# Patient Record
Sex: MALE | Race: BLACK OR AFRICAN AMERICAN | NOT HISPANIC OR LATINO | Employment: UNEMPLOYED | URBAN - METROPOLITAN AREA
[De-identification: names, ages, dates, MRNs, and addresses within clinical notes are randomized per-mention and may not be internally consistent; named-entity substitution may affect disease eponyms.]

---

## 2020-05-20 ENCOUNTER — HOSPITAL ENCOUNTER (EMERGENCY)
Facility: HOSPITAL | Age: 40
Discharge: HOME | End: 2020-05-20
Attending: EMERGENCY MEDICINE | Admitting: EMERGENCY MEDICINE
Payer: COMMERCIAL

## 2020-05-20 VITALS
RESPIRATION RATE: 20 BRPM | HEIGHT: 72 IN | SYSTOLIC BLOOD PRESSURE: 140 MMHG | OXYGEN SATURATION: 98 % | WEIGHT: 239.86 LBS | BODY MASS INDEX: 32.49 KG/M2 | HEART RATE: 85 BPM | TEMPERATURE: 98 F | DIASTOLIC BLOOD PRESSURE: 88 MMHG

## 2020-05-20 DIAGNOSIS — Z76.0 MEDICATION REFILL: Primary | ICD-10-CM

## 2020-05-20 PROCEDURE — 99281 EMR DPT VST MAYX REQ PHY/QHP: CPT | Performed by: PHYSICIAN ASSISTANT

## 2020-05-20 PROCEDURE — 99283 EMERGENCY DEPT VISIT LOW MDM: CPT

## 2020-05-20 RX ORDER — INSULIN ASPART 100 [IU]/ML
INJECTION, SOLUTION INTRAVENOUS; SUBCUTANEOUS
Qty: 1 PEN | Refills: 0 | Status: SHIPPED | OUTPATIENT
Start: 2020-05-20 | End: 2020-05-27 | Stop reason: SDUPTHER

## 2020-05-20 RX ORDER — INSULIN ASPART 100 [IU]/ML
INJECTION, SOLUTION INTRAVENOUS; SUBCUTANEOUS
COMMUNITY
Start: 2020-01-14 | End: 2020-05-20 | Stop reason: SDUPTHER

## 2020-05-26 ENCOUNTER — TELEPHONE (OUTPATIENT)
Dept: INTERNAL MEDICINE CLINIC | Facility: CLINIC | Age: 40
End: 2020-05-26

## 2020-05-26 RX ORDER — BLOOD SUGAR DIAGNOSTIC
STRIP MISCELLANEOUS
COMMUNITY
Start: 2016-03-14 | End: 2020-05-27 | Stop reason: SDUPTHER

## 2020-05-27 ENCOUNTER — OFFICE VISIT (OUTPATIENT)
Dept: INTERNAL MEDICINE CLINIC | Facility: CLINIC | Age: 40
End: 2020-05-27
Payer: COMMERCIAL

## 2020-05-27 ENCOUNTER — TELEPHONE (OUTPATIENT)
Dept: INTERNAL MEDICINE CLINIC | Facility: CLINIC | Age: 40
End: 2020-05-27

## 2020-05-27 VITALS
WEIGHT: 238.6 LBS | SYSTOLIC BLOOD PRESSURE: 122 MMHG | BODY MASS INDEX: 33.4 KG/M2 | HEIGHT: 71 IN | TEMPERATURE: 98.6 F | HEART RATE: 86 BPM | RESPIRATION RATE: 14 BRPM | DIASTOLIC BLOOD PRESSURE: 76 MMHG

## 2020-05-27 DIAGNOSIS — E78.5 HYPERLIPIDEMIA DUE TO TYPE 1 DIABETES MELLITUS (HCC): ICD-10-CM

## 2020-05-27 DIAGNOSIS — R80.9 ALBUMINURIA: ICD-10-CM

## 2020-05-27 DIAGNOSIS — E10.9 TYPE 1 DIABETES MELLITUS WITHOUT COMPLICATION (HCC): ICD-10-CM

## 2020-05-27 DIAGNOSIS — E10.9 TYPE 1 DIABETES MELLITUS WITHOUT COMPLICATION (HCC): Primary | ICD-10-CM

## 2020-05-27 DIAGNOSIS — Z00.00 ANNUAL PHYSICAL EXAM: Primary | ICD-10-CM

## 2020-05-27 DIAGNOSIS — E10.69 HYPERLIPIDEMIA DUE TO TYPE 1 DIABETES MELLITUS (HCC): ICD-10-CM

## 2020-05-27 DIAGNOSIS — Z76.0 MEDICATION REFILL: ICD-10-CM

## 2020-05-27 DIAGNOSIS — Z11.4 SCREENING FOR HIV (HUMAN IMMUNODEFICIENCY VIRUS): ICD-10-CM

## 2020-05-27 PROBLEM — E66.9 OBESITY (BMI 30-39.9): Status: ACTIVE | Noted: 2019-03-12

## 2020-05-27 LAB
LEFT EYE DIABETIC RETINOPATHY: NORMAL
LEFT EYE DIABETIC RETINOPATHY: NORMAL
LEFT EYE IMAGE QUALITY: NORMAL
LEFT EYE IMAGE QUALITY: NORMAL
LEFT EYE MACULAR EDEMA: NORMAL
LEFT EYE MACULAR EDEMA: NORMAL
LEFT EYE OTHER RETINOPATHY: NORMAL
LEFT EYE OTHER RETINOPATHY: NORMAL
RIGHT EYE DIABETIC RETINOPATHY: NORMAL
RIGHT EYE DIABETIC RETINOPATHY: NORMAL
RIGHT EYE IMAGE QUALITY: NORMAL
RIGHT EYE IMAGE QUALITY: NORMAL
RIGHT EYE MACULAR EDEMA: NORMAL
RIGHT EYE MACULAR EDEMA: NORMAL
RIGHT EYE OTHER RETINOPATHY: NORMAL
RIGHT EYE OTHER RETINOPATHY: NORMAL
SEVERITY (EYE EXAM): NORMAL
SEVERITY (EYE EXAM): NORMAL

## 2020-05-27 PROCEDURE — 3008F BODY MASS INDEX DOCD: CPT | Performed by: NURSE PRACTITIONER

## 2020-05-27 PROCEDURE — 3066F NEPHROPATHY DOC TX: CPT | Performed by: NURSE PRACTITIONER

## 2020-05-27 PROCEDURE — 99203 OFFICE O/P NEW LOW 30 MIN: CPT | Performed by: NURSE PRACTITIONER

## 2020-05-27 PROCEDURE — 92250 FUNDUS PHOTOGRAPHY W/I&R: CPT | Performed by: NURSE PRACTITIONER

## 2020-05-27 PROCEDURE — 3066F NEPHROPATHY DOC TX: CPT | Performed by: INTERNAL MEDICINE

## 2020-05-27 PROCEDURE — 2022F DILAT RTA XM EVC RTNOPTHY: CPT | Performed by: NURSE PRACTITIONER

## 2020-05-27 PROCEDURE — 1036F TOBACCO NON-USER: CPT | Performed by: NURSE PRACTITIONER

## 2020-05-27 PROCEDURE — 2025F 7 FLD RTA PHOTO W/O RTNOPTHY: CPT | Performed by: INTERNAL MEDICINE

## 2020-05-27 RX ORDER — INSULIN ASPART 100 [IU]/ML
INJECTION, SOLUTION INTRAVENOUS; SUBCUTANEOUS
Qty: 1 PEN | Refills: 0 | Status: SHIPPED | OUTPATIENT
Start: 2020-05-27 | End: 2020-07-22 | Stop reason: SDUPTHER

## 2020-05-27 RX ORDER — BLOOD SUGAR DIAGNOSTIC
STRIP MISCELLANEOUS 4 TIMES DAILY
Qty: 120 EACH | Refills: 9 | Status: SHIPPED | OUTPATIENT
Start: 2020-05-27 | End: 2020-11-30

## 2020-05-27 RX ORDER — PEN NEEDLE, DIABETIC 31 GX5/16"
NEEDLE, DISPOSABLE MISCELLANEOUS DAILY
Qty: 90 EACH | Refills: 5 | Status: SHIPPED | OUTPATIENT
Start: 2020-05-27 | End: 2020-07-20 | Stop reason: SDUPTHER

## 2020-05-29 ENCOUNTER — TELEPHONE (OUTPATIENT)
Dept: INTERNAL MEDICINE CLINIC | Facility: CLINIC | Age: 40
End: 2020-05-29

## 2020-06-05 ENCOUNTER — TELEPHONE (OUTPATIENT)
Dept: INTERNAL MEDICINE CLINIC | Facility: CLINIC | Age: 40
End: 2020-06-05

## 2020-06-05 DIAGNOSIS — E10.9 TYPE 1 DIABETES MELLITUS WITHOUT COMPLICATION (HCC): Primary | ICD-10-CM

## 2020-06-16 ENCOUNTER — APPOINTMENT (OUTPATIENT)
Dept: LAB | Facility: CLINIC | Age: 40
End: 2020-06-16
Payer: COMMERCIAL

## 2020-06-16 DIAGNOSIS — E10.69 HYPERLIPIDEMIA DUE TO TYPE 1 DIABETES MELLITUS (HCC): ICD-10-CM

## 2020-06-16 DIAGNOSIS — E10.9 TYPE 1 DIABETES MELLITUS WITHOUT COMPLICATION (HCC): ICD-10-CM

## 2020-06-16 DIAGNOSIS — Z11.4 SCREENING FOR HIV (HUMAN IMMUNODEFICIENCY VIRUS): ICD-10-CM

## 2020-06-16 DIAGNOSIS — E78.5 HYPERLIPIDEMIA DUE TO TYPE 1 DIABETES MELLITUS (HCC): ICD-10-CM

## 2020-06-16 LAB
ABO GROUP BLD: NORMAL
ALBUMIN SERPL BCP-MCNC: 3.7 G/DL (ref 3.5–5)
ALP SERPL-CCNC: 71 U/L (ref 46–116)
ALT SERPL W P-5'-P-CCNC: 32 U/L (ref 12–78)
ANION GAP SERPL CALCULATED.3IONS-SCNC: 5 MMOL/L (ref 4–13)
AST SERPL W P-5'-P-CCNC: 17 U/L (ref 5–45)
BASOPHILS # BLD AUTO: 0.02 THOUSANDS/ΜL (ref 0–0.1)
BASOPHILS NFR BLD AUTO: 0 % (ref 0–1)
BILIRUB SERPL-MCNC: 0.66 MG/DL (ref 0.2–1)
BUN SERPL-MCNC: 11 MG/DL (ref 5–25)
CALCIUM SERPL-MCNC: 9.5 MG/DL (ref 8.3–10.1)
CHLORIDE SERPL-SCNC: 107 MMOL/L (ref 100–108)
CHOLEST SERPL-MCNC: 270 MG/DL (ref 50–200)
CO2 SERPL-SCNC: 29 MMOL/L (ref 21–32)
CREAT SERPL-MCNC: 1 MG/DL (ref 0.6–1.3)
CREAT UR-MCNC: 384 MG/DL
EOSINOPHIL # BLD AUTO: 0.16 THOUSAND/ΜL (ref 0–0.61)
EOSINOPHIL NFR BLD AUTO: 3 % (ref 0–6)
ERYTHROCYTE [DISTWIDTH] IN BLOOD BY AUTOMATED COUNT: 14.2 % (ref 11.6–15.1)
EST. AVERAGE GLUCOSE BLD GHB EST-MCNC: 252 MG/DL
GFR SERPL CREATININE-BSD FRML MDRD: 107 ML/MIN/1.73SQ M
GLUCOSE P FAST SERPL-MCNC: 96 MG/DL (ref 65–99)
HBA1C MFR BLD: 10.4 %
HCT VFR BLD AUTO: 48.2 % (ref 36.5–49.3)
HDLC SERPL-MCNC: 50 MG/DL
HGB BLD-MCNC: 16 G/DL (ref 12–17)
IMM GRANULOCYTES # BLD AUTO: 0.01 THOUSAND/UL (ref 0–0.2)
IMM GRANULOCYTES NFR BLD AUTO: 0 % (ref 0–2)
LDLC SERPL CALC-MCNC: 193 MG/DL (ref 0–100)
LYMPHOCYTES # BLD AUTO: 1.85 THOUSANDS/ΜL (ref 0.6–4.47)
LYMPHOCYTES NFR BLD AUTO: 37 % (ref 14–44)
MCH RBC QN AUTO: 28.4 PG (ref 26.8–34.3)
MCHC RBC AUTO-ENTMCNC: 33.2 G/DL (ref 31.4–37.4)
MCV RBC AUTO: 86 FL (ref 82–98)
MICROALBUMIN UR-MCNC: 36.8 MG/L (ref 0–20)
MICROALBUMIN/CREAT 24H UR: 10 MG/G CREATININE (ref 0–30)
MONOCYTES # BLD AUTO: 0.39 THOUSAND/ΜL (ref 0.17–1.22)
MONOCYTES NFR BLD AUTO: 8 % (ref 4–12)
NEUTROPHILS # BLD AUTO: 2.55 THOUSANDS/ΜL (ref 1.85–7.62)
NEUTS SEG NFR BLD AUTO: 52 % (ref 43–75)
NONHDLC SERPL-MCNC: 220 MG/DL
NRBC BLD AUTO-RTO: 0 /100 WBCS
PLATELET # BLD AUTO: 165 THOUSANDS/UL (ref 149–390)
PMV BLD AUTO: 12.2 FL (ref 8.9–12.7)
POTASSIUM SERPL-SCNC: 4.1 MMOL/L (ref 3.5–5.3)
PROT SERPL-MCNC: 7.8 G/DL (ref 6.4–8.2)
RBC # BLD AUTO: 5.63 MILLION/UL (ref 3.88–5.62)
RH BLD: POSITIVE
SODIUM SERPL-SCNC: 141 MMOL/L (ref 136–145)
TRIGL SERPL-MCNC: 137 MG/DL
TSH SERPL DL<=0.05 MIU/L-ACNC: 1.31 UIU/ML (ref 0.36–3.74)
WBC # BLD AUTO: 4.98 THOUSAND/UL (ref 4.31–10.16)

## 2020-06-16 PROCEDURE — 85025 COMPLETE CBC W/AUTO DIFF WBC: CPT

## 2020-06-16 PROCEDURE — 80053 COMPREHEN METABOLIC PANEL: CPT

## 2020-06-16 PROCEDURE — 82043 UR ALBUMIN QUANTITATIVE: CPT | Performed by: NURSE PRACTITIONER

## 2020-06-16 PROCEDURE — 87389 HIV-1 AG W/HIV-1&-2 AB AG IA: CPT

## 2020-06-16 PROCEDURE — 80061 LIPID PANEL: CPT

## 2020-06-16 PROCEDURE — 3061F NEG MICROALBUMINURIA REV: CPT | Performed by: INTERNAL MEDICINE

## 2020-06-16 PROCEDURE — 83036 HEMOGLOBIN GLYCOSYLATED A1C: CPT

## 2020-06-16 PROCEDURE — 86900 BLOOD TYPING SEROLOGIC ABO: CPT

## 2020-06-16 PROCEDURE — 86901 BLOOD TYPING SEROLOGIC RH(D): CPT

## 2020-06-16 PROCEDURE — 3046F HEMOGLOBIN A1C LEVEL >9.0%: CPT | Performed by: INTERNAL MEDICINE

## 2020-06-16 PROCEDURE — 84443 ASSAY THYROID STIM HORMONE: CPT

## 2020-06-16 PROCEDURE — 82570 ASSAY OF URINE CREATININE: CPT | Performed by: NURSE PRACTITIONER

## 2020-06-16 PROCEDURE — 36415 COLL VENOUS BLD VENIPUNCTURE: CPT

## 2020-06-17 LAB — HIV 1+2 AB+HIV1 P24 AG SERPL QL IA: NORMAL

## 2020-06-22 ENCOUNTER — TELEPHONE (OUTPATIENT)
Dept: INTERNAL MEDICINE CLINIC | Facility: CLINIC | Age: 40
End: 2020-06-22

## 2020-06-22 DIAGNOSIS — E78.00 HYPERCHOLESTEREMIA: Primary | ICD-10-CM

## 2020-06-22 RX ORDER — ATORVASTATIN CALCIUM 20 MG/1
20 TABLET, FILM COATED ORAL DAILY
Qty: 90 TABLET | Refills: 0 | Status: SHIPPED | OUTPATIENT
Start: 2020-06-22 | End: 2020-09-25

## 2020-06-26 ENCOUNTER — TELEPHONE (OUTPATIENT)
Dept: INTERNAL MEDICINE CLINIC | Facility: CLINIC | Age: 40
End: 2020-06-26

## 2020-06-30 ENCOUNTER — TELEPHONE (OUTPATIENT)
Dept: INTERNAL MEDICINE CLINIC | Facility: CLINIC | Age: 40
End: 2020-06-30

## 2020-07-20 DIAGNOSIS — E10.9 TYPE 1 DIABETES MELLITUS WITHOUT COMPLICATION (HCC): ICD-10-CM

## 2020-07-21 RX ORDER — PEN NEEDLE, DIABETIC 31 GX5/16"
NEEDLE, DISPOSABLE MISCELLANEOUS DAILY
Qty: 150 EACH | Refills: 1 | Status: SHIPPED | OUTPATIENT
Start: 2020-07-21 | End: 2020-07-22 | Stop reason: SDUPTHER

## 2020-07-22 DIAGNOSIS — Z76.0 MEDICATION REFILL: ICD-10-CM

## 2020-07-22 DIAGNOSIS — E10.9 TYPE 1 DIABETES MELLITUS WITHOUT COMPLICATION (HCC): ICD-10-CM

## 2020-07-22 RX ORDER — PEN NEEDLE, DIABETIC 31 GX5/16"
NEEDLE, DISPOSABLE MISCELLANEOUS DAILY
Qty: 150 EACH | Refills: 1 | Status: SHIPPED | OUTPATIENT
Start: 2020-07-22 | End: 2020-11-30 | Stop reason: SDUPTHER

## 2020-07-22 RX ORDER — INSULIN ASPART 100 [IU]/ML
INJECTION, SOLUTION INTRAVENOUS; SUBCUTANEOUS
Qty: 3 PEN | Refills: 0 | Status: SHIPPED | OUTPATIENT
Start: 2020-07-22 | End: 2020-07-30

## 2020-07-22 NOTE — TELEPHONE ENCOUNTER
REFILL : Insulin Aspart FlexPen (NovoLOG FlexPen) 100 UNIT/ML SOPN      ALSO, Insulin Pen Needle (PEN NEEDLES 31GX5/16") 31G X 8 MM MISC THAT WERE SENT IN NEEDS TO BE FOR 5X A DAY, PATIENT STATES IT WAS SENT TO THE PHARMACY FOR 1X A Armida Greenberg    CALL BACK # [phone]    [facility] # [phone]

## 2020-07-30 DIAGNOSIS — Z76.0 MEDICATION REFILL: ICD-10-CM

## 2020-07-30 DIAGNOSIS — E10.9 TYPE 1 DIABETES MELLITUS WITHOUT COMPLICATION (HCC): ICD-10-CM

## 2020-07-30 RX ORDER — INSULIN ASPART 100 [IU]/ML
INJECTION, SOLUTION INTRAVENOUS; SUBCUTANEOUS
Qty: 9 PEN | Refills: 0 | Status: SHIPPED | OUTPATIENT
Start: 2020-07-30 | End: 2020-08-13

## 2020-08-13 ENCOUNTER — CONSULT (OUTPATIENT)
Dept: ENDOCRINOLOGY | Facility: CLINIC | Age: 40
End: 2020-08-13
Payer: COMMERCIAL

## 2020-08-13 VITALS
HEIGHT: 71 IN | DIASTOLIC BLOOD PRESSURE: 82 MMHG | BODY MASS INDEX: 35.08 KG/M2 | SYSTOLIC BLOOD PRESSURE: 120 MMHG | HEART RATE: 88 BPM | WEIGHT: 250.6 LBS | TEMPERATURE: 98 F

## 2020-08-13 DIAGNOSIS — E10.9 TYPE 1 DIABETES MELLITUS WITHOUT COMPLICATION (HCC): Primary | ICD-10-CM

## 2020-08-13 DIAGNOSIS — E78.2 MIXED HYPERLIPIDEMIA: ICD-10-CM

## 2020-08-13 DIAGNOSIS — E10.9 TYPE 1 DIABETES MELLITUS WITHOUT COMPLICATION (HCC): ICD-10-CM

## 2020-08-13 DIAGNOSIS — R80.9 MICROALBUMINURIA: ICD-10-CM

## 2020-08-13 DIAGNOSIS — E55.9 VITAMIN D DEFICIENCY: ICD-10-CM

## 2020-08-13 DIAGNOSIS — Z76.0 MEDICATION REFILL: ICD-10-CM

## 2020-08-13 DIAGNOSIS — E66.9 OBESITY (BMI 30-39.9): ICD-10-CM

## 2020-08-13 PROCEDURE — 1036F TOBACCO NON-USER: CPT | Performed by: INTERNAL MEDICINE

## 2020-08-13 PROCEDURE — 3060F POS MICROALBUMINURIA REV: CPT | Performed by: INTERNAL MEDICINE

## 2020-08-13 PROCEDURE — 99205 OFFICE O/P NEW HI 60 MIN: CPT | Performed by: INTERNAL MEDICINE

## 2020-08-13 PROCEDURE — 3008F BODY MASS INDEX DOCD: CPT | Performed by: INTERNAL MEDICINE

## 2020-08-13 PROCEDURE — 3046F HEMOGLOBIN A1C LEVEL >9.0%: CPT | Performed by: INTERNAL MEDICINE

## 2020-08-13 PROCEDURE — 3066F NEPHROPATHY DOC TX: CPT | Performed by: INTERNAL MEDICINE

## 2020-08-13 PROCEDURE — 2022F DILAT RTA XM EVC RTNOPTHY: CPT | Performed by: INTERNAL MEDICINE

## 2020-08-13 RX ORDER — BLOOD-GLUCOSE SENSOR
1 EACH MISCELLANEOUS
Qty: 12 EACH | Refills: 0 | Status: SHIPPED | OUTPATIENT
Start: 2020-08-13 | End: 2020-11-30 | Stop reason: SDUPTHER

## 2020-08-13 RX ORDER — BLOOD-GLUCOSE,RECEIVER,CONT
1 EACH MISCELLANEOUS CONTINUOUS
Qty: 1 DEVICE | Refills: 0 | Status: SHIPPED | OUTPATIENT
Start: 2020-08-13 | End: 2020-08-13

## 2020-08-13 RX ORDER — BLOOD-GLUCOSE TRANSMITTER
1 EACH MISCELLANEOUS
Qty: 12 EACH | Refills: 0 | Status: SHIPPED | OUTPATIENT
Start: 2020-08-13 | End: 2020-08-13

## 2020-08-13 RX ORDER — BLOOD-GLUCOSE TRANSMITTER
1 EACH MISCELLANEOUS
Qty: 12 EACH | Refills: 0 | Status: SHIPPED | OUTPATIENT
Start: 2020-08-13 | End: 2020-11-30 | Stop reason: SDUPTHER

## 2020-08-13 RX ORDER — CHLORAL HYDRATE 500 MG
1000 CAPSULE ORAL DAILY
COMMUNITY

## 2020-08-13 RX ORDER — INSULIN ASPART 100 [IU]/ML
INJECTION, SOLUTION INTRAVENOUS; SUBCUTANEOUS
Qty: 9 PEN | Refills: 0 | Status: SHIPPED | OUTPATIENT
Start: 2020-08-13 | End: 2020-09-14

## 2020-08-13 RX ORDER — BLOOD-GLUCOSE SENSOR
1 EACH MISCELLANEOUS
Qty: 12 EACH | Refills: 0 | Status: SHIPPED | OUTPATIENT
Start: 2020-08-13 | End: 2020-08-13

## 2020-08-13 RX ORDER — BLOOD-GLUCOSE,RECEIVER,CONT
1 EACH MISCELLANEOUS CONTINUOUS
Qty: 1 DEVICE | Refills: 0 | Status: SHIPPED | OUTPATIENT
Start: 2020-08-13 | End: 2020-08-17

## 2020-08-13 NOTE — PROGRESS NOTES
New consult Patient Progress Note      CC: Type 1 diabetes    History of Present Illness:   53-year-old male with history of type 1 diabetes since age 3 associated with peripheral neuropathy, micro albuminuria and possible retinopathy but no history of heart attacks strokes or intermittent claudication  He was under follow-up by [facility] endocrinology with last appointment in March 2019  He was previously on a Medtronic insulin pump with a sensor but was unable to tolerate it  Due to insurance coverage, he is switching to [facility] system  He generally reports poor control with most recent A1c 10 4% which is increased from 9 4% last year  He reports noncompliance with his carb counting, regular activity and general diabetes care  He reports recent weight gain of approximately 20 lb in the last 1 year  He denies any polyuria, polydipsia or persistent blurred vision  He has a strong family history with both mother and father having type 2 diabetes  Diet: does not carb count presently; lots of outside food  Eats 3-4meals per day but carb rich diet  Home blood glucose monitoring: checks 1-2/day  Before breakfast: 100-120mg/dL  Before lunch:   Before dinner:   Bedtime: 150-350mg/dL  Hypoglycemia:    Current meds:  Levemir 60u daily bedtime  Novolog 15-20 units/meal    Opthamology: 5/27/20 - none  Podiatry: No  Influenza: No  Dental:No  Pancreatitis: No      Ace/ARB:No  Statin: lipitor  Thyroid issues: No    Patient Active Problem List   Diagnosis    Albuminuria    Diabetes mellitus type 1 ([facility] )    Hyperlipidemia due to type 1 diabetes mellitus (HCC)    Obesity (BMI 30-39  9)     Past Medical History:   Diagnosis Date    Diabetes mellitus type 1 ([facility] )       History reviewed  No pertinent surgical history     Family History   Problem Relation Age of Onset    Diabetes Mother     Diabetes Father      Social History     Tobacco Use    Smoking status: Never Smoker    Smokeless tobacco: Never Used   Substance Use Topics    Alcohol use: Yes     Frequency: Monthly or less     Drinks per session: 1 or 2     Binge frequency: Never     No Known Allergies      Review of Systems   Constitutional: Positive for activity change, appetite change and fatigue  HENT: Negative  Eyes: Negative  Respiratory: Negative  Cardiovascular: Negative for chest pain  Gastrointestinal: Negative  Endocrine: Negative  Genitourinary: Negative  Musculoskeletal: Negative  Skin: Negative  Allergic/Immunologic: Negative  Neurological: Negative  Hematological: Negative  Psychiatric/Behavioral: Negative  All other systems reviewed and are negative  Physical Exam:  Body mass index is 34 95 kg/m²  /82   Pulse 88   Temp 98 °F (36 7 °C)   Ht 5' 11" (1 803 m)   Wt 114 kg (250 lb 9 6 oz)   BMI 34 95 kg/m²    Vitals:    08/13/20 0751   Weight: 114 kg (250 lb 9 6 oz)        Physical Exam  Constitutional:       Appearance: He is well-developed  HENT:      Head: Normocephalic  Eyes:      Pupils: Pupils are equal, round, and reactive to light  Neck:      Musculoskeletal: Normal range of motion  Thyroid: No thyromegaly  Cardiovascular:      Rate and Rhythm: Normal rate  Heart sounds: Normal heart sounds  Pulmonary:      Effort: Pulmonary effort is normal       Breath sounds: Normal breath sounds  Abdominal:      General: Bowel sounds are normal       Palpations: Abdomen is soft  Musculoskeletal:         General: No deformity  Skin:     Capillary Refill: Capillary refill takes less than 2 seconds  Coloration: Skin is not pale  Findings: No rash  Neurological:      Mental Status: He is alert and oriented to person, place, and time           Labs:   Lab Results   Component Value Date    HGBA1C 10 4 (H) 06/16/2020       Lab Results   Component Value Date    FMY3BZRHPEAE 1 310 06/16/2020       Lab Results   Component Value Date    CREATININE 1 00 06/16/2020 BUN 11 06/16/2020    K 4 1 06/16/2020     06/16/2020    CO2 29 06/16/2020     eGFR   Date Value Ref Range Status   06/16/2020 107 ml/min/1 73sq m Final       Lab Results   Component Value Date    ALT 32 06/16/2020    AST 17 06/16/2020    ALKPHOS 71 06/16/2020       Lab Results   Component Value Date    CHOLESTEROL 270 (H) 06/16/2020     Lab Results   Component Value Date    HDL 50 06/16/2020     Lab Results   Component Value Date    TRIG 137 06/16/2020     Lab Results   Component Value Date    [place] 220 06/16/2020         Impression:  1  Type 1 diabetes mellitus without complication ([facility] )    2  Microalbuminuria    3  Obesity (BMI 30-39 9)    4  Vitamin D deficiency         Plan:    Diagnoses and all orders for this visit:    Type 1 diabetes mellitus without complication ([facility] )  He is uncontrolled with an A1c of 10 4%  Goal is less than 7%  Patient currently seems to be motivated to get his diabetes back on track  Today we discussed diet, carb counting, lifestyle changes including 30 minutes of brisk activity every day, glucose sensor and addition of oral agents to reduce the total requirement of insulin  Patient is agreeable to the plan of care  Once patient is able to start using semaglutide, will aim to reduce his total daily dose of insulin  For now, will increase insulin to carbohydrate ratio to 1 unit for 5 g of carbohydrates  Continue Levemir 60 units at bedtime and ISF 1 unit / 25 mg/dL over 150 mg/dL  Advised to send a 2 week log of blood glucose    Will follow-up in 2 months time with repeat A1c   -     Ambulatory referral to Endocrinology  -     Continuous Blood Gluc  (Dexcom G6 ) [address]; 1 Units by Does not apply route continuous  -     Continuous Blood Gluc Sensor (Dexcom G6 Sensor) MISC; 1 Units by Does not apply route every 14 (fourteen) days  -     Continuous Blood Gluc Transmit (Dexcom G6 Transmitter) MISC; 1 Units by Does not apply route every 14 (fourteen) days  - Semaglutide 3 MG TABS; Take 3 mg by mouth daily  -     Hemoglobin A1C; Future  -     Microalbumin / creatinine urine ratio; Future  -     Insulin Aspart FlexPen 100 UNIT/ML SOPN; Inject 1 unit for 5 grams of carbs 1 unit for 25 mg/dl lowering to target glucose of 150  Max dose of 60 units per day  -     Ambulatory referral to Podiatry; Future  -     Ambulatory referral to Diabetic Education; Future    Microalbuminuria  Discussed requirement for ACE-inhibitor/Arb for renal protection  May repeat    Obesity (BMI 30-39 9)  Advised carb counting  Diabetes education  Regular activity  -     Ambulatory referral to Sleep Medicine; Future    Vitamin D deficiency  Advised to take over-the-counter vitamin-D 3 supplementation   -     Vitamin D 25 hydroxy; Future    Medication refill  -     Insulin Aspart FlexPen 100 UNIT/ML SOPN; Inject 1 unit for 5 grams of carbs 1 unit for 25 mg/dl lowering to target glucose of 150  Max dose of 60 units per day  Mixed hyperlipidemia  He has elevated total cholesterol and LDL  Primary Care Physician is monitoring  I have spent 45 minutes with patient today in which greater than 50% of this time was spent in counseling/coordination of care  Discussed with the patient and all questioned fully answered  He will call me if any problems arise  Educated/ Counseled patient on diagnostic test results, prognosis, risk vs benefit of treatment options, importance of treatment compliance, healthy life and lifestyle choices        [address]

## 2020-08-17 DIAGNOSIS — E10.9 TYPE 1 DIABETES MELLITUS WITHOUT COMPLICATION (HCC): ICD-10-CM

## 2020-08-17 RX ORDER — BLOOD-GLUCOSE,RECEIVER,CONT
1 EACH MISCELLANEOUS CONTINUOUS
Qty: 1 DEVICE | Refills: 0 | Status: SHIPPED | OUTPATIENT
Start: 2020-08-17 | End: 2020-08-19

## 2020-08-18 ENCOUNTER — TELEMEDICINE (OUTPATIENT)
Dept: DIABETES SERVICES | Facility: CLINIC | Age: 40
End: 2020-08-18
Payer: COMMERCIAL

## 2020-08-18 DIAGNOSIS — E10.9 TYPE 1 DIABETES MELLITUS WITHOUT COMPLICATION (HCC): Primary | ICD-10-CM

## 2020-08-18 PROCEDURE — G0108 DIAB MANAGE TRN  PER INDIV: HCPCS | Performed by: DIETITIAN, REGISTERED

## 2020-08-18 NOTE — PATIENT INSTRUCTIONS
1  Complete 3 day food and insulin record and send back to me via email: [email or web address]  2  Review all educational material sent  3  Check blood sugar levels before each meal and determine insulin dose needed at meal based on blood sugar reading and carbohydrate intake anticipated  4   Expect a phone call to schedule your next appointment some time after you remit the 3 day food diary

## 2020-08-18 NOTE — PROGRESS NOTES
Virtual Regular Visit      Assessment/Plan:    Problem List Items Addressed This Visit        Endocrine    Diabetes mellitus type 1 ([facility] ) - Primary               Reason for visit is   Chief Complaint   Patient presents with    Diabetes Type 1    Virtual Regular Visit        Encounter provider Samantha Cardenas    Provider located at [address] [facility],[address]  [facility] [id number] [place] [id number]      Recent Visits  No visits were found meeting these conditions  Showing recent visits within past 7 days and meeting all other requirements     Today's Visits  Date Type Provider Dept   08/18/20 Telemedicine Samantha Cardenas [facility] Diabetic Education [address] M[facility]Files today's visits and meeting all other requirements     Future Appointments  No visits were found meeting these conditions  Showing future appointments within next 150 days and meeting all other requirements        The patient was identified by name and date of birth  Mahesh Saeed was informed that this is a telemedicine visit and that the visit is being conducted through [employer]  My office door was closed  No one else was in the room  He acknowledged consent and understanding of privacy and security of the video platform  The patient has agreed to participate and understands they can discontinue the visit at any time  Patient is aware this is a billable service  Anca Saeed is a 41 y o  male , please see documentation below   HPI     Past Medical History:   Diagnosis Date    Diabetes mellitus type 1 ([facility] )        No past surgical history on file  Current Outpatient Medications   Medication Sig Dispense Refill    glucose blood test strip 1 each by Other route 4 (four) times a day 120 each 9    Insulin Aspart FlexPen 100 UNIT/ML SOPN Inject 1 unit for 5 grams of carbs 1 unit for 25 mg/dl lowering to target glucose of 150  Max dose of 60 units per day   9 pen 0    insulin detemir (Levemir FlexTouch) 100 Units/mL injection pen Inject 60 Units under the skin daily at bedtime 5 pen 2    Insulin Pen Needle (PEN NEEDLES 31GX5/16") 31G X 8 MM MISC by Does not apply route daily inject 5x per day 150 each 1    atorvastatin (LIPITOR) 20 mg tablet Take 1 tablet (20 mg total) by mouth daily 90 tablet 0    Continuous Blood Gluc  (Dexcom G6 ) POLA 1 UNITS BY DOES NOT APPLY ROUTE CONTINUOUS (Patient not taking: Reported on 8/18/2020) 1 Device 0    Continuous Blood Gluc Sensor (Dexcom G6 Sensor) MISC 1 UNITS BY DOES NOT APPLY ROUTE EVERY 14 (FOURTEEN) DAYS (Patient not taking: Reported on 8/18/2020) 12 each 0    Continuous Blood Gluc Transmit (Dexcom G6 Transmitter) MISC 1 UNITS BY DOES NOT APPLY ROUTE EVERY 14 (FOURTEEN) DAYS (Patient not taking: Reported on 8/18/2020) 12 each 0    Insulin Syringe-Needle U-100 31G X 5/16" 1 ML MISC by Subdermal route 4 (four) times a day (Patient not taking: Reported on 8/18/2020) 120 each 9    Multiple Vitamins-Minerals (MULTIVITAMIN ADULT PO) Take by mouth      Omega-3 Fatty Acids (fish oil) 1,000 mg Take 1,000 mg by mouth daily      Semaglutide 3 MG TABS Take 3 mg by mouth daily 30 tablet 0     No current facility-administered medications for this visit  No Known Allergies    Review of Systems    Video Exam    There were no vitals filed for this visit  Physical Exam     I spent 60 minutes directly with the patient during this visit      Haristeve Caldera acknowledges that he has consented to an online visit or consultation  He understands that the online visit is based solely on information provided by him, and that, in the absence of a face-to-face physical evaluation by the physician, the diagnosis he receives is both limited and provisional in terms of accuracy and completeness  This is not intended to replace a full medical face-to-face evaluation by the physician   Krystina Mendosa understands and accepts these terms  Carbohydrate Counting Instruction    Met with Otto for carbohydrate counting  Themony Myers is currently on the following insulin regimen: Levemir 60 units daily, Novolog 1:5 ICR, 1:25 ISF, goal of 150    Present at Session: patient     Patient Instructed on: Carbohydrate counting  Used [employer], Food labels, measuring cups, and other props to teach label reading, carbohydrate counting, monitoring portion control and food diary  Due to virtual nature of today's visit, was unable to have patient complete one day food diary exercise during session  Also provided education on treating hypoglycemia, ketone guidelines, and managing sick days  At this time, it is difficult to know whether Garrick Myers can demonstrate the math skills necessary for successful carbohydrate counting and following a flexible insulin regimen  Diabetes Education Record  Garrick Myers will receive the following handouts via email: Portion Book, 3 day food logs, 15/15 rule, Ketone Guidelines, Sick Day rules      Patient response to instruction    Comprehensionvery good  Motivationvery good  Expected Compliancevery good    When food logs returned and reviewed, if appropriate, will determine flexible insulin regimen needs adjustment or if further education is necessary  Will then have  call Otto to schedule next visit  Patient also expressed an interest in a nutrition consultation, will have him schedule this after completing carb counting  Thank you for referring your patient to [facility], it was a pleasure working with them today  Please feel free to call with any questions or concerns      Manny Downs  [id number]  [address] [facility] [id number]

## 2020-08-19 RX ORDER — BLOOD-GLUCOSE,RECEIVER,CONT
EACH MISCELLANEOUS
Qty: 1 DEVICE | Refills: 0 | Status: SHIPPED | OUTPATIENT
Start: 2020-08-19 | End: 2020-11-30 | Stop reason: SDUPTHER

## 2020-08-22 DIAGNOSIS — E10.9 TYPE 1 DIABETES MELLITUS WITHOUT COMPLICATION (HCC): ICD-10-CM

## 2020-08-23 DIAGNOSIS — E10.9 TYPE 1 DIABETES MELLITUS WITHOUT COMPLICATION (HCC): ICD-10-CM

## 2020-08-23 DIAGNOSIS — Z76.0 MEDICATION REFILL: ICD-10-CM

## 2020-08-24 RX ORDER — INSULIN ASPART 100 [IU]/ML
INJECTION, SOLUTION INTRAVENOUS; SUBCUTANEOUS
Refills: 0 | OUTPATIENT
Start: 2020-08-24

## 2020-08-24 RX ORDER — INSULIN DETEMIR 100 [IU]/ML
INJECTION, SOLUTION SUBCUTANEOUS
Qty: 5 PEN | Refills: 2 | Status: SHIPPED | OUTPATIENT
Start: 2020-08-24 | End: 2020-11-23

## 2020-09-13 DIAGNOSIS — E10.9 TYPE 1 DIABETES MELLITUS WITHOUT COMPLICATION (HCC): ICD-10-CM

## 2020-09-13 DIAGNOSIS — Z76.0 MEDICATION REFILL: ICD-10-CM

## 2020-09-14 RX ORDER — INSULIN ASPART 100 [IU]/ML
INJECTION, SOLUTION INTRAVENOUS; SUBCUTANEOUS
Qty: 30 PEN | Refills: 0 | Status: SHIPPED | OUTPATIENT
Start: 2020-09-14 | End: 2020-10-12

## 2020-09-25 DIAGNOSIS — E78.00 HYPERCHOLESTEREMIA: ICD-10-CM

## 2020-09-25 RX ORDER — ATORVASTATIN CALCIUM 20 MG/1
TABLET, FILM COATED ORAL
Qty: 90 TABLET | Refills: 0 | Status: SHIPPED | OUTPATIENT
Start: 2020-09-25 | End: 2020-12-21

## 2020-10-11 DIAGNOSIS — E10.9 TYPE 1 DIABETES MELLITUS WITHOUT COMPLICATION (HCC): ICD-10-CM

## 2020-10-11 DIAGNOSIS — Z76.0 MEDICATION REFILL: ICD-10-CM

## 2020-10-12 RX ORDER — INSULIN ASPART 100 [IU]/ML
INJECTION, SOLUTION INTRAVENOUS; SUBCUTANEOUS
Qty: 5 PEN | Refills: 1 | Status: SHIPPED | OUTPATIENT
Start: 2020-10-12 | End: 2020-11-06

## 2020-11-04 DIAGNOSIS — Z76.0 MEDICATION REFILL: ICD-10-CM

## 2020-11-04 DIAGNOSIS — E10.9 TYPE 1 DIABETES MELLITUS WITHOUT COMPLICATION (HCC): ICD-10-CM

## 2020-11-06 DIAGNOSIS — E10.9 TYPE 1 DIABETES MELLITUS WITHOUT COMPLICATION (HCC): ICD-10-CM

## 2020-11-06 DIAGNOSIS — Z76.0 MEDICATION REFILL: ICD-10-CM

## 2020-11-06 RX ORDER — INSULIN ASPART 100 [IU]/ML
INJECTION, SOLUTION INTRAVENOUS; SUBCUTANEOUS
Qty: 5 PEN | Refills: 3 | Status: SHIPPED | OUTPATIENT
Start: 2020-11-06 | End: 2020-11-10

## 2020-11-10 DIAGNOSIS — E10.9 TYPE 1 DIABETES MELLITUS WITHOUT COMPLICATION (HCC): ICD-10-CM

## 2020-11-10 DIAGNOSIS — Z76.0 MEDICATION REFILL: ICD-10-CM

## 2020-11-10 RX ORDER — INSULIN ASPART 100 [IU]/ML
INJECTION, SOLUTION INTRAVENOUS; SUBCUTANEOUS
Qty: 5 PEN | Refills: 3 | Status: SHIPPED | OUTPATIENT
Start: 2020-11-10 | End: 2020-11-10

## 2020-11-10 RX ORDER — INSULIN ASPART 100 [IU]/ML
INJECTION, SOLUTION INTRAVENOUS; SUBCUTANEOUS
Qty: 15 ML | Refills: 3 | Status: SHIPPED | OUTPATIENT
Start: 2020-11-10 | End: 2021-03-11 | Stop reason: SDUPTHER

## 2020-11-22 DIAGNOSIS — E10.9 TYPE 1 DIABETES MELLITUS WITHOUT COMPLICATION (HCC): ICD-10-CM

## 2020-11-23 RX ORDER — INSULIN DETEMIR 100 [IU]/ML
INJECTION, SOLUTION SUBCUTANEOUS
Qty: 5 PEN | Refills: 2 | Status: SHIPPED | OUTPATIENT
Start: 2020-11-23 | End: 2021-02-12

## 2020-11-30 ENCOUNTER — TELEPHONE (OUTPATIENT)
Dept: INTERNAL MEDICINE CLINIC | Facility: CLINIC | Age: 40
End: 2020-11-30

## 2020-11-30 ENCOUNTER — OFFICE VISIT (OUTPATIENT)
Dept: INTERNAL MEDICINE CLINIC | Facility: CLINIC | Age: 40
End: 2020-11-30
Payer: COMMERCIAL

## 2020-11-30 ENCOUNTER — APPOINTMENT (OUTPATIENT)
Dept: LAB | Facility: CLINIC | Age: 40
End: 2020-11-30
Payer: COMMERCIAL

## 2020-11-30 VITALS
HEART RATE: 74 BPM | TEMPERATURE: 95 F | WEIGHT: 258 LBS | HEIGHT: 71 IN | BODY MASS INDEX: 36.12 KG/M2 | DIASTOLIC BLOOD PRESSURE: 80 MMHG | SYSTOLIC BLOOD PRESSURE: 124 MMHG | RESPIRATION RATE: 16 BRPM

## 2020-11-30 DIAGNOSIS — E78.2 MIXED HYPERLIPIDEMIA: ICD-10-CM

## 2020-11-30 DIAGNOSIS — E55.9 VITAMIN D DEFICIENCY: ICD-10-CM

## 2020-11-30 DIAGNOSIS — E66.9 OBESITY (BMI 30-39.9): ICD-10-CM

## 2020-11-30 DIAGNOSIS — E10.9 TYPE 1 DIABETES MELLITUS WITHOUT COMPLICATION (HCC): ICD-10-CM

## 2020-11-30 DIAGNOSIS — Z20.822 CLOSE EXPOSURE TO 2019 NOVEL CORONAVIRUS: ICD-10-CM

## 2020-11-30 DIAGNOSIS — E10.9 TYPE 1 DIABETES MELLITUS WITHOUT COMPLICATION (HCC): Primary | ICD-10-CM

## 2020-11-30 LAB
25(OH)D3 SERPL-MCNC: 16.5 NG/ML (ref 30–100)
CHOLEST SERPL-MCNC: 252 MG/DL (ref 50–200)
CREAT UR-MCNC: 180 MG/DL
EST. AVERAGE GLUCOSE BLD GHB EST-MCNC: 237 MG/DL
HBA1C MFR BLD: 9.9 %
HDLC SERPL-MCNC: 50 MG/DL
LDLC SERPL CALC-MCNC: 147 MG/DL (ref 0–100)
MICROALBUMIN UR-MCNC: 32.3 MG/L (ref 0–20)
MICROALBUMIN/CREAT 24H UR: 18 MG/G CREATININE (ref 0–30)
NONHDLC SERPL-MCNC: 202 MG/DL
SARS-COV-2 IGG+IGM SERPL QL IA: REACTIVE
TRIGL SERPL-MCNC: 277 MG/DL

## 2020-11-30 PROCEDURE — 1036F TOBACCO NON-USER: CPT | Performed by: NURSE PRACTITIONER

## 2020-11-30 PROCEDURE — 82570 ASSAY OF URINE CREATININE: CPT

## 2020-11-30 PROCEDURE — 3725F SCREEN DEPRESSION PERFORMED: CPT | Performed by: NURSE PRACTITIONER

## 2020-11-30 PROCEDURE — 86769 SARS-COV-2 COVID-19 ANTIBODY: CPT

## 2020-11-30 PROCEDURE — 82306 VITAMIN D 25 HYDROXY: CPT

## 2020-11-30 PROCEDURE — 80061 LIPID PANEL: CPT

## 2020-11-30 PROCEDURE — 99214 OFFICE O/P EST MOD 30 MIN: CPT | Performed by: NURSE PRACTITIONER

## 2020-11-30 PROCEDURE — 82043 UR ALBUMIN QUANTITATIVE: CPT

## 2020-11-30 PROCEDURE — 3046F HEMOGLOBIN A1C LEVEL >9.0%: CPT | Performed by: NURSE PRACTITIONER

## 2020-11-30 PROCEDURE — 36415 COLL VENOUS BLD VENIPUNCTURE: CPT

## 2020-11-30 PROCEDURE — 83036 HEMOGLOBIN GLYCOSYLATED A1C: CPT

## 2020-11-30 PROCEDURE — 3061F NEG MICROALBUMINURIA REV: CPT | Performed by: NURSE PRACTITIONER

## 2020-11-30 PROCEDURE — 3008F BODY MASS INDEX DOCD: CPT | Performed by: NURSE PRACTITIONER

## 2020-11-30 RX ORDER — BLOOD-GLUCOSE TRANSMITTER
1 EACH MISCELLANEOUS
Qty: 12 EACH | Refills: 0 | Status: SHIPPED | OUTPATIENT
Start: 2020-11-30 | End: 2021-03-29

## 2020-11-30 RX ORDER — BLOOD-GLUCOSE SENSOR
1 EACH MISCELLANEOUS
Qty: 12 EACH | Refills: 0 | Status: SHIPPED | OUTPATIENT
Start: 2020-11-30 | End: 2021-01-11

## 2020-11-30 RX ORDER — BLOOD-GLUCOSE,RECEIVER,CONT
EACH MISCELLANEOUS
Qty: 1 DEVICE | Refills: 0 | Status: SHIPPED | OUTPATIENT
Start: 2020-11-30 | End: 2021-01-11

## 2020-11-30 RX ORDER — PEN NEEDLE, DIABETIC 31 GX5/16"
NEEDLE, DISPOSABLE MISCELLANEOUS DAILY
Qty: 150 EACH | Refills: 1 | Status: SHIPPED | OUTPATIENT
Start: 2020-11-30 | End: 2021-04-06

## 2020-12-01 ENCOUNTER — TELEPHONE (OUTPATIENT)
Dept: INTERNAL MEDICINE CLINIC | Facility: CLINIC | Age: 40
End: 2020-12-01

## 2020-12-01 ENCOUNTER — TELEPHONE (OUTPATIENT)
Dept: ENDOCRINOLOGY | Facility: CLINIC | Age: 40
End: 2020-12-01

## 2020-12-01 LAB — SARS-COV-2 IGG SERPL QL IA: REACTIVE

## 2020-12-03 ENCOUNTER — TELEMEDICINE (OUTPATIENT)
Dept: ENDOCRINOLOGY | Facility: CLINIC | Age: 40
End: 2020-12-03
Payer: COMMERCIAL

## 2020-12-03 ENCOUNTER — TELEPHONE (OUTPATIENT)
Dept: ENDOCRINOLOGY | Facility: CLINIC | Age: 40
End: 2020-12-03

## 2020-12-03 DIAGNOSIS — E55.9 VITAMIN D DEFICIENCY: ICD-10-CM

## 2020-12-03 DIAGNOSIS — E10.65 TYPE 1 DIABETES MELLITUS WITH HYPERGLYCEMIA (HCC): Primary | ICD-10-CM

## 2020-12-03 DIAGNOSIS — E78.2 MIXED HYPERLIPIDEMIA: ICD-10-CM

## 2020-12-03 DIAGNOSIS — E66.9 OBESITY (BMI 30-39.9): ICD-10-CM

## 2020-12-03 PROCEDURE — 99214 OFFICE O/P EST MOD 30 MIN: CPT | Performed by: INTERNAL MEDICINE

## 2020-12-03 RX ORDER — ERGOCALCIFEROL 1.25 MG/1
50000 CAPSULE ORAL 3 TIMES WEEKLY
Qty: 20 CAPSULE | Refills: 0 | Status: SHIPPED | OUTPATIENT
Start: 2020-12-04 | End: 2021-11-17 | Stop reason: ALTCHOICE

## 2020-12-19 DIAGNOSIS — E78.00 HYPERCHOLESTEREMIA: ICD-10-CM

## 2020-12-21 RX ORDER — ATORVASTATIN CALCIUM 20 MG/1
TABLET, FILM COATED ORAL
Qty: 90 TABLET | Refills: 0 | Status: SHIPPED | OUTPATIENT
Start: 2020-12-21 | End: 2022-03-31

## 2021-01-11 ENCOUNTER — TELEPHONE (OUTPATIENT)
Dept: INTERNAL MEDICINE CLINIC | Facility: CLINIC | Age: 41
End: 2021-01-11

## 2021-01-11 DIAGNOSIS — E10.65 TYPE 1 DIABETES MELLITUS WITH HYPERGLYCEMIA (HCC): Primary | ICD-10-CM

## 2021-01-11 RX ORDER — FLASH GLUCOSE SENSOR
1 KIT MISCELLANEOUS
Qty: 4 EACH | Refills: 2 | Status: SHIPPED | OUTPATIENT
Start: 2021-01-11 | End: 2021-02-02

## 2021-01-11 RX ORDER — FLASH GLUCOSE SCANNING READER
1 EACH MISCELLANEOUS 4 TIMES DAILY PRN
Qty: 1 DEVICE | Refills: 0 | Status: SHIPPED | OUTPATIENT
Start: 2021-01-11 | End: 2021-02-02

## 2021-01-11 NOTE — TELEPHONE ENCOUNTER
[facility] denied the continuous blood glucose monitoring  If physician would like to file an appeal or peer to peer within 72 hours    [phone] p to p  Case #[id number]     [facility] will fax over the denial

## 2021-01-11 NOTE — TELEPHONE ENCOUNTER
Spoke to representative from [employer] , they will be calling tomorrow for the peer to peer review, please grab me when they call  Thanks!

## 2021-01-11 NOTE — TELEPHONE ENCOUNTER
[facility]'s [facility] for the dexcom glucose monitor  They will only cover the freestyle milly  If pt specifically requested the dexcom monitor, a prior authorization would be needed       [phone]

## 2021-01-13 ENCOUNTER — TELEPHONE (OUTPATIENT)
Dept: INTERNAL MEDICINE CLINIC | Facility: CLINIC | Age: 41
End: 2021-01-13

## 2021-01-13 NOTE — TELEPHONE ENCOUNTER
----- Message from Contreras Blanchard sent at 1/12/2021  3:26 PM EST -----  I just spoke to Dr Morgan Dodson ([phone]) about CGM for the above patient  Can we please send the new orders for the freestyle milly device and sensors to his insurance  They will not cover the dexcom, but will cover [facility]  If we have any concerns she said we can give her a call back specifically at the above number thank you!

## 2021-01-18 ENCOUNTER — TELEPHONE (OUTPATIENT)
Dept: ENDOCRINOLOGY | Facility: CLINIC | Age: 41
End: 2021-01-18

## 2021-01-18 NOTE — TELEPHONE ENCOUNTER
Spoke to pt and his insurance wont cover a dexcom but will cover a Cruz  which his family dr put in for him, he also said insurance said the Rybelsus wasn't medically necessary I asked if there was a alternative and he told me it was the one on TV, I asked him to call his insurance and get the name of one that would be covered

## 2021-01-18 NOTE — TELEPHONE ENCOUNTER
Standard written order and last office visit note were fax to [address] [place] [address]  Today, Pt is requesting FreeStyle Demond 14

## 2021-01-19 ENCOUNTER — DOCUMENTATION (OUTPATIENT)
Dept: ENDOCRINOLOGY | Facility: CLINIC | Age: 41
End: 2021-01-19

## 2021-01-19 ENCOUNTER — TELEPHONE (OUTPATIENT)
Dept: ENDOCRINOLOGY | Facility: CLINIC | Age: 41
End: 2021-01-19

## 2021-01-19 DIAGNOSIS — E11.65 TYPE 2 DIABETES MELLITUS WITH HYPERGLYCEMIA, WITH LONG-TERM CURRENT USE OF INSULIN (HCC): Primary | ICD-10-CM

## 2021-01-19 DIAGNOSIS — Z79.4 TYPE 2 DIABETES MELLITUS WITH HYPERGLYCEMIA, WITH LONG-TERM CURRENT USE OF INSULIN (HCC): Primary | ICD-10-CM

## 2021-01-19 NOTE — TELEPHONE ENCOUNTER
Otto called his insurance company  They said they would cover Tr[employer] if that is an option  Thank you!

## 2021-01-19 NOTE — TELEPHONE ENCOUNTER
[facility] [phone]  Submitted via CoverMyMeds

## 2021-01-20 NOTE — TELEPHONE ENCOUNTER
[facility] Nakia DECKER approved, Eff 1/20/2021 - 1/20/2022, Quantity # 2 per 34 days    Called and notified [facility] of approval     Also called and notified patient

## 2021-01-28 DIAGNOSIS — E10.65 TYPE 1 DIABETES MELLITUS WITH HYPERGLYCEMIA (HCC): ICD-10-CM

## 2021-02-02 RX ORDER — FLASH GLUCOSE SENSOR
1 KIT MISCELLANEOUS
Qty: 4 EACH | Refills: 2 | Status: SHIPPED | OUTPATIENT
Start: 2021-02-02 | End: 2021-02-12 | Stop reason: SDUPTHER

## 2021-02-02 RX ORDER — FLASH GLUCOSE SCANNING READER
1 EACH MISCELLANEOUS 4 TIMES DAILY PRN
Qty: 1 DEVICE | Refills: 0 | Status: SHIPPED | OUTPATIENT
Start: 2021-02-02 | End: 2021-02-12 | Stop reason: SDUPTHER

## 2021-02-12 DIAGNOSIS — E10.9 TYPE 1 DIABETES MELLITUS WITHOUT COMPLICATION (HCC): ICD-10-CM

## 2021-02-12 DIAGNOSIS — E10.65 TYPE 1 DIABETES MELLITUS WITH HYPERGLYCEMIA (HCC): ICD-10-CM

## 2021-02-12 RX ORDER — FLASH GLUCOSE SCANNING READER
1 EACH MISCELLANEOUS 4 TIMES DAILY PRN
Qty: 1 DEVICE | Refills: 0 | Status: SHIPPED | OUTPATIENT
Start: 2021-02-12 | End: 2021-03-29 | Stop reason: SDUPTHER

## 2021-02-12 RX ORDER — FLASH GLUCOSE SENSOR
1 KIT MISCELLANEOUS
Qty: 4 EACH | Refills: 2 | Status: SHIPPED | OUTPATIENT
Start: 2021-02-12 | End: 2021-03-29 | Stop reason: SDUPTHER

## 2021-02-12 RX ORDER — INSULIN DETEMIR 100 [IU]/ML
INJECTION, SOLUTION SUBCUTANEOUS
Qty: 15 PEN | Refills: 2 | Status: SHIPPED | OUTPATIENT
Start: 2021-02-12 | End: 2021-03-29 | Stop reason: SDUPTHER

## 2021-02-12 NOTE — TELEPHONE ENCOUNTER
The patient's giselle Nobles Number has to go to a DME supply store as per his insurance, pended medication to you set to print   So we can fax it over to shanelle

## 2021-03-11 DIAGNOSIS — E10.9 TYPE 1 DIABETES MELLITUS WITHOUT COMPLICATION (HCC): ICD-10-CM

## 2021-03-11 DIAGNOSIS — Z76.0 MEDICATION REFILL: ICD-10-CM

## 2021-03-11 RX ORDER — INSULIN ASPART 100 [IU]/ML
INJECTION, SOLUTION INTRAVENOUS; SUBCUTANEOUS
Qty: 15 ML | Refills: 3 | Status: SHIPPED | OUTPATIENT
Start: 2021-03-11 | End: 2021-06-28 | Stop reason: SDUPTHER

## 2021-03-29 ENCOUNTER — OFFICE VISIT (OUTPATIENT)
Dept: INTERNAL MEDICINE CLINIC | Facility: CLINIC | Age: 41
End: 2021-03-29
Payer: COMMERCIAL

## 2021-03-29 ENCOUNTER — LAB (OUTPATIENT)
Dept: LAB | Facility: CLINIC | Age: 41
End: 2021-03-29
Payer: COMMERCIAL

## 2021-03-29 VITALS
WEIGHT: 263 LBS | SYSTOLIC BLOOD PRESSURE: 138 MMHG | DIASTOLIC BLOOD PRESSURE: 90 MMHG | HEIGHT: 71 IN | OXYGEN SATURATION: 98 % | BODY MASS INDEX: 36.82 KG/M2 | HEART RATE: 98 BPM | TEMPERATURE: 97.9 F

## 2021-03-29 DIAGNOSIS — E55.9 VITAMIN D DEFICIENCY: ICD-10-CM

## 2021-03-29 DIAGNOSIS — Z13.6 SCREENING FOR CARDIOVASCULAR CONDITION: ICD-10-CM

## 2021-03-29 DIAGNOSIS — E78.2 MIXED HYPERLIPIDEMIA: ICD-10-CM

## 2021-03-29 DIAGNOSIS — E10.9 TYPE 1 DIABETES MELLITUS WITHOUT COMPLICATION (HCC): ICD-10-CM

## 2021-03-29 DIAGNOSIS — E10.65 TYPE 1 DIABETES MELLITUS WITH HYPERGLYCEMIA (HCC): ICD-10-CM

## 2021-03-29 DIAGNOSIS — E10.65 TYPE 1 DIABETES MELLITUS WITH HYPERGLYCEMIA (HCC): Primary | ICD-10-CM

## 2021-03-29 LAB
25(OH)D3 SERPL-MCNC: 15.6 NG/ML (ref 30–100)
ALBUMIN SERPL BCP-MCNC: 3.6 G/DL (ref 3.5–5)
ALP SERPL-CCNC: 79 U/L (ref 46–116)
ALT SERPL W P-5'-P-CCNC: 31 U/L (ref 12–78)
ANION GAP SERPL CALCULATED.3IONS-SCNC: 5 MMOL/L (ref 4–13)
AST SERPL W P-5'-P-CCNC: 17 U/L (ref 5–45)
BASOPHILS # BLD AUTO: 0.03 THOUSANDS/ΜL (ref 0–0.1)
BASOPHILS NFR BLD AUTO: 1 % (ref 0–1)
BILIRUB SERPL-MCNC: 0.27 MG/DL (ref 0.2–1)
BUN SERPL-MCNC: 11 MG/DL (ref 5–25)
CALCIUM SERPL-MCNC: 9.3 MG/DL (ref 8.3–10.1)
CHLORIDE SERPL-SCNC: 107 MMOL/L (ref 100–108)
CHOLEST SERPL-MCNC: 248 MG/DL (ref 50–200)
CO2 SERPL-SCNC: 29 MMOL/L (ref 21–32)
CREAT SERPL-MCNC: 1.08 MG/DL (ref 0.6–1.3)
CREAT UR-MCNC: 136 MG/DL
EOSINOPHIL # BLD AUTO: 0.11 THOUSAND/ΜL (ref 0–0.61)
EOSINOPHIL NFR BLD AUTO: 2 % (ref 0–6)
ERYTHROCYTE [DISTWIDTH] IN BLOOD BY AUTOMATED COUNT: 13.4 % (ref 11.6–15.1)
EST. AVERAGE GLUCOSE BLD GHB EST-MCNC: 246 MG/DL
GFR SERPL CREATININE-BSD FRML MDRD: 97 ML/MIN/1.73SQ M
GLUCOSE SERPL-MCNC: 218 MG/DL (ref 65–140)
HBA1C MFR BLD: 10.2 %
HCT VFR BLD AUTO: 46.8 % (ref 36.5–49.3)
HDLC SERPL-MCNC: 40 MG/DL
HGB BLD-MCNC: 15.5 G/DL (ref 12–17)
IMM GRANULOCYTES # BLD AUTO: 0.01 THOUSAND/UL (ref 0–0.2)
IMM GRANULOCYTES NFR BLD AUTO: 0 % (ref 0–2)
LDLC SERPL CALC-MCNC: 135 MG/DL (ref 0–100)
LYMPHOCYTES # BLD AUTO: 1.94 THOUSANDS/ΜL (ref 0.6–4.47)
LYMPHOCYTES NFR BLD AUTO: 37 % (ref 14–44)
MCH RBC QN AUTO: 28.4 PG (ref 26.8–34.3)
MCHC RBC AUTO-ENTMCNC: 33.1 G/DL (ref 31.4–37.4)
MCV RBC AUTO: 86 FL (ref 82–98)
MICROALBUMIN UR-MCNC: 18.2 MG/L (ref 0–20)
MICROALBUMIN/CREAT 24H UR: 13 MG/G CREATININE (ref 0–30)
MONOCYTES # BLD AUTO: 0.36 THOUSAND/ΜL (ref 0.17–1.22)
MONOCYTES NFR BLD AUTO: 7 % (ref 4–12)
NEUTROPHILS # BLD AUTO: 2.81 THOUSANDS/ΜL (ref 1.85–7.62)
NEUTS SEG NFR BLD AUTO: 53 % (ref 43–75)
NONHDLC SERPL-MCNC: 208 MG/DL
NRBC BLD AUTO-RTO: 0 /100 WBCS
PLATELET # BLD AUTO: 166 THOUSANDS/UL (ref 149–390)
PMV BLD AUTO: 11.9 FL (ref 8.9–12.7)
POTASSIUM SERPL-SCNC: 4 MMOL/L (ref 3.5–5.3)
PROT SERPL-MCNC: 8 G/DL (ref 6.4–8.2)
RBC # BLD AUTO: 5.46 MILLION/UL (ref 3.88–5.62)
SODIUM SERPL-SCNC: 141 MMOL/L (ref 136–145)
TRIGL SERPL-MCNC: 363 MG/DL
WBC # BLD AUTO: 5.26 THOUSAND/UL (ref 4.31–10.16)

## 2021-03-29 PROCEDURE — 3008F BODY MASS INDEX DOCD: CPT | Performed by: NURSE PRACTITIONER

## 2021-03-29 PROCEDURE — 3061F NEG MICROALBUMINURIA REV: CPT | Performed by: NURSE PRACTITIONER

## 2021-03-29 PROCEDURE — 80061 LIPID PANEL: CPT

## 2021-03-29 PROCEDURE — 36415 COLL VENOUS BLD VENIPUNCTURE: CPT

## 2021-03-29 PROCEDURE — 83036 HEMOGLOBIN GLYCOSYLATED A1C: CPT

## 2021-03-29 PROCEDURE — 82306 VITAMIN D 25 HYDROXY: CPT

## 2021-03-29 PROCEDURE — 85025 COMPLETE CBC W/AUTO DIFF WBC: CPT

## 2021-03-29 PROCEDURE — 82570 ASSAY OF URINE CREATININE: CPT

## 2021-03-29 PROCEDURE — 80053 COMPREHEN METABOLIC PANEL: CPT

## 2021-03-29 PROCEDURE — 82043 UR ALBUMIN QUANTITATIVE: CPT

## 2021-03-29 PROCEDURE — 1036F TOBACCO NON-USER: CPT | Performed by: NURSE PRACTITIONER

## 2021-03-29 PROCEDURE — 3046F HEMOGLOBIN A1C LEVEL >9.0%: CPT | Performed by: NURSE PRACTITIONER

## 2021-03-29 PROCEDURE — 99213 OFFICE O/P EST LOW 20 MIN: CPT | Performed by: NURSE PRACTITIONER

## 2021-03-29 RX ORDER — FLASH GLUCOSE SCANNING READER
1 EACH MISCELLANEOUS 4 TIMES DAILY PRN
Qty: 1 DEVICE | Refills: 0 | Status: SHIPPED | OUTPATIENT
Start: 2021-03-29 | End: 2022-03-31

## 2021-03-29 RX ORDER — FLASH GLUCOSE SENSOR
1 KIT MISCELLANEOUS
Qty: 4 EACH | Refills: 2 | Status: SHIPPED | OUTPATIENT
Start: 2021-03-29 | End: 2022-03-31

## 2021-03-29 RX ORDER — INSULIN DETEMIR 100 [IU]/ML
50 INJECTION, SOLUTION SUBCUTANEOUS
Qty: 15 ML | Refills: 5 | Status: SHIPPED | OUTPATIENT
Start: 2021-03-29 | End: 2021-11-17 | Stop reason: SDUPTHER

## 2021-03-29 RX ORDER — INSULIN DETEMIR 100 [IU]/ML
25 INJECTION, SOLUTION SUBCUTANEOUS EVERY MORNING
Qty: 15 ML | Refills: 5 | Status: SHIPPED | OUTPATIENT
Start: 2021-03-29 | End: 2021-11-17 | Stop reason: ALTCHOICE

## 2021-03-29 NOTE — PATIENT INSTRUCTIONS
Can take motrin/ibuprofen 600 mg every 6 hours to see if pains in hands improve  If no improvement can follow up with xray  Osteoarthritis   AMBULATORY CARE:   Osteoarthritis  occurs when cartilage (tissue that cushions a joint) wears away slowly and causes the bones to rub together  Osteoarthritis (OA) is a long-term condition that often affects the hands, neck, lower back, knees, and hips  OA is also called arthrosis or degenerative joint disease  Common signs and symptoms include the following:   · Joint pain that gets worse when you move the joint     · Joint stiffness that decreases after you move the joint     · Decreased range of movement     · Hard, bony enlargement on your fingers or toes    · A grinding or cracking sound when you move your joint    Call your doctor or specialist if:   · You have severe pain  · You cannot move your joint  · You have a fever  · Your joint is red and tender  · You have questions or concerns about your condition or care  Treatment for osteoarthritis  may include any of the following:  · Acetaminophen  decreases pain and fever  It is available without a doctor's order  Ask how much to take and how often to take it  Follow directions  Read the labels of all other medicines you are using to see if they also contain acetaminophen, or ask your doctor or pharmacist  Acetaminophen can cause liver damage if not taken correctly  Do not use more than 4 grams (4,000 milligrams) total of acetaminophen in one day  · NSAIDs , such as ibuprofen, help decrease swelling, pain, and fever  This medicine is available with or without a doctor's order  NSAIDs can cause stomach bleeding or kidney problems in certain people  If you take blood thinner medicine, always ask your healthcare provider if NSAIDs are safe for you  Always read the medicine label and follow directions  · Capsaicin cream  may help decrease pain in your joint       · Prescription pain medicine  may be given  Ask your healthcare provider how to take this medicine safely  Some prescription pain medicines contain acetaminophen  Do not take other medicines that contain acetaminophen without talking to your healthcare provider  Too much acetaminophen may cause liver damage  Prescription pain medicine may cause constipation  Ask your healthcare provider how to prevent or treat constipation  · A steroid injection  may be given if your symptoms get worse  · Physical therapy  is used to teach you exercises to help improve movement and strength, and to decrease pain  A physical therapist may move an area with his or her hands  For example, he or she may move your leg in certain ways to treat osteoarthritis in your hip  · Ultrasound  may be used to treat osteoarthritis in certain areas, such as your knee  Ultrasound produces heat that can relieve pain  · Surgery  may be needed if other treatments do not work  Manage your symptoms:   · Stay active  Physical activity may reduce your pain and improve your ability to do daily activities  Avoid activities that cause pain  Ask your healthcare provider what type of exercise would be best for you  · Maintain a healthy weight  This helps decrease the strain on the joints in your back, hips, knees, ankles, and feet  Ask your healthcare provider what a healthy weight is for you  He or she can help you create a weight loss plan if you are overweight  · Use heat or ice on your joints as directed  Heat and ice help decrease pain, swelling, and muscle spasms  For heat, use a heating pad on a low setting for 20 minutes, or take a warm bath  For ice, use an ice pack, or put crushed ice in a plastic bag  Cover it with a towel before you place it on your joint  Use ice for 15 minutes every hour  · Massage the muscles around the joint  Massage helps relieve pain and stiffness  Your healthcare provider or a physical therapist can show you how to do this   If you have hip OA, another person may need to help you massage the area  · Use a cane, crutches, or a walker if directed  These help protect and relieve pressure on your ankle, knee, and hip joints  You may also be prescribed shoe inserts to decrease pressure in your joints  · Wear flat or low-heeled shoes  This will help decrease pain and reduce pressure on your ankle, knee, and hip joints  Follow up with your healthcare provider as directed:  Write down your questions so you remember to ask them during your visits  © Copyright [address] Information is for End User's use only and may not be sold, redistributed or otherwise used for commercial purposes  All illustrations and images included in CareNotes® are the copyrighted property of [employer]  or [facility] Heidi Salazar  The above information is an  only  It is not intended as medical advice for individual conditions or treatments  Talk to your doctor, nurse or pharmacist before following any medical regimen to see if it is safe and effective for you

## 2021-03-29 NOTE — PROGRESS NOTES
INTERNAL MEDICINE FOLLOW-UP OFFICE VISIT  [facility] - [facility]    NAME: Vitaliy Plata  AGE: 35 y o  SEX: male    DATE OF ENCOUNTER: 3/29/2021   Assessment and Plan:     Problem List Items Addressed This Visit        Endocrine    Diabetes mellitus type 1 ([address] ) - Primary       Lab Results   Component Value Date    HGBA1C 9 9 (H) 11/30/2020     Patient's A1C has improved slightly  Will increase Levemir to 50 units HS and 25 units qam  Continue Novolog coverage with meals  Patient to repeat screening labs  Referral provided to Dr Oumou Hyatt for endo consult, patient would like a provider more local  Printed scripts provided for CGM demond  Will follow upin 4-6 weeks to review logs  Relevant Medications    Continuous Blood Gluc Sensor (FreeStyle Demond 14 Day Sensor) MISC    Continuous Blood Gluc  (FreeStyle Demond 14 Day [place]) POLA    insulin detemir (Levemir FlexTouch) 100 Units/mL injection pen    insulin detemir (Levemir FlexTouch) 100 Units/mL injection pen    Other Relevant Orders    Hemoglobin A1C ([facility])    Ambulatory referral to Endocrinology       Other    Mixed hyperlipidemia    Relevant Orders    Lipid panel      Other Visit Diagnoses     Screening for cardiovascular condition        Relevant Orders    CBC and differential    Comprehensive metabolic panel    Vitamin D deficiency        Relevant Orders    Vitamin D 25 hydroxy          No follow-ups on file  Counseling:     · Medication Side Effects - Adverse side effects of medications were reviewed with the patient/guardian today: Yes  · Counseling was given regarding: Prognosis, Risks and benefits of tx options, Intructions for management, Patient and family education, Importance of tx compliance, Risk factor reductions and Impressions    · Barriers to treatment include: No identified barriers      Chief Complaint:     Chief Complaint   Patient presents with    Follow-up     JOINT PAIN - few weeks on - lt pinky, rt thumb         History of Present Illness:     Patient here for routine follow up  Saw endocrinology recently who ordered rybelsus which was not covered, but insurance would allow trulicity  Patient states "I don't know why the ordered this, I've seen the commercials that say this is not for type 1 diabetics " I explained to patient it can be used in conjunction with his insulin however, he does not feel comfortable taking this  States he does not check his sugars very often  Is still working on getting the CGM  Was told at the pharmacy they had one part of the order but not all  I will give him printed scripts today for the [employer] sensor and device  This weekend was his father's birthday and he does admit to not eating well and drinking alcohol  He was feeling fatigued and checked sugar which was in the 400's  Patient states he took insulin to try and correct this and came down into the 320 range  This morning he was again in the low 400's  Normally takes about 25 units with meals and takes his Levemir at night  States in december/january he increased his Levemir to 75 units at bedtime and saw less sugar spikes throughout the day  States he had to stop doing this because he was running out of medication too fast  We discussed that it is possible to increase Levemir, however I advised to split the dosing to 50 units HS and 25 units qam  He will continue his pre meal safia log as previously ordered  Patient also does not like driving all the way to [place] for endo visits  Would like to see someone closer in this area if possible  Also complains of pain in right thumb and left pinky  States he is not sure if the pain started when he was prescribed vitamin D supplementation or if it was after he took prednisone and NSAID  Steroid and meloxicam were prescribed from podiatrist for achilles tendonitis  States he had no pains during this time, and then the finger pains started   He has not taken tylenol or motrin for this because he does not like taking medication if not necessary  Advised to begin motrin 600 mg q 6 hrs prn  If no improvement of pains can consider xray of hands  The following portions of the patient's history were reviewed and updated as appropriate: allergies, current medications, past family history, past medical history, past social history, past surgical history and problem list      Review of Systems:     Review of Systems   Constitutional: Positive for fatigue  Negative for chills and diaphoresis  Respiratory: Negative for shortness of breath  Cardiovascular: Negative for chest pain and palpitations  Gastrointestinal: Negative for diarrhea, nausea and vomiting  Musculoskeletal: Positive for joint swelling and myalgias  Neurological: Negative for dizziness, weakness, light-headedness and headaches  Psychiatric/Behavioral: Negative for sleep disturbance  The patient is not nervous/anxious  Problem List:     Patient Active Problem List   Diagnosis    Diabetes mellitus type 1 ([address] )    Mixed hyperlipidemia    Obesity (BMI 30-39  9)        Objective:     /90 (BP Location: Right arm, Patient Position: Sitting) Comment: gdfgfdg  Pulse 98   Temp 97 9 °F (36 6 °C) (Tympanic) Comment: gdfgdfg  Ht 5' 11" (1 803 m)   Wt 119 kg (263 lb)   SpO2 98%   BMI 36 68 kg/m²     Physical Exam  Vitals signs reviewed  Constitutional:       General: He is not in acute distress  Appearance: Normal appearance  He is well-developed and well-groomed  He is obese  He is not ill-appearing  HENT:      Head: Normocephalic and atraumatic  Cardiovascular:      Rate and Rhythm: Normal rate and regular rhythm  Heart sounds: Normal heart sounds, S1 normal and S2 normal    Pulmonary:      Effort: Pulmonary effort is normal  No accessory muscle usage  Breath sounds: Normal breath sounds  No wheezing     Musculoskeletal:      Right hand: He exhibits tenderness and swelling  Left hand: He exhibits decreased range of motion and tenderness  Hands:    Skin:     General: Skin is warm and dry  Capillary Refill: Capillary refill takes less than 2 seconds  Findings: No rash  Neurological:      General: No focal deficit present  Mental Status: He is alert and oriented to person, place, and time  Motor: Motor function is intact  Psychiatric:         Attention and Perception: Attention and perception normal          Mood and Affect: Mood and affect normal          Speech: Speech normal          Behavior: Behavior normal  Behavior is cooperative  Thought Content: Thought content normal          Pertinent Laboratory/Diagnostic Studies:    Laboratory Results: I have personally reviewed the pertinent laboratory results/reports   Radiology/Other Diagnostic Testing Results: I have personally reviewed pertinent reports  Current Medications:     Current Outpatient Medications   Medication Sig Dispense Refill    atorvastatin (LIPITOR) 20 mg tablet TAKE 1 TABLET BY MOUTH EVERY DAY 90 tablet 0    Continuous Blood Gluc  (FreeStyle Demond 14 Day [place]) POLA Use 1 Device 4 (four) times a day as needed (blood glucose monitoring) 1 Device 0    Continuous Blood Gluc Sensor (FreeStyle Demond 14 Day Sensor) MISC Use 1 application every 14 (fourteen) days 4 each 2    Dulaglutide 0 75 MG/0 5ML SOPN Inject 0 5 mL (0 75 mg total) under the skin once a week 2 mL 11    ergocalciferol (VITAMIN D2) 50,000 units Take 1 capsule (50,000 Units total) by mouth 3 (three) times a week for 20 doses 20 capsule 0    glucose blood test strip 1 each by Other route 4 (four) times a day 120 each 9    insulin aspart (NovoLOG FlexPen) 100 UNIT/ML injection pen With each meal - Inject 1 unit for 5 grams of carbs + 1 unit for every 25 mg/dl above 150mg/dL  Max dose of 60 units per day   15 mL 3    insulin detemir (Levemir FlexTouch) 100 Units/mL injection pen Inject 50 Units under the skin daily at bedtime 15 mL 5    Insulin Pen Needle (PEN NEEDLES 31GX5/16") 31G X 8 MM MISC Use daily inject 5x per day 150 each 1    Multiple Vitamins-Minerals (MULTIVITAMIN ADULT PO) Take by mouth      Omega-3 Fatty Acids (fish oil) 1,000 mg Take 1,000 mg by mouth daily      insulin detemir (Levemir FlexTouch) 100 Units/mL injection pen Inject 25 Units under the skin every morning 15 mL 5     No current facility-administered medications for this visit  Patient Instructions   Can take motrin/ibuprofen 600 mg every 6 hours to see if pains in hands improve  If no improvement can follow up with xray  Osteoarthritis   AMBULATORY CARE:   Osteoarthritis  occurs when cartilage (tissue that cushions a joint) wears away slowly and causes the bones to rub together  Osteoarthritis (OA) is a long-term condition that often affects the hands, neck, lower back, knees, and hips  OA is also called arthrosis or degenerative joint disease  Common signs and symptoms include the following:   · Joint pain that gets worse when you move the joint     · Joint stiffness that decreases after you move the joint     · Decreased range of movement     · Hard, bony enlargement on your fingers or toes    · A grinding or cracking sound when you move your joint    Call your doctor or specialist if:   · You have severe pain  · You cannot move your joint  · You have a fever  · Your joint is red and tender  · You have questions or concerns about your condition or care  Treatment for osteoarthritis  may include any of the following:  · Acetaminophen  decreases pain and fever  It is available without a doctor's order  Ask how much to take and how often to take it  Follow directions  Read the labels of all other medicines you are using to see if they also contain acetaminophen, or ask your doctor or pharmacist  Acetaminophen can cause liver damage if not taken correctly   Do not use more than 4 grams (4,000 milligrams) total of acetaminophen in one day  · NSAIDs , such as ibuprofen, help decrease swelling, pain, and fever  This medicine is available with or without a doctor's order  NSAIDs can cause stomach bleeding or kidney problems in certain people  If you take blood thinner medicine, always ask your healthcare provider if NSAIDs are safe for you  Always read the medicine label and follow directions  · Capsaicin cream  may help decrease pain in your joint  · Prescription pain medicine  may be given  Ask your healthcare provider how to take this medicine safely  Some prescription pain medicines contain acetaminophen  Do not take other medicines that contain acetaminophen without talking to your healthcare provider  Too much acetaminophen may cause liver damage  Prescription pain medicine may cause constipation  Ask your healthcare provider how to prevent or treat constipation  · A steroid injection  may be given if your symptoms get worse  · Physical therapy  is used to teach you exercises to help improve movement and strength, and to decrease pain  A physical therapist may move an area with his or her hands  For example, he or she may move your leg in certain ways to treat osteoarthritis in your hip  · Ultrasound  may be used to treat osteoarthritis in certain areas, such as your knee  Ultrasound produces heat that can relieve pain  · Surgery  may be needed if other treatments do not work  Manage your symptoms:   · Stay active  Physical activity may reduce your pain and improve your ability to do daily activities  Avoid activities that cause pain  Ask your healthcare provider what type of exercise would be best for you  · Maintain a healthy weight  This helps decrease the strain on the joints in your back, hips, knees, ankles, and feet  Ask your healthcare provider what a healthy weight is for you   He or she can help you create a weight loss plan if you are overweight  · Use heat or ice on your joints as directed  Heat and ice help decrease pain, swelling, and muscle spasms  For heat, use a heating pad on a low setting for 20 minutes, or take a warm bath  For ice, use an ice pack, or put crushed ice in a plastic bag  Cover it with a towel before you place it on your joint  Use ice for 15 minutes every hour  · Massage the muscles around the joint  Massage helps relieve pain and stiffness  Your healthcare provider or a physical therapist can show you how to do this  If you have hip OA, another person may need to help you massage the area  · Use a cane, crutches, or a walker if directed  These help protect and relieve pressure on your ankle, knee, and hip joints  You may also be prescribed shoe inserts to decrease pressure in your joints  · Wear flat or low-heeled shoes  This will help decrease pain and reduce pressure on your ankle, knee, and hip joints  Follow up with your healthcare provider as directed:  Write down your questions so you remember to ask them during your visits  © Copyright [address] Information is for End User's use only and may not be sold, redistributed or otherwise used for commercial purposes  All illustrations and images included in CareNotes® are the copyrighted property of A D A M , Inc  or [address] [place] [address]  The above information is an  only  It is not intended as medical advice for individual conditions or treatments  Talk to your doctor, nurse or pharmacist before following any medical regimen to see if it is safe and effective for you          LEILANI Preston  [facility]

## 2021-03-29 NOTE — ASSESSMENT & PLAN NOTE
Lab Results   Component Value Date    HGBA1C 9 9 (H) 11/30/2020     Patient's A1C has improved slightly  Will increase Levemir to 50 units HS and 25 units qam  Continue Novolog coverage with meals  Patient to repeat screening labs  Referral provided to Dr Jame Zapata for endo consult, patient would like a provider more local  Printed scripts provided for CGM milly  Will follow upin 4-6 weeks to review logs

## 2021-03-30 ENCOUNTER — TELEPHONE (OUTPATIENT)
Dept: INTERNAL MEDICINE CLINIC | Facility: CLINIC | Age: 41
End: 2021-03-30

## 2021-03-30 NOTE — TELEPHONE ENCOUNTER
----- Message from Contreras Blanchard sent at 3/30/2021  8:02 AM EDT -----  Please let patient know A1C is back up to a 10  2  cholesterol and triglyceride levels are still elevated, I would recommend increasing atorvastatin to 40 mg daily  Can start taking two tablets a day of the 20 mg and when finished I will send in refill at new strength  Also vitamin D is still fairly low  We can restart weekly vitamin D supplements or he can take an over the counter daily supplement  Thanks

## 2021-04-05 DIAGNOSIS — E10.9 TYPE 1 DIABETES MELLITUS WITHOUT COMPLICATION (HCC): ICD-10-CM

## 2021-04-06 RX ORDER — PEN NEEDLE, DIABETIC 31 GX5/16"
NEEDLE, DISPOSABLE MISCELLANEOUS
Qty: 150 EACH | Refills: 1 | Status: SHIPPED | OUTPATIENT
Start: 2021-04-06 | End: 2021-06-28

## 2021-06-28 DIAGNOSIS — Z76.0 MEDICATION REFILL: ICD-10-CM

## 2021-06-28 DIAGNOSIS — E10.9 TYPE 1 DIABETES MELLITUS WITHOUT COMPLICATION (HCC): ICD-10-CM

## 2021-06-28 RX ORDER — INSULIN ASPART 100 [IU]/ML
INJECTION, SOLUTION INTRAVENOUS; SUBCUTANEOUS
Qty: 60 ML | Refills: 0 | Status: SHIPPED | OUTPATIENT
Start: 2021-06-28 | End: 2021-11-09 | Stop reason: SDUPTHER

## 2021-07-15 ENCOUNTER — TELEPHONE (OUTPATIENT)
Dept: ADMINISTRATIVE | Facility: OTHER | Age: 41
End: 2021-07-15

## 2021-07-15 ENCOUNTER — OFFICE VISIT (OUTPATIENT)
Dept: ENDOCRINOLOGY | Facility: CLINIC | Age: 41
End: 2021-07-15
Payer: COMMERCIAL

## 2021-07-15 VITALS
WEIGHT: 264.13 LBS | BODY MASS INDEX: 36.98 KG/M2 | HEIGHT: 71 IN | SYSTOLIC BLOOD PRESSURE: 118 MMHG | DIASTOLIC BLOOD PRESSURE: 70 MMHG | HEART RATE: 84 BPM

## 2021-07-15 DIAGNOSIS — E55.9 VITAMIN D DEFICIENCY: ICD-10-CM

## 2021-07-15 DIAGNOSIS — E78.2 MIXED HYPERLIPIDEMIA: ICD-10-CM

## 2021-07-15 DIAGNOSIS — E66.9 OBESITY (BMI 30-39.9): ICD-10-CM

## 2021-07-15 DIAGNOSIS — E10.65 TYPE 1 DIABETES MELLITUS WITH HYPERGLYCEMIA (HCC): Primary | ICD-10-CM

## 2021-07-15 LAB — SL AMB POCT HEMOGLOBIN AIC: 10.8 (ref ?–6.5)

## 2021-07-15 PROCEDURE — 1036F TOBACCO NON-USER: CPT | Performed by: INTERNAL MEDICINE

## 2021-07-15 PROCEDURE — 3046F HEMOGLOBIN A1C LEVEL >9.0%: CPT | Performed by: INTERNAL MEDICINE

## 2021-07-15 PROCEDURE — 99214 OFFICE O/P EST MOD 30 MIN: CPT | Performed by: INTERNAL MEDICINE

## 2021-07-15 PROCEDURE — 83036 HEMOGLOBIN GLYCOSYLATED A1C: CPT | Performed by: INTERNAL MEDICINE

## 2021-07-15 PROCEDURE — 3008F BODY MASS INDEX DOCD: CPT | Performed by: INTERNAL MEDICINE

## 2021-07-15 NOTE — TELEPHONE ENCOUNTER
----- Message from Ivonne Silveira sent at 7/15/2021  9:39 AM EDT -----  Regarding: HM DM FOOT EXAM  07/15/21 9:39 AM    Hello, our patient Domingo Francis has had Diabetic Foot Exam completed/performed   Please assist in updating the patient chart by calling the office Dr Mandy Baez DPM  Podiatry Phone: [phone]; Fax: [phone]        Thank you,  Arti Murillo  [facility]

## 2021-07-15 NOTE — TELEPHONE ENCOUNTER
----- Message from Wendy Pod sent at 7/15/2021  9:39 AM EDT -----  Regarding: HM DM FOOT EXAM  07/15/21 9:39 AM    Hello, our patient Joseph Cortez has had Diabetic Foot Exam completed/performed   Please assist in updating the patient chart by calling the office Dr Huma Navarrete DPM  Podiatry Phone: [phone]; Fax: [phone]        Thank you,  Atri Murillo  [facility]

## 2021-07-15 NOTE — PROGRESS NOTES
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  He reports polyuria, polydipsia but no weight loss  He was previously on a [employer]tr[employer] insulin pump with a sensor but was unable to tolerate it       Home glucose check: checks 1-2/week  200-300mg/dL range in the morning      Diet: does not carb count presently; admits to indiscretion  Eats 3-4meals per day but carb rich diet  Home blood glucose monitoring: Checks occasionally  Hypoglycemia:     Current meds:  Levemir 50u daily bedtime and 25 in morning  Novolog 10 units/meal usually 4/day 7-11-3p-7p     Opthamology: 5/27/20 - due  Podiatry: No  Influenza:   Dental:No  Pancreatitis: No        Ace/ARB:No  Statin: lipitor  Thyroid issues: No    Patient Active Problem List   Diagnosis    Diabetes mellitus type 1 ([facility] )    Mixed hyperlipidemia    Obesity (BMI 30-39  9)     Past Medical History:   Diagnosis Date    Diabetes mellitus type 1 ([facility] )       No past surgical history on file     Family History   Problem Relation Age of Onset    Diabetes Mother     Diabetes Father      Social History     Tobacco Use    Smoking status: Never Smoker    Smokeless tobacco: Never Used   Substance Use Topics    Alcohol use: Yes     Comment: on occasion     No Known Allergies      Current Outpatient Medications:     atorvastatin (LIPITOR) 20 mg tablet, TAKE 1 TABLET BY MOUTH EVERY DAY, Disp: 90 tablet, Rfl: 0    B-D ULTRAFINE III SHORT PEN 31G X 8 MM MISC, BY DOES NOT APPLY ROUTE DAILY INJECT 5X PER DAY, Disp: 100 each, Rfl: 2    insulin aspart (NovoLOG FlexPen) 100 UNIT/ML injection pen, With each meal - Inject 1 unit for 5 grams of carbs + 1 unit for every 25 mg/dl above 150mg/dL  Max dose of 60 units per day , Disp: 60 mL, Rfl: 0    insulin detemir (Levemir FlexTouch) 100 Units/mL injection pen, Inject 50 Units under the skin daily at bedtime, Disp: 15 mL, Rfl: 5    insulin detemir (Levemir FlexTouch) 100 Units/mL injection pen, Inject 25 Units under the skin every morning, Disp: 15 mL, Rfl: 5    Multiple Vitamins-Minerals (MULTIVITAMIN ADULT PO), Take by mouth, Disp: , Rfl:     Omega-3 Fatty Acids (fish oil) 1,000 mg, Take 1,000 mg by mouth daily, Disp: , Rfl:     Continuous Blood Gluc  (FreeStyle Demond 14 Day [place]) POLA, Use 1 Device 4 (four) times a day as needed (blood glucose monitoring) (Patient not taking: Reported on 7/15/2021), Disp: 1 Device, Rfl: 0    Continuous Blood Gluc Sensor (FreeStyle Demond 14 Day Sensor) MISC, Use 1 application every 14 (fourteen) days (Patient not taking: Reported on 7/15/2021), Disp: 4 each, Rfl: 2    Dulaglutide 0 75 MG/0 5ML SOPN, Inject 0 5 mL (0 75 mg total) under the skin once a week (Patient not taking: Reported on 7/15/2021), Disp: 2 mL, Rfl: 11    ergocalciferol (VITAMIN D2) 50,000 units, Take 1 capsule (50,000 Units total) by mouth 3 (three) times a week for 20 doses (Patient not taking: Reported on 7/15/2021), Disp: 20 capsule, Rfl: 0    glucose blood test strip, 1 each by Other route 4 (four) times a day (Patient not taking: Reported on 7/15/2021), Disp: 120 each, Rfl: 9    Review of Systems   Constitutional: Positive for activity change, appetite change and fatigue  HENT: Negative  Eyes: Negative  Respiratory: Negative  Cardiovascular: Negative for chest pain  Gastrointestinal: Negative  Endocrine: Negative  Genitourinary: Negative  Musculoskeletal: Negative  Skin: Negative  Allergic/Immunologic: Negative  Neurological: Negative      Hematological: Negative  Psychiatric/Behavioral: Negative  All other systems reviewed and are negative  Physical Exam:  Body mass index is 36 84 kg/m²  /70 (BP Location: Left arm, Patient Position: Sitting, Cuff Size: Large)   Pulse 84   Ht 5' 11" (1 803 m)   Wt 120 kg (264 lb 2 oz)   BMI 36 84 kg/m²    Vitals:    07/15/21 0930   Weight: 120 kg (264 lb 2 oz)        Physical Exam  Constitutional:       Appearance: He is well-developed  HENT:      Head: Normocephalic  Eyes:      Pupils: Pupils are equal, round, and reactive to light  Neck:      Thyroid: No thyromegaly  Cardiovascular:      Rate and Rhythm: Normal rate  Pulses: no weak pulses          Dorsalis pedis pulses are 2+ on the right side and 2+ on the left side  Posterior tibial pulses are 2+ on the right side and 2+ on the left side  Heart sounds: Normal heart sounds  Pulmonary:      Effort: Pulmonary effort is normal       Breath sounds: Normal breath sounds  Abdominal:      General: Bowel sounds are normal       Palpations: Abdomen is soft  Musculoskeletal:         General: No deformity  Cervical back: Normal range of motion  Feet:      Right foot:      Skin integrity: Callus and dry skin present  Left foot:      Skin integrity: Callus and dry skin present  Skin:     Capillary Refill: Capillary refill takes less than 2 seconds  Coloration: Skin is not pale  Findings: No rash  Neurological:      Mental Status: He is alert and oriented to person, place, and time  Patient's shoes and socks removed  Right Foot/Ankle   Right Foot Inspection  Skin Exam: dry skin, callus and callus                          Toe Exam: ROM and strength within normal limits  Sensory   Vibration: diminished  Proprioception: diminished   Monofilament testing: absent  Vascular  Capillary refills: < 3 seconds  The right DP pulse is 2+  The right PT pulse is 2+       Left Foot/Ankle  Left Foot Inspection  Skin Exam: dry skin and callus                         Toe Exam: ROM and strength within normal limits                   Sensory   Vibration: diminished  Proprioception: diminished  Monofilament: absent  Vascular  Capillary refills: < 3 seconds  The left DP pulse is 2+  The left PT pulse is 2+  Assign Risk Category:  No deformity present; Loss of protective sensation; No weak pulses       Risk: 1      Labs:   Lab Results   Component Value Date    HGBA1C 10 8 (A) 07/15/2021       Lab Results   Component Value Date    [id number] 1 310 06/16/2020       Lab Results   Component Value Date    CREATININE 1 08 03/29/2021    CREATININE 1 00 06/16/2020    BUN 11 03/29/2021    K 4 0 03/29/2021     03/29/2021    CO2 29 03/29/2021     eGFR   Date Value Ref Range Status   03/29/2021 97 ml/min/1 73sq m Final       Lab Results   Component Value Date    ALT 31 03/29/2021    AST 17 03/29/2021    ALKPHOS 79 03/29/2021       Lab Results   Component Value Date    CHOLESTEROL 248 (H) 03/29/2021    CHOLESTEROL 252 (H) 11/30/2020    CHOLESTEROL 270 (H) 06/16/2020     Lab Results   Component Value Date    HDL 40 03/29/2021    HDL 50 11/30/2020    HDL 50 06/16/2020     Lab Results   Component Value Date    TRIG 363 (H) 03/29/2021    TRIG 277 (H) 11/30/2020    TRIG 137 06/16/2020     Lab Results   Component Value Date    [place] 208 03/29/2021    [place] 202 11/30/2020    [place] 220 06/16/2020         Impression:  1  Type 1 diabetes mellitus with hyperglycemia (HCC)    2  Obesity (BMI 30-39 9)    3  Mixed hyperlipidemia         Plan:    Harriet Alva was seen today for diabetes type 1  Diagnoses and all orders for this visit:    Type 1 diabetes mellitus with hyperglycemia ([address] )  He is uncontrolled with recent deterioration due to loss of adherence  His A1c today was 10 8%  Goal is 7%  He has developed insulin resistance and should be treated for combined type 1 and type 2 diabetes      Today we discussed risks of uncontrolled hyperglycemia, flexible insulin therapy, use of medications to improve insulin sensitivity including metformin and GLP1 agonist  Reiterated to restart Trulicity - will do it this time  Advised to send glucose logs, contact [facility] to procure personal CGM, contact podiatry(referral made last visit), continue current insulin regimen and carb consistent diet  Advised to avoid fast food  Follow up in 6-8 weeks  -     POCT hemoglobin A1c  -     Ambulatory referral to Diabetic Education; Future    Obesity (BMI 30-39  9)    Mixed hyperlipidemia    Vitamin D deficiency        I have spent 25 minutes with patient today in which greater than 50% of this time was spent in counseling/coordination of care  Discussed with the patient and all questioned fully answered  He will call me if any problems arise  Educated/ Counseled patient on diagnostic test results, prognosis, risk vs benefit of treatment options, importance of treatment compliance, healthy life and lifestyle choices        [address]

## 2021-07-15 NOTE — TELEPHONE ENCOUNTER
Upon review of the In Basket request and the patient's chart, initial outreach has been made via telephone call, please see Contacts section for details       Thank you  Oneil Sanchez MA

## 2021-07-21 NOTE — TELEPHONE ENCOUNTER
Upon review of the In Basket request we were able to locate, review, and update the patient chart as requested for Diabetic Foot Exam     Any additional questions or concerns should be emailed to the Practice Liaisons via [email or web address] email, please do not reply via In Basket      Thank you  Delroy Dewey MA

## 2021-07-23 ENCOUNTER — TELEPHONE (OUTPATIENT)
Dept: ENDOCRINOLOGY | Facility: CLINIC | Age: 41
End: 2021-07-23

## 2021-09-02 ENCOUNTER — OFFICE VISIT (OUTPATIENT)
Dept: ENDOCRINOLOGY | Facility: CLINIC | Age: 41
End: 2021-09-02
Payer: COMMERCIAL

## 2021-09-02 VITALS
WEIGHT: 260 LBS | SYSTOLIC BLOOD PRESSURE: 132 MMHG | BODY MASS INDEX: 36.4 KG/M2 | HEART RATE: 96 BPM | DIASTOLIC BLOOD PRESSURE: 96 MMHG | HEIGHT: 71 IN

## 2021-09-02 DIAGNOSIS — G47.33 OSA (OBSTRUCTIVE SLEEP APNEA): ICD-10-CM

## 2021-09-02 DIAGNOSIS — E55.9 VITAMIN D DEFICIENCY: ICD-10-CM

## 2021-09-02 DIAGNOSIS — E66.9 OBESITY (BMI 30-39.9): ICD-10-CM

## 2021-09-02 DIAGNOSIS — E10.65 TYPE 1 DIABETES MELLITUS WITH HYPERGLYCEMIA (HCC): Primary | ICD-10-CM

## 2021-09-02 DIAGNOSIS — E78.2 MIXED HYPERLIPIDEMIA: ICD-10-CM

## 2021-09-02 PROCEDURE — 3075F SYST BP GE 130 - 139MM HG: CPT | Performed by: INTERNAL MEDICINE

## 2021-09-02 PROCEDURE — 3080F DIAST BP >= 90 MM HG: CPT | Performed by: INTERNAL MEDICINE

## 2021-09-02 PROCEDURE — 99214 OFFICE O/P EST MOD 30 MIN: CPT | Performed by: INTERNAL MEDICINE

## 2021-09-02 NOTE — LETTER
Medical Fitness Referral    Patient: Sean Mcgill  : 1980  MRN: [id number]  Address: [address] [place] [address] [place] [address]  Phone Number: [phone]  E-mail: [email or web address]    [] Medical Disorders (Ex  Diabetes)   [] Cardiovascular Disorders (Ex  Hypertension)   [] Pulmonary Disorders (Ex  Asthma)   [] Cancer Disorders (Ex  Breast, Prostate)   [] Immunological & Hematological Disorders (Rheumatoid Arthritis)   [] Neurological Disorders (Ex  Parkinson's Disease)   [] Cognitive Disorders (Ex  Dementia)   [] Children & Adolescents (Ex  BMI)   [] Older Adults (Ex  Balance & Ambulation)   [] Female Specific Disorders (Ex  Osteoporosis)       Diagnoses:   1  Type 1 diabetes mellitus with hyperglycemia [facility])  Ambulatory referral to Sleep Medicine    Ambulatory Referral to Ophthalmology    Hemoglobin A1C    Microalbumin / creatinine urine ratio    Lipid panel   2  Obesity (BMI 30-39 9)     3  SHASHANK (obstructive sleep apnea)     4  Mixed hyperlipidemia     5   Vitamin D deficiency  Vitamin D 25 hydroxy     Additional Comments:    Service Requested:  [x] Medical Fitness Consult- Screening For Appropriateness of Medical Fitness Program   [x] Medical Fitness Program- Assessment of Body Composition, Aerobic, Anaerobic, Muscle Strength & Endurance, Flexibility, Neuromuscular & Functional Fitness   [x] Medical Fitness Assessment- Individualized Evidence-Based Exercise Program       Requesting Provider: Christopher Reddy MD  Date: 21

## 2021-09-02 NOTE — PROGRESS NOTES
Follow-up Patient Progress Note      CC: type 1 diabetes    History of Present Illness:   Otto Altman is a 41 y  o  male with history of type 1 diabetes since age 3 associated with peripheral neuropathy, micro albuminuria and possible retinopathy, obesity, vit D deficiency and HLD  No history of heart attacks strokes or intermittent claudication  Last visit was 7/15/2021      Since last visit, his polyuria has improved, He reports polyuria, polydipsia but no blurred vision  He has cut soda/juices and switched to flavored water  He reports 5lb weight loss  He was previously on a Medtronic insulin pump with a sensor but was unable to tolerate it       Home glucose check: checks 1-2/week  200-300mg/dL range in the morning      Diet: does not carb count presently; admits to indiscretion  Eats 3-4meals per day but carb rich diet  Home blood glucose monitoring: Checks twice daily  Have logs for a few days only  is   Br: 120-190mg/dL and 200-250mg/dL  Hypoglycemia: No     Current meds:  Levemir 50u daily bedtime  Novolog 10 units/meal usually 4/day 7-11-3p-7p     Opthamology: 5/27/20 - will shcedule  Podiatry: No  Influenza: COVID - Not yet says  Dental:No  Pancreatitis: No     Ace/ARB:No  Statin: lipitor  Thyroid issues: No    Patient Active Problem List   Diagnosis    Diabetes mellitus type 1 ([facility] )    Mixed hyperlipidemia    Obesity (BMI 30-39  9)     Past Medical History:   Diagnosis Date    Diabetes mellitus type 1 ([facility] )       No past surgical history on file     Family History   Problem Relation Age of Onset    Diabetes Mother     Diabetes Father      Social History     Tobacco Use    Smoking status: Never Smoker    Smokeless tobacco: Never Used   Substance Use Topics    Alcohol use: Yes     Comment: on occasion     No Known Allergies      Current Outpatient Medications:     atorvastatin (LIPITOR) 20 mg tablet, TAKE 1 TABLET BY MOUTH EVERY DAY, Disp: 90 tablet, Rfl: 0    B-D ULTRAFINE III SHORT PEN 31G X 8 MM MISC, BY DOES NOT APPLY ROUTE DAILY INJECT 5X PER DAY, Disp: 100 each, Rfl: 2    glucose blood test strip, 1 each by Other route 4 (four) times a day, Disp: 120 each, Rfl: 9    insulin aspart (NovoLOG FlexPen) 100 UNIT/ML injection pen, With each meal - Inject 1 unit for 5 grams of carbs + 1 unit for every 25 mg/dl above 150mg/dL  Max dose of 60 units per day , Disp: 60 mL, Rfl: 0    insulin detemir (Levemir FlexTouch) 100 Units/mL injection pen, Inject 50 Units under the skin daily at bedtime, Disp: 15 mL, Rfl: 5    Multiple Vitamins-Minerals (MULTIVITAMIN ADULT PO), Take by mouth, Disp: , Rfl:     Omega-3 Fatty Acids (fish oil) 1,000 mg, Take 1,000 mg by mouth daily, Disp: , Rfl:     VITAMIN D PO, Take 1,000 Units by mouth daily, Disp: , Rfl:     Continuous Blood Gluc  (FreeStyle Demond 14 Day [place]) POLA, Use 1 Device 4 (four) times a day as needed (blood glucose monitoring) (Patient not taking: Reported on 7/15/2021), Disp: 1 Device, Rfl: 0    Continuous Blood Gluc Sensor (FreeStyle Demond 14 Day Sensor) MISC, Use 1 application every 14 (fourteen) days (Patient not taking: Reported on 7/15/2021), Disp: 4 each, Rfl: 2    Dulaglutide 0 75 MG/0 5ML SOPN, Inject 0 5 mL (0 75 mg total) under the skin once a week (Patient not taking: Reported on 7/15/2021), Disp: 2 mL, Rfl: 11    ergocalciferol (VITAMIN D2) 50,000 units, Take 1 capsule (50,000 Units total) by mouth 3 (three) times a week for 20 doses (Patient not taking: Reported on 7/15/2021), Disp: 20 capsule, Rfl: 0    insulin detemir (Levemir FlexTouch) 100 Units/mL injection pen, Inject 25 Units under the skin every morning (Patient not taking: Reported on 9/2/2021), Disp: 15 mL, Rfl: 5    Review of Systems   Constitutional: Positive for activity change, appetite change and fatigue  HENT: Negative  Eyes: Negative  Respiratory: Negative  Cardiovascular: Negative for chest pain  Gastrointestinal: Negative      Endocrine: Negative  Genitourinary: Negative  Musculoskeletal: Negative  Skin: Negative  Allergic/Immunologic: Negative  Neurological: Negative  Hematological: Negative  Psychiatric/Behavioral: Negative  All other systems reviewed and are negative  Physical Exam:  Body mass index is 36 26 kg/m²  /96 (BP Location: Left arm, Patient Position: Sitting, Cuff Size: Large)   Pulse 96   Ht 5' 11" (1 803 m)   Wt 118 kg (260 lb)   BMI 36 26 kg/m²    Vitals:    09/02/21 0938   Weight: 118 kg (260 lb)        Physical Exam  Constitutional:       Appearance: He is well-developed  HENT:      Head: Normocephalic  Eyes:      Pupils: Pupils are equal, round, and reactive to light  Neck:      Thyroid: No thyromegaly  Cardiovascular:      Rate and Rhythm: Normal rate  Heart sounds: Normal heart sounds  Pulmonary:      Effort: Pulmonary effort is normal       Breath sounds: Normal breath sounds  Abdominal:      General: Bowel sounds are normal       Palpations: Abdomen is soft  Musculoskeletal:         General: No deformity  Cervical back: Normal range of motion  Skin:     Capillary Refill: Capillary refill takes less than 2 seconds  Coloration: Skin is not pale  Findings: No rash  Neurological:      Mental Status: He is alert and oriented to person, place, and time           Labs:   Lab Results   Component Value Date    HGBA1C 10 8 (A) 07/15/2021       Lab Results   Component Value Date    [id number] 1 310 06/16/2020       Lab Results   Component Value Date    CREATININE 1 08 03/29/2021    CREATININE 1 00 06/16/2020    BUN 11 03/29/2021    K 4 0 03/29/2021     03/29/2021    CO2 29 03/29/2021     eGFR   Date Value Ref Range Status   03/29/2021 97 ml/min/1 73sq m Final       Lab Results   Component Value Date    ALT 31 03/29/2021    AST 17 03/29/2021    ALKPHOS 79 03/29/2021       Lab Results   Component Value Date    CHOLESTEROL 248 (H) 03/29/2021    CHOLESTEROL 252 (H) 11/30/2020    CHOLESTEROL 270 (H) 06/16/2020     Lab Results   Component Value Date    HDL 40 03/29/2021    HDL 50 11/30/2020    HDL 50 06/16/2020     Lab Results   Component Value Date    TRIG 363 (H) 03/29/2021    TRIG 277 (H) 11/30/2020    TRIG 137 06/16/2020     Lab Results   Component Value Date    [place] 208 03/29/2021    [place] 202 11/30/2020    [place] 220 06/16/2020         Impression:  1  Type 1 diabetes mellitus with hyperglycemia (HCC)    2  Obesity (BMI 30-39 9)    3  SHASHANK (obstructive sleep apnea)    4  Mixed hyperlipidemia         Plan:    Tahir Barba was seen today for diabetes type 1  Diagnoses and all orders for this visit:    Type 1 diabetes mellitus with hyperglycemia ([facility] )  He remains uncontrolled with inability to manoever lifestyle changes and diet  Recent A1c was 10 8%  Foal is 7%  Today I reiterated associated risks of long term hyperglycemia including retinopathy, neuropathy and DPN  He has more peripheral neuropathy symptoms as expected  Will r/o underlying SHASHANK that can worsen diabetes, weight gain and hypertension  Advised to send glucose logs after he returns home  He was also given info again to help set up a personal CGM that was ordered last visit  Advised enrollment in medical fitness program   Follow up in 3 months  May call or send [employer] message in interim as needed  -     Ambulatory referral to Sleep Medicine; Future  -     Ambulatory Referral to Ophthalmology; Future  -     Hemoglobin A1C; Future  -     Microalbumin / creatinine urine ratio; Future  -     Lipid panel; Future    Obesity (BMI 30-39 9)    SHASHANK (obstructive sleep apnea)    Mixed hyperlipidemia    Vitamin D deficiency  -     Vitamin D 25 hydroxy; Future        I have spent 30 minutes with patient today in which greater than 50% of this time was spent in counseling/coordination of care  Discussed with the patient and all questioned fully answered   He will call me if any problems arise     Educated/ Counseled patient on diagnostic test results, prognosis, risk vs benefit of treatment options, importance of treatment compliance, healthy life and lifestyle choices       we  [address]ne

## 2021-10-01 DIAGNOSIS — E10.9 TYPE 1 DIABETES MELLITUS WITHOUT COMPLICATION (HCC): ICD-10-CM

## 2021-10-01 RX ORDER — PEN NEEDLE, DIABETIC 31 GX5/16"
NEEDLE, DISPOSABLE MISCELLANEOUS
Qty: 100 EACH | Refills: 2 | Status: SHIPPED | OUTPATIENT
Start: 2021-10-01 | End: 2022-01-20 | Stop reason: SDUPTHER

## 2021-11-09 DIAGNOSIS — Z76.0 MEDICATION REFILL: ICD-10-CM

## 2021-11-09 DIAGNOSIS — E10.9 TYPE 1 DIABETES MELLITUS WITHOUT COMPLICATION (HCC): ICD-10-CM

## 2021-11-09 RX ORDER — INSULIN ASPART 100 [IU]/ML
INJECTION, SOLUTION INTRAVENOUS; SUBCUTANEOUS
Qty: 60 ML | Refills: 1 | Status: SHIPPED | OUTPATIENT
Start: 2021-11-09 | End: 2021-11-17 | Stop reason: SDUPTHER

## 2021-11-17 ENCOUNTER — OFFICE VISIT (OUTPATIENT)
Dept: ENDOCRINOLOGY | Facility: CLINIC | Age: 41
End: 2021-11-17
Payer: COMMERCIAL

## 2021-11-17 VITALS
HEART RATE: 98 BPM | BODY MASS INDEX: 34.48 KG/M2 | DIASTOLIC BLOOD PRESSURE: 80 MMHG | SYSTOLIC BLOOD PRESSURE: 140 MMHG | HEIGHT: 72 IN | WEIGHT: 254.6 LBS

## 2021-11-17 DIAGNOSIS — E10.65 TYPE 1 DIABETES MELLITUS WITH HYPERGLYCEMIA (HCC): Primary | ICD-10-CM

## 2021-11-17 DIAGNOSIS — E66.9 OBESITY (BMI 30-39.9): ICD-10-CM

## 2021-11-17 DIAGNOSIS — E10.9 TYPE 1 DIABETES MELLITUS WITHOUT COMPLICATION (HCC): ICD-10-CM

## 2021-11-17 DIAGNOSIS — Z76.0 MEDICATION REFILL: ICD-10-CM

## 2021-11-17 DIAGNOSIS — E78.2 MIXED HYPERLIPIDEMIA: ICD-10-CM

## 2021-11-17 DIAGNOSIS — E55.9 VITAMIN D DEFICIENCY: ICD-10-CM

## 2021-11-17 PROCEDURE — 99214 OFFICE O/P EST MOD 30 MIN: CPT | Performed by: INTERNAL MEDICINE

## 2021-11-17 PROCEDURE — 3077F SYST BP >= 140 MM HG: CPT | Performed by: INTERNAL MEDICINE

## 2021-11-17 PROCEDURE — 3079F DIAST BP 80-89 MM HG: CPT | Performed by: INTERNAL MEDICINE

## 2021-11-17 RX ORDER — INSULIN ASPART 100 [IU]/ML
INJECTION, SOLUTION INTRAVENOUS; SUBCUTANEOUS
Qty: 60 ML | Refills: 1 | Status: SHIPPED | OUTPATIENT
Start: 2021-11-17 | End: 2022-08-01 | Stop reason: SDUPTHER

## 2021-11-17 RX ORDER — INSULIN DETEMIR 100 [IU]/ML
INJECTION, SOLUTION SUBCUTANEOUS
Qty: 15 ML | Refills: 5 | Status: SHIPPED | OUTPATIENT
Start: 2021-11-17 | End: 2022-06-10 | Stop reason: SDUPTHER

## 2021-11-29 ENCOUNTER — OFFICE VISIT (OUTPATIENT)
Dept: DIABETES SERVICES | Facility: CLINIC | Age: 41
End: 2021-11-29
Payer: COMMERCIAL

## 2021-11-29 ENCOUNTER — TELEPHONE (OUTPATIENT)
Dept: ENDOCRINOLOGY | Facility: CLINIC | Age: 41
End: 2021-11-29

## 2021-11-29 DIAGNOSIS — E10.65 TYPE 1 DIABETES MELLITUS WITH HYPERGLYCEMIA (HCC): Primary | ICD-10-CM

## 2021-11-29 PROCEDURE — G0108 DIAB MANAGE TRN  PER INDIV: HCPCS | Performed by: DIETITIAN, REGISTERED

## 2021-12-29 ENCOUNTER — VBI (OUTPATIENT)
Dept: ADMINISTRATIVE | Facility: OTHER | Age: 41
End: 2021-12-29

## 2021-12-30 ENCOUNTER — OFFICE VISIT (OUTPATIENT)
Dept: ENDOCRINOLOGY | Facility: CLINIC | Age: 41
End: 2021-12-30
Payer: COMMERCIAL

## 2021-12-30 VITALS
SYSTOLIC BLOOD PRESSURE: 150 MMHG | BODY MASS INDEX: 34.54 KG/M2 | WEIGHT: 255 LBS | HEART RATE: 108 BPM | HEIGHT: 72 IN | DIASTOLIC BLOOD PRESSURE: 90 MMHG

## 2021-12-30 DIAGNOSIS — E66.9 OBESITY (BMI 30-39.9): ICD-10-CM

## 2021-12-30 DIAGNOSIS — E10.65 TYPE 1 DIABETES MELLITUS WITH HYPERGLYCEMIA (HCC): Primary | ICD-10-CM

## 2021-12-30 DIAGNOSIS — E10.9 TYPE 1 DIABETES MELLITUS WITHOUT COMPLICATION (HCC): ICD-10-CM

## 2021-12-30 DIAGNOSIS — E78.2 MIXED HYPERLIPIDEMIA: ICD-10-CM

## 2021-12-30 LAB — SL AMB POCT HEMOGLOBIN AIC: 9.4 (ref ?–6.5)

## 2021-12-30 PROCEDURE — 99214 OFFICE O/P EST MOD 30 MIN: CPT | Performed by: INTERNAL MEDICINE

## 2021-12-30 PROCEDURE — 83036 HEMOGLOBIN GLYCOSYLATED A1C: CPT | Performed by: INTERNAL MEDICINE

## 2021-12-30 PROCEDURE — 3077F SYST BP >= 140 MM HG: CPT | Performed by: INTERNAL MEDICINE

## 2021-12-30 PROCEDURE — 3080F DIAST BP >= 90 MM HG: CPT | Performed by: INTERNAL MEDICINE

## 2021-12-30 PROCEDURE — 3046F HEMOGLOBIN A1C LEVEL >9.0%: CPT | Performed by: INTERNAL MEDICINE

## 2022-01-20 DIAGNOSIS — E10.9 TYPE 1 DIABETES MELLITUS WITHOUT COMPLICATION (HCC): ICD-10-CM

## 2022-01-20 RX ORDER — PEN NEEDLE, DIABETIC 31 GX5/16"
NEEDLE, DISPOSABLE MISCELLANEOUS DAILY
Qty: 100 EACH | Refills: 2 | Status: SHIPPED | OUTPATIENT
Start: 2022-01-20 | End: 2022-05-10 | Stop reason: SDUPTHER

## 2022-03-31 ENCOUNTER — OFFICE VISIT (OUTPATIENT)
Dept: ENDOCRINOLOGY | Facility: CLINIC | Age: 42
End: 2022-03-31
Payer: COMMERCIAL

## 2022-03-31 VITALS
SYSTOLIC BLOOD PRESSURE: 140 MMHG | HEIGHT: 72 IN | DIASTOLIC BLOOD PRESSURE: 100 MMHG | BODY MASS INDEX: 35.73 KG/M2 | WEIGHT: 263.8 LBS | HEART RATE: 89 BPM

## 2022-03-31 DIAGNOSIS — E78.2 MIXED HYPERLIPIDEMIA: ICD-10-CM

## 2022-03-31 DIAGNOSIS — E10.9 TYPE 1 DIABETES MELLITUS WITHOUT COMPLICATION (HCC): Primary | ICD-10-CM

## 2022-03-31 DIAGNOSIS — E55.9 VITAMIN D DEFICIENCY: ICD-10-CM

## 2022-03-31 DIAGNOSIS — E78.00 HYPERCHOLESTEREMIA: ICD-10-CM

## 2022-03-31 DIAGNOSIS — E66.9 OBESITY (BMI 30-39.9): ICD-10-CM

## 2022-03-31 LAB — SL AMB POCT HEMOGLOBIN AIC: 9.9 (ref ?–6.5)

## 2022-03-31 PROCEDURE — 3046F HEMOGLOBIN A1C LEVEL >9.0%: CPT | Performed by: INTERNAL MEDICINE

## 2022-03-31 PROCEDURE — 3080F DIAST BP >= 90 MM HG: CPT | Performed by: INTERNAL MEDICINE

## 2022-03-31 PROCEDURE — 99214 OFFICE O/P EST MOD 30 MIN: CPT | Performed by: INTERNAL MEDICINE

## 2022-03-31 PROCEDURE — 83036 HEMOGLOBIN GLYCOSYLATED A1C: CPT | Performed by: INTERNAL MEDICINE

## 2022-03-31 PROCEDURE — 3077F SYST BP >= 140 MM HG: CPT | Performed by: INTERNAL MEDICINE

## 2022-03-31 RX ORDER — ATORVASTATIN CALCIUM 20 MG/1
40 TABLET, FILM COATED ORAL DAILY
Qty: 90 TABLET | Refills: 0 | Status: SHIPPED | OUTPATIENT
Start: 2022-03-31 | End: 2022-06-28 | Stop reason: DRUGHIGH

## 2022-03-31 RX ORDER — BLOOD-GLUCOSE TRANSMITTER
1 EACH MISCELLANEOUS CONTINUOUS
Qty: 3 EACH | Refills: 0 | Status: SHIPPED | OUTPATIENT
Start: 2022-03-31 | End: 2022-04-05 | Stop reason: ALTCHOICE

## 2022-03-31 RX ORDER — BLOOD-GLUCOSE,RECEIVER,CONT
1 EACH MISCELLANEOUS CONTINUOUS
Qty: 1 EACH | Refills: 0 | Status: SHIPPED | OUTPATIENT
Start: 2022-03-31 | End: 2022-04-05 | Stop reason: ALTCHOICE

## 2022-03-31 RX ORDER — BLOOD-GLUCOSE SENSOR
1 EACH MISCELLANEOUS
Qty: 12 EACH | Refills: 0 | Status: SHIPPED | OUTPATIENT
Start: 2022-03-31 | End: 2022-04-05 | Stop reason: ALTCHOICE

## 2022-03-31 RX ORDER — ERGOCALCIFEROL 1.25 MG/1
50000 CAPSULE ORAL WEEKLY
Qty: 20 CAPSULE | Refills: 0 | Status: SHIPPED | OUTPATIENT
Start: 2022-03-31 | End: 2022-06-20 | Stop reason: SDUPTHER

## 2022-03-31 NOTE — PROGRESS NOTES
Follow-up Patient Progress Note      CC: type 1 diabetes    History of Present Illness:   Otto Altman is a 42 y  o  male with type 1 diabetes since age 3, peripheral neuropathy, microalbuminuria, probable retinopathy, obesity, vit D deficiency and HLD  No history of MI, CVA or intermittent claudication  Last visit was 12/30/2021      Since last visit, he gained 8lbs  A1c 9 9%  He is now a single parent of his 3year old toddler and reports poor sleep  Going to gym regularly over past 2 weeks      He was previously on a Medtronic insulin pump with a sensor but was unable to tolerate it       Home glucose check: checks 1-2/day on average  His regimen is basal heavy  Wishes to start milly     Diet: does not carb count presently; admits to indiscretion  Eats 3meals per day and frequent late night snack  Hypoglycemia: No     Current meds:  Levemir 50u daily bedtime and levemir 25u AM  Novolog 12-15 units/meal 7a-11a-3p     Opthamology: waiting  Podiatry: No  Influenza: COVID - Not yet  Dental:No  Pancreatitis: No     Ace/ARB:No  Statin: lipitor 40  Thyroid issues: No    Patient Active Problem List   Diagnosis    [facility] ([facility] )    Mixed hyperlipidemia    Obesity (BMI 30-39  9)     Past Medical History:   Diagnosis Date    Diabetes mellitus type 1 ([facility] )       Past Surgical History:   Procedure Laterality Date    NO PAST SURGERIES        Family History   Problem Relation Age of Onset    Diabetes Mother     Diabetes Father      Social History     Tobacco Use    Smoking status: Never Smoker    Smokeless tobacco: Never Used   Substance Use Topics    Alcohol use: Not Currently     Comment: on occasion     No Known Allergies      Current Outpatient Medications:     glucose blood test strip, Use 1 each 4 (four) times a day, Disp: 120 each, Rfl: 9    insulin aspart (NovoLOG FlexPen) 100 UNIT/ML injection pen, With each meal - Inject 1 unit for 5 grams of carbs + 1 unit for every 25 mg/dl above 150mg/dL  Max dose of 60 units per day , Disp: 60 mL, Rfl: 1    insulin detemir (Levemir FlexTouch) 100 Units/mL injection pen, Use 25u q AM and 50u q PM daily, Disp: 15 mL, Rfl: 5    Insulin Pen Needle (B-D ULTRAFINE III SHORT PEN) 31G X 8 MM MISC, Inject under the skin daily, Disp: 100 each, Rfl: 2    Multiple Vitamins-Minerals (MULTIVITAMIN ADULT PO), Take by mouth, Disp: , Rfl:     Omega-3 Fatty Acids (fish oil) 1,000 mg, Take 1,000 mg by mouth daily, Disp: , Rfl:     atorvastatin (LIPITOR) 20 mg tablet, TAKE 1 TABLET BY MOUTH EVERY DAY (Patient not taking: Reported on 3/31/2022), Disp: 90 tablet, Rfl: 0    Continuous Blood Gluc  (FreeStyle Demond 14 Day [place]) POLA, Use 1 Device 4 (four) times a day as needed (blood glucose monitoring) (Patient not taking: Reported on 7/15/2021), Disp: 1 Device, Rfl: 0    Continuous Blood Gluc Sensor (FreeStyle Demond 14 Day Sensor) MISC, Use 1 application every 14 (fourteen) days (Patient not taking: Reported on 7/15/2021), Disp: 4 each, Rfl: 2    VITAMIN D PO, Take 1,000 Units by mouth daily (Patient not taking: Reported on 11/17/2021 ), Disp: , Rfl:     Review of Systems   Constitutional: Positive for activity change, appetite change and fatigue  HENT: Negative  Eyes: Negative  Respiratory: Negative  Cardiovascular: Negative for chest pain  Gastrointestinal: Negative  Endocrine: Negative  Genitourinary: Negative  Musculoskeletal: Negative  Skin: Negative  Allergic/Immunologic: Negative  Neurological: Negative  Hematological: Negative  Psychiatric/Behavioral: Negative  All other systems reviewed and are negative  Physical Exam:  Body mass index is 36 03 kg/m²  /100   Pulse 89   Ht 5' 11 75" (1 822 m)   Wt 120 kg (263 lb 12 8 oz)   BMI 36 03 kg/m²    Vitals:    03/31/22 0904   Weight: 120 kg (263 lb 12 8 oz)        Physical Exam  Constitutional:       Appearance: He is well-developed     HENT:      Head: Normocephalic  Eyes:      Pupils: Pupils are equal, round, and reactive to light  Neck:      Thyroid: No thyromegaly  Cardiovascular:      Rate and Rhythm: Normal rate  Heart sounds: Normal heart sounds  Pulmonary:      Effort: Pulmonary effort is normal       Breath sounds: Normal breath sounds  Abdominal:      General: Bowel sounds are normal       Palpations: Abdomen is soft  Musculoskeletal:         General: No deformity  Cervical back: Normal range of motion  Skin:     Capillary Refill: Capillary refill takes less than 2 seconds  Coloration: Skin is not pale  Findings: No rash  Neurological:      Mental Status: He is alert and oriented to person, place, and time  Labs:   Lab Results   Component Value Date    HGBA1C 9 9 (A) 03/31/2022       Lab Results   Component Value Date    [id number] 1 310 06/16/2020       Lab Results   Component Value Date    CREATININE 1 08 03/29/2021    CREATININE 1 00 06/16/2020    BUN 11 03/29/2021    K 4 0 03/29/2021     03/29/2021    CO2 29 03/29/2021     eGFR   Date Value Ref Range Status   03/29/2021 97 ml/min/1 73sq m Final       Lab Results   Component Value Date    ALT 31 03/29/2021    AST 17 03/29/2021    ALKPHOS 79 03/29/2021       Lab Results   Component Value Date    CHOLESTEROL 248 (H) 03/29/2021    CHOLESTEROL 252 (H) 11/30/2020    CHOLESTEROL 270 (H) 06/16/2020     Lab Results   Component Value Date    HDL 40 03/29/2021    HDL 50 11/30/2020    HDL 50 06/16/2020     Lab Results   Component Value Date    TRIG 363 (H) 03/29/2021    TRIG 277 (H) 11/30/2020    TRIG 137 06/16/2020     Lab Results   Component Value Date    [place] 208 03/29/2021    [place] 202 11/30/2020    [place] 220 06/16/2020         Impression:  1  Type 1 diabetes mellitus without complication ([address] )    2  Mixed hyperlipidemia    3  Obesity (BMI 30-39 9)    4  Vitamin D deficiency    5   Hypercholesteremia         Plan:    Diagnoses and all orders for this visit:    Type 1 diabetes mellitus without complication ([facility] )  He remains uncontrolled with A1c 9 9%  Goal is 7%  Poor control is mostly due to dietary indiscretions and increased insulin use leading to weight gain  Reiterated advised to stop AM levemir  Reiterated advise to consider switching to a pump and sensor combination and see diabetes education/nutrition  Referral in chart  Continue Levemir 50u bedtime and Novolog 12u tidac based on glucose  Send glucose logs as needed  Rx sent for dexcom G6 as patient is inclined to use a sensor and qualifies based on MDI insulin therapy  Follow up in 3 months     -     POCT hemoglobin A1c  -     GlycoMark; Future  -     Fructosamine; Future  -     Hemoglobin A1C; Future  -     Hemoglobin A1C; Future  -     Microalbumin / creatinine urine ratio; Future  -     Vitamin D 25 hydroxy; Future  -     Continuous Blood Gluc Transmit (Dexcom G6 Transmitter) MISC; Use 1 Units continuous  -     Continuous Blood Gluc Sensor (Dexcom G6 Sensor) MISC; Use 1 Units every 7 days  -     Continuous Blood Gluc  (Dexcom G6 ) POLA; Use 1 Units continuous    Mixed hyperlipidemia  -     Lipid panel; Future    Obesity (BMI 30-39  9)    Vitamin D deficiency  -     ergocalciferol (VITAMIN D2) 50,000 units; Take 1 capsule (50,000 Units total) by mouth once a week for 20 doses    Hypercholesteremia  -     atorvastatin (LIPITOR) 20 mg tablet; Take 2 tablets (40 mg total) by mouth daily        I have spent 35 minutes with patient today in which greater than 50% of this time was spent in counseling/coordination of care  Discussed with the patient and all questioned fully answered  He will call me if any problems arise  Educated/ Counseled patient on diagnostic test results, prognosis, risk vs benefit of treatment options, importance of treatment compliance, healthy life and lifestyle choices        Jany Jansen

## 2022-04-01 ENCOUNTER — TELEPHONE (OUTPATIENT)
Dept: ENDOCRINOLOGY | Facility: CLINIC | Age: 42
End: 2022-04-01

## 2022-04-01 ENCOUNTER — LAB (OUTPATIENT)
Dept: LAB | Facility: CLINIC | Age: 42
End: 2022-04-01
Payer: COMMERCIAL

## 2022-04-01 DIAGNOSIS — E10.9 TYPE 1 DIABETES MELLITUS WITHOUT COMPLICATION (HCC): ICD-10-CM

## 2022-04-01 DIAGNOSIS — E10.65 TYPE 1 DIABETES MELLITUS WITH HYPERGLYCEMIA (HCC): ICD-10-CM

## 2022-04-01 DIAGNOSIS — E78.2 MIXED HYPERLIPIDEMIA: ICD-10-CM

## 2022-04-01 LAB
25(OH)D3 SERPL-MCNC: 13.8 NG/ML (ref 30–100)
CHOLEST SERPL-MCNC: 260 MG/DL
CREAT UR-MCNC: 139 MG/DL
EST. AVERAGE GLUCOSE BLD GHB EST-MCNC: 243 MG/DL
HBA1C MFR BLD: 10.1 %
HDLC SERPL-MCNC: 35 MG/DL
MICROALBUMIN UR-MCNC: 29.6 MG/L (ref 0–20)
MICROALBUMIN/CREAT 24H UR: 21 MG/G CREATININE (ref 0–30)
NONHDLC SERPL-MCNC: 225 MG/DL
TRIGL SERPL-MCNC: 555 MG/DL

## 2022-04-01 PROCEDURE — 82985 ASSAY OF GLYCATED PROTEIN: CPT

## 2022-04-01 PROCEDURE — 82043 UR ALBUMIN QUANTITATIVE: CPT

## 2022-04-01 PROCEDURE — 82570 ASSAY OF URINE CREATININE: CPT

## 2022-04-01 PROCEDURE — 80061 LIPID PANEL: CPT

## 2022-04-01 PROCEDURE — 83036 HEMOGLOBIN GLYCOSYLATED A1C: CPT

## 2022-04-01 PROCEDURE — 36415 COLL VENOUS BLD VENIPUNCTURE: CPT

## 2022-04-01 PROCEDURE — 3046F HEMOGLOBIN A1C LEVEL >9.0%: CPT | Performed by: FAMILY MEDICINE

## 2022-04-01 PROCEDURE — 84378 SUGARS SINGLE QUANT: CPT

## 2022-04-01 PROCEDURE — 82306 VITAMIN D 25 HYDROXY: CPT

## 2022-04-01 NOTE — TELEPHONE ENCOUNTER
Prior authorization for [employer] , sensors and transmitter were done via [employer] my meds  Rober Whyte ([place]: [place]) - [id number]    Need help? Call us at [phone]    Status   Sent to [facility]  Next Steps   The plan will fax you a determination, typically within 1 to 5 business days  How do I follow up?

## 2022-04-02 LAB — FRUCTOSAMINE SERPL-SCNC: 471 UMOL/L (ref 0–285)

## 2022-04-04 ENCOUNTER — TELEPHONE (OUTPATIENT)
Dept: ENDOCRINOLOGY | Facility: CLINIC | Age: 42
End: 2022-04-04

## 2022-04-04 NOTE — TELEPHONE ENCOUNTER
----- Message from [address] MD sent at 4/1/2022  5:16 PM EDT -----  A1c, cholesterols high and vit D low as anticipated   Plan as outlined last visit

## 2022-04-05 DIAGNOSIS — E10.65 TYPE 1 DIABETES MELLITUS WITH HYPERGLYCEMIA (HCC): Primary | ICD-10-CM

## 2022-04-05 RX ORDER — FLASH GLUCOSE SCANNING READER
1 EACH MISCELLANEOUS CONTINUOUS
Qty: 1 EACH | Refills: 0 | Status: SHIPPED | OUTPATIENT
Start: 2022-04-05 | End: 2022-04-08

## 2022-04-05 RX ORDER — FLASH GLUCOSE SENSOR
1 KIT MISCELLANEOUS
Qty: 2 EACH | Refills: 0 | Status: SHIPPED | OUTPATIENT
Start: 2022-04-05 | End: 2022-04-08

## 2022-04-07 LAB — 1,5-ANHYDROGLUCITOL SERPL-MCNC: 2 UG/ML

## 2022-04-07 NOTE — TELEPHONE ENCOUNTER
Carlee Munguia ([place]: [place]) - [id number]    Need help? Call us at [phone]    Status   Sent to [address] April 1  Next Steps   The plan will fax you a determination, typically within 1 to 5 business days  How do I follow up?     Drug    Dexcom G6 Sensor   Form    [facility] Oral Form    Prior Authorization for Oral Requests      [phone]  phone      [phone]  fax

## 2022-04-08 DIAGNOSIS — E10.9 TYPE 1 DIABETES MELLITUS WITHOUT COMPLICATION (HCC): Primary | ICD-10-CM

## 2022-04-08 RX ORDER — FLASH GLUCOSE SENSOR
KIT MISCELLANEOUS
Qty: 6 EACH | Refills: 1 | Status: SHIPPED | OUTPATIENT
Start: 2022-04-08

## 2022-04-08 RX ORDER — FLASH GLUCOSE SCANNING READER
EACH MISCELLANEOUS
Qty: 1 EACH | Refills: 0 | Status: SHIPPED | OUTPATIENT
Start: 2022-04-08

## 2022-04-13 NOTE — TELEPHONE ENCOUNTER
Called [facility] portillo, I did reopen the P A case awaiting for results

## 2022-04-14 ENCOUNTER — OFFICE VISIT (OUTPATIENT)
Dept: INTERNAL MEDICINE CLINIC | Facility: CLINIC | Age: 42
End: 2022-04-14
Payer: COMMERCIAL

## 2022-04-14 VITALS
HEIGHT: 71 IN | SYSTOLIC BLOOD PRESSURE: 128 MMHG | HEART RATE: 97 BPM | TEMPERATURE: 97.5 F | WEIGHT: 256 LBS | BODY MASS INDEX: 35.84 KG/M2 | DIASTOLIC BLOOD PRESSURE: 84 MMHG | RESPIRATION RATE: 18 BRPM | OXYGEN SATURATION: 97 %

## 2022-04-14 DIAGNOSIS — E10.9 TYPE 1 DIABETES MELLITUS WITHOUT COMPLICATION (HCC): ICD-10-CM

## 2022-04-14 DIAGNOSIS — M79.642 PAIN OF LEFT HAND: Primary | ICD-10-CM

## 2022-04-14 PROCEDURE — 99213 OFFICE O/P EST LOW 20 MIN: CPT

## 2022-04-14 PROCEDURE — 92250 FUNDUS PHOTOGRAPHY W/I&R: CPT

## 2022-04-14 RX ORDER — ATORVASTATIN CALCIUM 40 MG/1
40 TABLET, FILM COATED ORAL DAILY
COMMUNITY
Start: 2022-03-31

## 2022-04-14 NOTE — PROGRESS NOTES
[facility]    NAME: Rober Whyte  AGE: 38 y o  SEX: male  : 1980     DATE: 2022     Assessment and Plan:     Problem List Items Addressed This Visit        Endocrine    Diabetes mellitus type 1 ([facility] )    Relevant Orders    IRIS Diabetic eye exam      Other Visit Diagnoses     Pain of left hand    -  Primary    X-ray left hand  Referral to Orthopedic surgery  No known injury or trauma  Patient states chronic greater than 1 year    Relevant Orders    XR hand 3+ vw left    Ambulatory Referral to Orthopedic Surgery              No follow-ups on file  Chief Complaint:     Chief Complaint   Patient presents with    Follow-up     check up, cant full bend little finger limited range of motion        History of Present Illness:     Dee Junior presents to the office today for routine follow-up and to establish care with this provider  He had been seeing a provider who is no longer with this practice  He follows with endocrinology for his type 1 diabetes  He recently had appointment and blood work with them  He is managing his insulin further recommendations  His only complaint today is pain at the base of his 5th digit and decreased range of motion of the 5th digit  He states the somewhat chronic  He does remember any specific injury to that hand however he states that he believes the range of motion is now less than it has been  Review of Systems:     Review of Systems   Constitutional: Negative  HENT: Negative  Respiratory: Negative  Cardiovascular: Negative  Gastrointestinal: Negative  Genitourinary: Negative  Musculoskeletal: Positive for arthralgias (Left 5th digit)  Skin: Negative  Neurological: Negative  Psychiatric/Behavioral: Negative  Problem List:     Patient Active Problem List   Diagnosis    Diabetes mellitus type 1 ([facility] )    Mixed hyperlipidemia    Obesity (BMI 30-39  9)    Vitamin D deficiency Objective:     /84 (BP Location: Left arm, Patient Position: Sitting, Cuff Size: Standard)   Pulse 97   Temp 97 5 °F (36 4 °C) (Temporal)   Resp 18   Ht 5' 11" (1 803 m)   Wt 116 kg (256 lb)   SpO2 97%   BMI 35 70 kg/m²     Physical Exam  Vitals and nursing note reviewed  Constitutional:       Appearance: He is well-developed  He is obese  HENT:      Head: Normocephalic and atraumatic  Right Ear: External ear normal       Left Ear: External ear normal       Nose: Nose normal       Mouth/Throat:      Mouth: Mucous membranes are moist       Pharynx: Oropharynx is clear  Eyes:      Conjunctiva/sclera: Conjunctivae normal    Cardiovascular:      Rate and Rhythm: Normal rate and regular rhythm  Heart sounds: No murmur heard  Pulmonary:      Effort: Pulmonary effort is normal  No respiratory distress  Breath sounds: Normal breath sounds  Abdominal:      Palpations: Abdomen is soft  Tenderness: There is no abdominal tenderness  Musculoskeletal:         General: Normal range of motion  Left hand: Tenderness (Base of 5th digit on the palmar aspect) present  Decreased range of motion: 5th digit  Cervical back: Neck supple  Skin:     General: Skin is warm and dry  Capillary Refill: Capillary refill takes less than 2 seconds  Neurological:      Mental Status: He is alert and oriented to person, place, and time  Psychiatric:         Mood and Affect: Mood normal          Behavior: Behavior normal          Thought Content: Thought content normal          Judgment: Judgment normal          I spent 15 minutes with this patient      [address] [place] [address]  [facility]

## 2022-04-15 LAB
LEFT EYE DIABETIC RETINOPATHY: NORMAL
LEFT EYE IMAGE QUALITY: NORMAL
LEFT EYE MACULAR EDEMA: NORMAL
LEFT EYE OTHER RETINOPATHY: NORMAL
RIGHT EYE DIABETIC RETINOPATHY: NORMAL
RIGHT EYE IMAGE QUALITY: NORMAL
RIGHT EYE MACULAR EDEMA: NORMAL
RIGHT EYE OTHER RETINOPATHY: NORMAL
SEVERITY (EYE EXAM): NORMAL

## 2022-04-15 PROCEDURE — 2025F 7 FLD RTA PHOTO W/O RTNOPTHY: CPT | Performed by: FAMILY MEDICINE

## 2022-04-21 ENCOUNTER — OFFICE VISIT (OUTPATIENT)
Dept: OBGYN CLINIC | Facility: CLINIC | Age: 42
End: 2022-04-21
Payer: COMMERCIAL

## 2022-04-21 VITALS
WEIGHT: 260 LBS | HEART RATE: 96 BPM | HEIGHT: 71 IN | SYSTOLIC BLOOD PRESSURE: 124 MMHG | DIASTOLIC BLOOD PRESSURE: 89 MMHG | BODY MASS INDEX: 36.4 KG/M2

## 2022-04-21 DIAGNOSIS — M65.352 TRIGGER FINGER, LEFT LITTLE FINGER: Primary | ICD-10-CM

## 2022-04-21 PROCEDURE — 99243 OFF/OP CNSLTJ NEW/EST LOW 30: CPT | Performed by: FAMILY MEDICINE

## 2022-04-21 PROCEDURE — 3074F SYST BP LT 130 MM HG: CPT | Performed by: FAMILY MEDICINE

## 2022-04-21 PROCEDURE — 3079F DIAST BP 80-89 MM HG: CPT | Performed by: FAMILY MEDICINE

## 2022-04-21 PROCEDURE — 20550 NJX 1 TENDON SHEATH/LIGAMENT: CPT | Performed by: FAMILY MEDICINE

## 2022-04-21 RX ORDER — LIDOCAINE HYDROCHLORIDE 10 MG/ML
0.5 INJECTION, SOLUTION INFILTRATION; PERINEURAL
Status: COMPLETED | OUTPATIENT
Start: 2022-04-21 | End: 2022-04-21

## 2022-04-21 RX ADMIN — LIDOCAINE HYDROCHLORIDE 0.5 ML: 10 INJECTION, SOLUTION INFILTRATION; PERINEURAL at 16:27

## 2022-04-21 NOTE — LETTER
April 21, 2022     [address] [place] [address], [address] [place] [address]    Patient: Anahi Edwards   YOB: 1980   Date of Visit: 4/21/2022       Dear Dr Walton Laisha: Thank you for referring Anahi Edwards to me for evaluation  Below are my notes for this consultation  If you have questions, please do not hesitate to call me  I look forward to following your patient along with you  Sincerely,        Suzie Gr DO        CC: No Recipients  Suzie Gr DO  4/21/2022  4:29 PM  Sign when Signing Visit  Assessment/Plan:  Assessment/Plan   Diagnoses and all orders for this visit:    Trigger finger, left little finger  -     Ambulatory Referral to Orthopedic Surgery  -     Ambulatory Referral to PT/OT Hand Therapy; Future  -     Hand/upper extremity injection: L small A3     38year-old right-dominant male with left little finger pain and locking more than 1 year duration  Discussed with patient physical exam, impression and plan  Physical exam left hand noted for tenderness of the little finger A1 pulley  There is palpable flexor tendon nodule  There is limited range of motion with flexion at the PIP joint  There is reproduction of locking and snapping with flexion and extension  There is no appreciable collateral ligament laxity  He is intact neurovascularly  Clinical impression is that he is symptomatic from trigger finger  I discussed treatment regimen of injection, supplements, and topical anti-inflammatory  Most recent A1c is 10 1 so we will defer oral steroid or corticosteroid injection due to adverse reaction of elevation of blood sugar caused by steroids  Administered mixture of 0 5 cc 1% lidocaine and 0 5 cc Toradol to left little finger A1 pulley without complication  He is to take tumeric at least 1000 mg daily and tart cherry at least 1000 mg daily  He is to apply topical diclofenac gel 3 to 4 times a day for the next 10 days    I will refer him to hand therapy which he is to start as soon as possible and do home exercises as directed  He will follow up as needed  He was advised he may receive up to 2 steroid injections at the same trigger site prior to surgery being recommended  Subjective:   Patient ID: Jessica Chong is a 38 y o  male  Chief Complaint   Patient presents with    Left Hand - Pain       40-year-old right-hand-dominant male presents for evaluation of left little finger pain and locking more than 1 year duration  He denies any particular trauma or inciting event  Pain described as gradual in onset localized to the base of the finger over the A1 pulley, nonradiating, achy and throbbing and sometimes sharp, worse with gripping activities, associated locking after flexion and snapping with forced extension  He sometimes has locking of the finger has to use the other hand to passively extend the digit which is associated with sharp pain  Symptoms started off as mild intensity, however have been progressively worsening over  He was seen by primary care provider and referred to orthopedic care  Hand Pain  This is a chronic problem  The current episode started more than 1 year ago  The problem occurs daily  The problem has been gradually worsening  Associated symptoms include arthralgias, joint swelling and weakness  Pertinent negatives include no abdominal pain, chest pain, chills, fever, numbness, rash or sore throat  Exacerbated by: Gripping  He has tried rest and position changes for the symptoms  The treatment provided mild relief  The following portions of the patient's history were reviewed and updated as appropriate: He  has a past medical history of Diabetes mellitus type 1 ([facility] [address] )  He  has a past surgical history that includes No past surgeries  His family history includes Diabetes in his father and mother  He  reports that he has never smoked   He has never used smokeless tobacco  He reports previous alcohol use  He reports that he does not use drugs  He has No Known Allergies       Review of Systems   Constitutional: Negative for chills and fever  HENT: Negative for sore throat  Eyes: Negative for visual disturbance  Respiratory: Negative for shortness of breath  Cardiovascular: Negative for chest pain  Gastrointestinal: Negative for abdominal pain  Genitourinary: Negative for flank pain  Musculoskeletal: Positive for arthralgias and joint swelling  Skin: Negative for rash and wound  Neurological: Positive for weakness  Negative for numbness  Hematological: Does not bruise/bleed easily  Psychiatric/Behavioral: Negative for self-injury  Objective:  Vitals:    04/21/22 1342   BP: 124/89   Pulse: 96   Weight: 118 kg (260 lb)   Height: 5' 11" (1 803 m)     Left Hand Exam     Muscle Strength   Wrist extension: 5/5   Wrist flexion: 5/5   :  4/5     Other   Sensation: normal  Pulse: present    Comments:  Left little finger  - tenderness  A1 pulley  There is palpable flexor tendon nodule  There is limited range of motion with flexion at the PIP joint  There is reproduction of locking and snapping with flexion and extension  There is no appreciable collateral ligament laxity  Strength/Myotome Testing     Left Wrist/Hand   Wrist extension: 5  Wrist flexion: 5        Physical Exam  Vitals and nursing note reviewed  Constitutional:       General: He is not in acute distress  Appearance: He is well-developed  He is not ill-appearing or diaphoretic  HENT:      Head: Normocephalic and atraumatic  Right Ear: External ear normal       Left Ear: External ear normal    Eyes:      Conjunctiva/sclera: Conjunctivae normal    Neck:      Trachea: No tracheal deviation  Cardiovascular:      Rate and Rhythm: Normal rate  Pulmonary:      Effort: Pulmonary effort is normal  No respiratory distress  Abdominal:      General: There is no distension     Musculoskeletal: General: Tenderness present  No swelling, deformity or signs of injury  Skin:     General: Skin is warm and dry  Coloration: Skin is not jaundiced or pale  Neurological:      Mental Status: He is alert and oriented to person, place, and time  Psychiatric:         Mood and Affect: Mood normal          Behavior: Behavior normal          Thought Content: Thought content normal          Judgment: Judgment normal          Hand/upper extremity injection: L small A1  Universal Protocol:  Consent: Verbal consent obtained  Risks and benefits: risks, benefits and alternatives were discussed  Consent given by: patient  Time out: Immediately prior to procedure a "time out" was called to verify the correct patient, procedure, equipment, support staff and site/side marked as required  Patient understanding: patient states understanding of the procedure being performed  Patient consent: the patient's understanding of the procedure matches consent given  Procedure consent: procedure consent matches procedure scheduled  Relevant documents: relevant documents present and verified  Test results: test results available and properly labeled  Site marked: the operative site was marked  Radiology Images displayed and confirmed   If images not available, report reviewed: imaging studies available  Required items: required blood products, implants, devices, and special equipment available  Patient identity confirmed: verbally with patient    Supporting Documentation  Indications: tendon swelling and pain   Procedure Details  Condition:trigger finger Location: small finger - L small A1   Preparation: Patient was prepped and draped in the usual sterile fashion  Needle size: 27 G  Approach: volar  Medications administered: 0 5 mL lidocaine 1 % (0 5 mL Ketorolac 30mg/mL, [address] 12112-946-42, LOT [id number], EXP 06/2023)    Patient tolerance: patient tolerated the procedure well with no immediate complications  Dressing:  Sterile dressing applied

## 2022-04-21 NOTE — PROGRESS NOTES
Assessment/Plan:  Assessment/Plan   Diagnoses and all orders for this visit:    Trigger finger, left little finger  -     Ambulatory Referral to Orthopedic Surgery  -     Ambulatory Referral to PT/OT Hand Therapy; Future  -     Hand/upper extremity injection: L small A3     38year-old right-dominant male with left little finger pain and locking more than 1 year duration  Discussed with patient physical exam, impression and plan  Physical exam left hand noted for tenderness of the little finger A1 pulley  There is palpable flexor tendon nodule  There is limited range of motion with flexion at the PIP joint  There is reproduction of locking and snapping with flexion and extension  There is no appreciable collateral ligament laxity  He is intact neurovascularly  Clinical impression is that he is symptomatic from trigger finger  I discussed treatment regimen of injection, supplements, and topical anti-inflammatory  Most recent A1c is 10 1 so we will defer oral steroid or corticosteroid injection due to adverse reaction of elevation of blood sugar caused by steroids  Administered mixture of 0 5 cc 1% lidocaine and 0 5 cc Toradol to left little finger A1 pulley without complication  He is to take tumeric at least 1000 mg daily and tart cherry at least 1000 mg daily  He is to apply topical diclofenac gel 3 to 4 times a day for the next 10 days  I will refer him to hand therapy which he is to start as soon as possible and do home exercises as directed  He will follow up as needed  He was advised he may receive up to 2 steroid injections at the same trigger site prior to surgery being recommended  Subjective:   Patient ID: Russel Arroyo is a 38 y o  male  Chief Complaint   Patient presents with    Left Hand - Pain       68-year-old right-hand-dominant male presents for evaluation of left little finger pain and locking more than 1 year duration  He denies any particular trauma or inciting event  Pain described as gradual in onset localized to the base of the finger over the A1 pulley, nonradiating, achy and throbbing and sometimes sharp, worse with gripping activities, associated locking after flexion and snapping with forced extension  He sometimes has locking of the finger has to use the other hand to passively extend the digit which is associated with sharp pain  Symptoms started off as mild intensity, however have been progressively worsening over  He was seen by primary care provider and referred to orthopedic care  Hand Pain  This is a chronic problem  The current episode started more than 1 year ago  The problem occurs daily  The problem has been gradually worsening  Associated symptoms include arthralgias, joint swelling and weakness  Pertinent negatives include no abdominal pain, chest pain, chills, fever, numbness, rash or sore throat  Exacerbated by: Gripping  He has tried rest and position changes for the symptoms  The treatment provided mild relief  The following portions of the patient's history were reviewed and updated as appropriate: He  has a past medical history of Diabetes mellitus type 1 ([address] )  He  has a past surgical history that includes No past surgeries  His family history includes Diabetes in his father and mother  He  reports that he has never smoked  He has never used smokeless tobacco  He reports previous alcohol use  He reports that he does not use drugs  He has No Known Allergies       Review of Systems   Constitutional: Negative for chills and fever  HENT: Negative for sore throat  Eyes: Negative for visual disturbance  Respiratory: Negative for shortness of breath  Cardiovascular: Negative for chest pain  Gastrointestinal: Negative for abdominal pain  Genitourinary: Negative for flank pain  Musculoskeletal: Positive for arthralgias and joint swelling  Skin: Negative for rash and wound  Neurological: Positive for weakness   Negative for numbness  Hematological: Does not bruise/bleed easily  Psychiatric/Behavioral: Negative for self-injury  Objective:  Vitals:    04/21/22 1342   BP: 124/89   Pulse: 96   Weight: 118 kg (260 lb)   Height: 5' 11" (1 803 m)     Left Hand Exam     Muscle Strength   Wrist extension: 5/5   Wrist flexion: 5/5   :  4/5     Other   Sensation: normal  Pulse: present    Comments:  Left little finger  - tenderness  A1 pulley  There is palpable flexor tendon nodule  There is limited range of motion with flexion at the PIP joint  There is reproduction of locking and snapping with flexion and extension  There is no appreciable collateral ligament laxity  Strength/Myotome Testing     Left Wrist/Hand   Wrist extension: 5  Wrist flexion: 5        Physical Exam  Vitals and nursing note reviewed  Constitutional:       General: He is not in acute distress  Appearance: He is well-developed  He is not ill-appearing or diaphoretic  HENT:      Head: Normocephalic and atraumatic  Right Ear: External ear normal       Left Ear: External ear normal    Eyes:      Conjunctiva/sclera: Conjunctivae normal    Neck:      Trachea: No tracheal deviation  Cardiovascular:      Rate and Rhythm: Normal rate  Pulmonary:      Effort: Pulmonary effort is normal  No respiratory distress  Abdominal:      General: There is no distension  Musculoskeletal:         General: Tenderness present  No swelling, deformity or signs of injury  Skin:     General: Skin is warm and dry  Coloration: Skin is not jaundiced or pale  Neurological:      Mental Status: He is alert and oriented to person, place, and time  Psychiatric:         Mood and Affect: Mood normal          Behavior: Behavior normal          Thought Content: Thought content normal          Judgment: Judgment normal          Hand/upper extremity injection: L small A1  Universal Protocol:  Consent: Verbal consent obtained    Risks and benefits: risks, benefits and alternatives were discussed  Consent given by: patient  Time out: Immediately prior to procedure a "time out" was called to verify the correct patient, procedure, equipment, support staff and site/side marked as required  Patient understanding: patient states understanding of the procedure being performed  Patient consent: the patient's understanding of the procedure matches consent given  Procedure consent: procedure consent matches procedure scheduled  Relevant documents: relevant documents present and verified  Test results: test results available and properly labeled  Site marked: the operative site was marked  Radiology Images displayed and confirmed   If images not available, report reviewed: imaging studies available  Required items: required blood products, implants, devices, and special equipment available  Patient identity confirmed: verbally with patient    Supporting Documentation  Indications: tendon swelling and pain   Procedure Details  Condition:trigger finger Location: small finger - L small A1   Preparation: Patient was prepped and draped in the usual sterile fashion  Needle size: 27 G  Approach: volar  Medications administered: 0 5 mL lidocaine 1 % (0 5 mL Ketorolac 30mg/mL, [address] 91327-280-94, LOT [id number], EXP 06/2023)    Patient tolerance: patient tolerated the procedure well with no immediate complications  Dressing:  Sterile dressing applied

## 2022-05-02 ENCOUNTER — EVALUATION (OUTPATIENT)
Dept: OCCUPATIONAL THERAPY | Age: 42
End: 2022-05-02
Payer: COMMERCIAL

## 2022-05-02 DIAGNOSIS — M65.352 TRIGGER FINGER, LEFT LITTLE FINGER: ICD-10-CM

## 2022-05-02 PROCEDURE — 97760 ORTHOTIC MGMT&TRAING 1ST ENC: CPT

## 2022-05-02 PROCEDURE — 97165 OT EVAL LOW COMPLEX 30 MIN: CPT

## 2022-05-02 PROCEDURE — 97140 MANUAL THERAPY 1/> REGIONS: CPT

## 2022-05-02 NOTE — PROGRESS NOTES
OT Evaluation     Today's date: 2022  Patient name: Ze Rice  : 1980  MRN: [id number]  Referring provider: Concepcion Minor DO  Dx:   Encounter Diagnosis     ICD-10-CM    1  Trigger finger, left little finger  M65 352 Ambulatory Referral to PT/OT Hand Therapy                  Assessment  Assessment details: Beba Jones presents with active clicking of his L SF at the A1 pulley with noted stiffness and pain  He was fabricated an FO to block MCP motion to be used as tolerated  He does report having all over pain and inflammation which he notes went away when he was on a steroid  He was not given a CSI at his last ortho appt secondary to a high A1C  Recommended calling to make an appt with a hand surgeon should his locking continue  He has decreased ROM and strength and he notes he does not use the hand as much as he has in the past  Recommend therapy 2x per week for the next 6 weeks  Impairments: abnormal or restricted ROM, activity intolerance, impaired physical strength, lacks appropriate home exercise program and pain with function    Goals  STG 1: Increase L SF AROM 25% in 4-6 weeks  STG 2: Decrease overall pain to 6/10 in 4-6 weeks  STG 3: Increase overall strength by 25% in 4-6 weeks  STG 4: Compliant with HEP in 2 weeks  LTG 1: Complete all ADL/IADLs improved to prior level of function within 6-8 weeks  LTG 2: Leisure/social skills improved within 6-8 weeks  LTG 3: Work skills improved to prior level of function within 6-8 weeks    Patient Goal: To get full ROM without pain or clicking    Goals, plan of care and treatment condition discussed with patient  Patient expresses their understanding and questions regarding these isues were addressed        Plan  Patient would benefit from: custom splinting, skilled occupational therapy and orthotics  Planned modality interventions: thermotherapy: hydrocollator packs, thermotherapy: paraffin bath and fluidotherapy  Planned therapy interventions: home exercise program, graded exercise, graded activity, therapeutic activities, therapeutic exercise, joint mobilization, manual therapy and Fallon taping  Frequency: 2x week  Duration in weeks: 8  Treatment plan discussed with: patient        Subjective Evaluation    History of Present Illness  Onset date: A Few Months  Pain  Current pain ratin  At best pain ratin  At worst pain ratin  Quality: sharp    Social Support    Employment status: working ()  Hand dominance: ambidextrous          Objective     Observations     Additional Observation Details  Nodule at A1 of L SF with active clicking       Active Range of Motion     Left Digits    Flexion   Little     MCP: 52    PIP: 62    DIP: 34             Precautions: Universal      Manuals 5/2            IASTM 8'            Ortho Fit and Train 10'                                      Neuro Re-Ed                                                                                                        Ther Ex             Hook Fist Hop                                                                                           HEP: April Trejo Fist             Ther Activity                                       Gait Training                                       Modalities             [facility]             Paraffin

## 2022-05-10 DIAGNOSIS — E10.9 TYPE 1 DIABETES MELLITUS WITHOUT COMPLICATION (HCC): ICD-10-CM

## 2022-05-10 RX ORDER — PEN NEEDLE, DIABETIC 31 GX5/16"
NEEDLE, DISPOSABLE MISCELLANEOUS 4 TIMES DAILY
Qty: 400 EACH | Refills: 1 | Status: SHIPPED | OUTPATIENT
Start: 2022-05-10

## 2022-05-13 ENCOUNTER — OFFICE VISIT (OUTPATIENT)
Dept: OCCUPATIONAL THERAPY | Age: 42
End: 2022-05-13
Payer: COMMERCIAL

## 2022-05-13 DIAGNOSIS — M65.352 TRIGGER FINGER, LEFT LITTLE FINGER: Primary | ICD-10-CM

## 2022-05-13 PROCEDURE — 97110 THERAPEUTIC EXERCISES: CPT

## 2022-05-13 PROCEDURE — 97140 MANUAL THERAPY 1/> REGIONS: CPT

## 2022-05-13 NOTE — PROGRESS NOTES
Daily Note     Today's date: 2022  Patient name: Austin Cortez  : 1980  MRN: [id number]  Referring provider: Pily Sequeira DO  Dx:   Encounter Diagnosis     ICD-10-CM    1  Trigger finger, left little finger  M65 352                   Subjective: I can bend it now, but it's just still stiff      Objective: See treatment diary below      Assessment: Tolerated treatment well  Patient would benefit from continued OT  No active clicking or locking noted today  Crepitus noted with IASTM however he reports relief post  Wearing the splint as much as he can tolerate  Plan: Continue per plan of care        Precautions: Universal      Manuals            IASTM 8' 15'           Ortho Fit and Train 10'                                      Neuro Re-Ed                                                                                                        Ther Ex             Hook Fist Hop  40x           PIP Isometric  5x hold 5 sec                                                                            HEP: FO, Hook Fist             Ther Activity                                       Gait Training                                       Modalities             [facility]             Paraffin

## 2022-05-16 ENCOUNTER — OFFICE VISIT (OUTPATIENT)
Dept: OCCUPATIONAL THERAPY | Age: 42
End: 2022-05-16
Payer: COMMERCIAL

## 2022-05-16 DIAGNOSIS — M65.352 TRIGGER FINGER, LEFT LITTLE FINGER: Primary | ICD-10-CM

## 2022-05-16 PROCEDURE — 97140 MANUAL THERAPY 1/> REGIONS: CPT

## 2022-05-16 PROCEDURE — 97110 THERAPEUTIC EXERCISES: CPT

## 2022-05-16 NOTE — PROGRESS NOTES
Daily Note     Today's date: 2022  Patient name: Rufus Matthew  : 1980  MRN: [id number]  Referring provider: Sandy Hernandez DO  Dx:   Encounter Diagnosis     ICD-10-CM    1  Trigger finger, left little finger  M65 352                   Subjective: I can bend it now, but it's just still stiff      Objective: See treatment diary below      Assessment: Tolerated treatment well  Patient would benefit from continued OT  Continued crepitus noted today with IASTM, he reports that it feels irritated today, trialed KT instead of the splint to decrease possible irritation from the splint rubbing  Continue as tolerated    Plan: Continue per plan of care        Precautions: Universal      Manuals           IASTM 8' 15' 15'          Ortho Fit and Train 10'                                      Neuro Re-Ed                                                                                                        Ther Ex             Hook Fist Hop  40x 40x          PIP Isometric  5x hold 5 sec 5x hold 5 sec                                                                           HEP: FO, Hook Fist             Ther Activity                                       Gait Training                                       Modalities             P             Paraffin

## 2022-05-19 ENCOUNTER — APPOINTMENT (OUTPATIENT)
Dept: OCCUPATIONAL THERAPY | Age: 42
End: 2022-05-19
Payer: COMMERCIAL

## 2022-05-20 ENCOUNTER — OFFICE VISIT (OUTPATIENT)
Dept: OCCUPATIONAL THERAPY | Age: 42
End: 2022-05-20
Payer: COMMERCIAL

## 2022-05-20 DIAGNOSIS — M65.352 TRIGGER FINGER, LEFT LITTLE FINGER: Primary | ICD-10-CM

## 2022-05-20 PROCEDURE — 97140 MANUAL THERAPY 1/> REGIONS: CPT

## 2022-05-20 NOTE — PROGRESS NOTES
Daily Note     Today's date: 2022  Patient name: Bria Schilling  : 1980  MRN: [id number]  Referring provider: Andre Raza DO  Dx:   Encounter Diagnosis     ICD-10-CM    1  Trigger finger, left little finger  M65 352                   Subjective: It just keeps getting bigger      Objective: See treatment diary below  15 min late      Assessment: Tolerated treatment well  Patient would benefit from continued OT  Decreased locking post manuals    Plan: Continue per plan of care        Precautions: Universal      Manuals          IASTM 8' 15' 15' 15'         Ortho Fit and Train 10'                                      Neuro Re-Ed                                                                                                        Ther Ex             Hook Fist Hop  40x 40x          PIP Isometric  5x hold 5 sec 5x hold 5 sec                                                                           HEP: FO, Hook Fist             Ther Activity                                       Gait Training                                       Modalities             [facility]             Paraffin

## 2022-05-23 ENCOUNTER — OFFICE VISIT (OUTPATIENT)
Dept: OCCUPATIONAL THERAPY | Age: 42
End: 2022-05-23
Payer: COMMERCIAL

## 2022-05-23 DIAGNOSIS — M65.352 TRIGGER FINGER, LEFT LITTLE FINGER: Primary | ICD-10-CM

## 2022-05-23 PROCEDURE — 97140 MANUAL THERAPY 1/> REGIONS: CPT

## 2022-05-23 PROCEDURE — 97110 THERAPEUTIC EXERCISES: CPT

## 2022-05-23 NOTE — PROGRESS NOTES
Daily Note     Today's date: 2022  Patient name: Duy Neely  : 1980  MRN: [id number]  Referring provider: Lanie Lucas DO  Dx:   Encounter Diagnosis     ICD-10-CM    1  Trigger finger, left little finger  M65 352                   Subjective: At times it's good, then at times it's bad      Objective: See treatment diary below      Assessment: Tolerated treatment well  Patient would benefit from continued OT  Decreased crepitus post manuals, refabricated MCP blocking splint  Plan: Continue per plan of care        Precautions: Universal      Manuals         IASTM 8' 15' 15' 15' 15'        Ortho Fit and Train 10'                                      Neuro Re-Ed                                                                                                        Ther Ex             Hook Fist Hop  40x 40x  40x        PIP Isometric  5x hold 5 sec 5x hold 5 sec  5x hold 5 sec                                                                         HEP: FO, Hook Fist             Ther Activity                                       Gait Training                                       Modalities             P             Paraffin

## 2022-05-26 ENCOUNTER — OFFICE VISIT (OUTPATIENT)
Dept: OCCUPATIONAL THERAPY | Age: 42
End: 2022-05-26
Payer: COMMERCIAL

## 2022-05-26 DIAGNOSIS — M65.352 TRIGGER FINGER, LEFT LITTLE FINGER: Primary | ICD-10-CM

## 2022-05-26 PROCEDURE — 97140 MANUAL THERAPY 1/> REGIONS: CPT

## 2022-05-26 NOTE — PROGRESS NOTES
Daily Note     Today's date: 2022  Patient name: Claudell Dux  : 1980  MRN: [id number]  Referring provider: Tonya Hernandez DO  Dx:   Encounter Diagnosis     ICD-10-CM    1  Trigger finger, left little finger  M65 352                   Subjective: At times it's good, then at times it's bad      Objective: See treatment diary below      Assessment: Tolerated treatment well  Patient would benefit from continued OT  Decreased crepitus post manuals, greatly improved since last visit    Plan: Continue per plan of care        Precautions: Universal      Manuals        IASTM 8' 15' 15' 15' 15' 15'       Ortho Fit and Train 10'                                      Neuro Re-Ed                                                                                                        Ther Ex             Hook Fist Hop  40x 40x  40x        PIP Isometric  5x hold 5 sec 5x hold 5 sec  5x hold 5 sec 5x hold 5 sec                                                                        HEP: FO, Hook Fist             Ther Activity                                       Gait Training                                       Modalities             P             Paraffin

## 2022-06-01 ENCOUNTER — APPOINTMENT (OUTPATIENT)
Dept: OCCUPATIONAL THERAPY | Age: 42
End: 2022-06-01
Payer: COMMERCIAL

## 2022-06-03 ENCOUNTER — OFFICE VISIT (OUTPATIENT)
Dept: OCCUPATIONAL THERAPY | Age: 42
End: 2022-06-03
Payer: COMMERCIAL

## 2022-06-03 DIAGNOSIS — M65.352 TRIGGER FINGER, LEFT LITTLE FINGER: Primary | ICD-10-CM

## 2022-06-03 PROCEDURE — 97140 MANUAL THERAPY 1/> REGIONS: CPT

## 2022-06-03 PROCEDURE — 97110 THERAPEUTIC EXERCISES: CPT

## 2022-06-03 NOTE — PROGRESS NOTES
Daily Note     Today's date: 6/3/2022  Patient name: Rufus Matthew  : 1980  MRN: [id number]  Referring provider: Sandy Hernandez DO  Dx:   Encounter Diagnosis     ICD-10-CM    1  Trigger finger, left little finger  M65 352                   Subjective: I do think it's getting better      Objective: See treatment diary below      Assessment: Tolerated treatment well  Patient would benefit from continued OT  Continued decrease in crepitus, continue as tolerated  Plan: Continue per plan of care        Precautions: Universal      Manuals 5/2 5/13 5/16 5/20 5/23 5/26 6/3      IASTM 8' 15' 15' 15' 15' 15' 15'      Ortho Fit and Train 10'                                      Neuro Re-Ed                                                                                                        Ther Ex             Hook Fist Hop  40x 40x  40x        PIP Isometric  5x hold 5 sec 5x hold 5 sec  5x hold 5 sec 5x hold 5 sec 5x hold 5 sec                                                                       HEP: FO, Hook Fist             Ther Activity                                       Gait Training                                       Modalities             [facility]             Paraffin

## 2022-06-06 ENCOUNTER — OFFICE VISIT (OUTPATIENT)
Dept: OCCUPATIONAL THERAPY | Age: 42
End: 2022-06-06
Payer: COMMERCIAL

## 2022-06-06 DIAGNOSIS — M65.352 TRIGGER FINGER, LEFT LITTLE FINGER: Primary | ICD-10-CM

## 2022-06-06 PROCEDURE — 97110 THERAPEUTIC EXERCISES: CPT

## 2022-06-06 PROCEDURE — 97140 MANUAL THERAPY 1/> REGIONS: CPT

## 2022-06-06 NOTE — PROGRESS NOTES
Daily Note     Today's date: 2022  Patient name: Anahi Edwards  : 1980  MRN: [id number]  Referring provider: Ed Area, DO  Dx:   Encounter Diagnosis     ICD-10-CM    1  Trigger finger, left little finger  M65 352                   Subjective: "the lump gets smaller after therapy "      Objective: See treatment diary below      Assessment: Tolerated treatment well  Patient would benefit from continued OT  Pt noted with moderate crepitus at start of tx- mild at end of tx  Pt reports feeling afraid to make a fist  Pt unable to make composite fist at start of tx- able 100% at end  continue as tolerated  Plan: Continue per plan of care        Precautions: Universal      Manuals 5/2 5/13 5/16 5/20 5/23 5/26 6/3 6/6     IASTM 8' 15' 15' 15' 15' 15' 15' 15'     Ortho Fit and Train 10'                                      Neuro Re-Ed                                                                                                        Ther Ex             Hook Fist Hop  40x 40x  40x        PIP Isometric  5x hold 5 sec 5x hold 5 sec  5x hold 5 sec 5x hold 5 sec 5x hold 5 sec 5x hold 5 sec     Finger extension stretch        10x hold 5sec                                                         HEP: FO, Hook Fist             Ther Activity                                       Gait Training                                       Modalities             MHP        5; before tx     Paraffin

## 2022-06-09 ENCOUNTER — APPOINTMENT (OUTPATIENT)
Dept: OCCUPATIONAL THERAPY | Age: 42
End: 2022-06-09
Payer: COMMERCIAL

## 2022-06-10 ENCOUNTER — OFFICE VISIT (OUTPATIENT)
Dept: OCCUPATIONAL THERAPY | Age: 42
End: 2022-06-10
Payer: COMMERCIAL

## 2022-06-10 DIAGNOSIS — E10.9 TYPE 1 DIABETES MELLITUS WITHOUT COMPLICATION (HCC): ICD-10-CM

## 2022-06-10 DIAGNOSIS — M65.352 TRIGGER FINGER, LEFT LITTLE FINGER: Primary | ICD-10-CM

## 2022-06-10 PROCEDURE — 97140 MANUAL THERAPY 1/> REGIONS: CPT

## 2022-06-10 RX ORDER — INSULIN DETEMIR 100 [IU]/ML
INJECTION, SOLUTION SUBCUTANEOUS
Qty: 30 ML | Refills: 3 | Status: SHIPPED | OUTPATIENT
Start: 2022-06-10

## 2022-06-10 NOTE — PROGRESS NOTES
Daily Note     Today's date: 6/10/2022  Patient name: Vitaliy Plata  : 1980  MRN: [id number]  Referring provider: Emanuel Bamberger, DO  Dx:   Encounter Diagnosis     ICD-10-CM    1  Trigger finger, left little finger  M65 352                   Subjective: "It isn't any worse"      Objective: See treatment diary below      Assessment: Tolerated treatment well  Patient would benefit from continued OT  Mild crepitus around nodule noted, decreased clicking noted at end of treatment     Plan: Continue per plan of care        Precautions: Universal      Manuals 5/2 5/13 5/16 5/20 5/23 5/26 6/3 6/6 6/10    IASTM 8' 15' 15' 15' 15' 15' 15' 15' 10'    Ortho Fit and Train 10'                                      Neuro Re-Ed                                                                                                        Ther Ex             Hook Fist Hop  40x 40x  40x        PIP Isometric  5x hold 5 sec 5x hold 5 sec  5x hold 5 sec 5x hold 5 sec 5x hold 5 sec 5x hold 5 sec 5x hold 5 sec    Finger extension stretch        10x hold 5sec                                                         HEP: FO, Hook Fist             Ther Activity                                       Gait Training                                       Modalities             MHP        5; before tx     Paraffin

## 2022-06-13 ENCOUNTER — OFFICE VISIT (OUTPATIENT)
Dept: OCCUPATIONAL THERAPY | Age: 42
End: 2022-06-13
Payer: COMMERCIAL

## 2022-06-13 DIAGNOSIS — M65.352 TRIGGER FINGER, LEFT LITTLE FINGER: Primary | ICD-10-CM

## 2022-06-13 PROCEDURE — 97140 MANUAL THERAPY 1/> REGIONS: CPT

## 2022-06-13 NOTE — PROGRESS NOTES
Daily Note     Today's date: 2022  Patient name: Jessica Chong  : 1980  MRN: [id number]  Referring provider: Gisela Laws DO  Dx:   Encounter Diagnosis     ICD-10-CM    1  Trigger finger, left little finger  M65 352                   Subjective: "I think it is better"      Objective: See treatment diary below      Assessment: Tolerated treatment well  Patient would benefit from continued OT  Mild crepitus around nodule noted, decreased clicking noted at end of treatment plan to begin intrinsic stretching NV  Plan: Continue per plan of care        Precautions: Universal      Manuals 5/2 5/13 5/16 5/20 5/23 5/26 6/3 6/6 6/10 6/13   IASTM 8' 15' 15' 15' 15' 15' 15' 15' 10' 10'   Ortho Fit and Train 10'                                      Neuro Re-Ed                                                                                                        Ther Ex             Hook Fist Hop  40x 40x  40x        PIP Isometric  5x hold 5 sec 5x hold 5 sec  5x hold 5 sec 5x hold 5 sec 5x hold 5 sec 5x hold 5 sec 5x hold 5 sec 5x hold 5 sec   Finger extension stretch        10x hold 5sec                                                         HEP: FO, Hook Fist             Ther Activity                                       Gait Training                                       Modalities             MHP        5; before tx     Paraffin

## 2022-06-16 ENCOUNTER — OFFICE VISIT (OUTPATIENT)
Dept: OCCUPATIONAL THERAPY | Age: 42
End: 2022-06-16
Payer: COMMERCIAL

## 2022-06-16 DIAGNOSIS — M65.352 TRIGGER FINGER, LEFT LITTLE FINGER: Primary | ICD-10-CM

## 2022-06-16 PROCEDURE — 97110 THERAPEUTIC EXERCISES: CPT

## 2022-06-16 PROCEDURE — 97140 MANUAL THERAPY 1/> REGIONS: CPT

## 2022-06-16 NOTE — PROGRESS NOTES
Daily Note     Today's date: 2022  Patient name: Betty Su  : 1980  MRN: [id number]  Referring provider: Tess Seth DO  Dx:   Encounter Diagnosis     ICD-10-CM    1  Trigger finger, left little finger  M65 352                   Subjective: "I think it is better"      Objective: See treatment diary below      Assessment: Tolerated treatment well  Patient would benefit from continued OT  Added active hook roll with good tolerance  Greatly decreased clicking, no active locking noted  Plan: Continue per plan of care        Precautions: Universal      Manuals 6/16 5/13 5/16 5/20 5/23 5/26 6/3 6/6 6/10 6/13   IASTM 10' 15' 15' 15' 15' 15' 15' 15' 10' 10'   Ortho Fit and Train                                       Neuro Re-Ed                                                                                                        Ther Ex             Hook Fist Hop 40x 40x 40x  40x        PIP Isometric  5x hold 5 sec 5x hold 5 sec  5x hold 5 sec 5x hold 5 sec 5x hold 5 sec 5x hold 5 sec 5x hold 5 sec 5x hold 5 sec   Finger extension stretch        10x hold 5sec     Hook Roll 10x            Hook Fist into Putty 15x into yellow putty                                      HEP: FO, Hook Fist             Ther Activity                                       Gait Training                                       Modalities             MHP        5; before tx     Paraffin

## 2022-06-20 DIAGNOSIS — E55.9 VITAMIN D DEFICIENCY: ICD-10-CM

## 2022-06-21 ENCOUNTER — OFFICE VISIT (OUTPATIENT)
Dept: OCCUPATIONAL THERAPY | Age: 42
End: 2022-06-21
Payer: COMMERCIAL

## 2022-06-21 DIAGNOSIS — M65.352 TRIGGER FINGER, LEFT LITTLE FINGER: Primary | ICD-10-CM

## 2022-06-21 PROCEDURE — 97140 MANUAL THERAPY 1/> REGIONS: CPT

## 2022-06-21 PROCEDURE — 97110 THERAPEUTIC EXERCISES: CPT

## 2022-06-21 RX ORDER — ERGOCALCIFEROL 1.25 MG/1
50000 CAPSULE ORAL WEEKLY
Qty: 20 CAPSULE | Refills: 0 | Status: SHIPPED | OUTPATIENT
Start: 2022-06-21 | End: 2022-11-02

## 2022-06-21 NOTE — PROGRESS NOTES
Daily Note     Today's date: 2022  Patient name: Anahi Edwards  : 1980  MRN: [id number]  Referring provider: Ed Area, DO  Dx:   Encounter Diagnosis     ICD-10-CM    1  Trigger finger, left little finger  M65 352                   Subjective: "I didn't wear the splint on  and it was clicking  Objective: See treatment diary below      Assessment: Tolerated treatment well  Patient would benefit from continued OT  Active clicking with MCP flexion noted today  Continue as tolerated  Plan: Continue per plan of care        Precautions: Universal      Manuals 6/16 6/21 5/16 5/20 5/23 5/26 6/3 6/6 6/10 6/13   IASTM 10' 10' 15' 15' 15' 15' 15' 15' 10' 10'   Ortho Fit and Train                                       Neuro Re-Ed                                                                                                        Ther Ex             Hook Fist Hop 40x  40x  40x        PIP Isometric   5x hold 5 sec  5x hold 5 sec 5x hold 5 sec 5x hold 5 sec 5x hold 5 sec 5x hold 5 sec 5x hold 5 sec   Finger extension stretch        10x hold 5sec     Hook Roll 10x 10x           Hook Fist into Putty 15x into yellow putty 15x into red putty                                     HEP: FO, Hook Fist             Ther Activity                                       Gait Training                                       Modalities             MHP        5; before tx     Paraffin

## 2022-06-21 NOTE — TELEPHONE ENCOUNTER
Faxed request to Dr. Surinder Buchanan at [facility]. F: [phone]    Need ok per cardiology to hold plavix x 5 days prior to EGD. Fax confirmation received 06/21/2022 at 3:58 pm.     Plan to await clearance per cards to hold blood thinner x 5 days. Scheduled for EGD with Dr. Randy Lauren 07/28/2022. Please sent to [facility]

## 2022-06-24 ENCOUNTER — OFFICE VISIT (OUTPATIENT)
Dept: OCCUPATIONAL THERAPY | Age: 42
End: 2022-06-24
Payer: COMMERCIAL

## 2022-06-24 DIAGNOSIS — M65.352 TRIGGER FINGER, LEFT LITTLE FINGER: Primary | ICD-10-CM

## 2022-06-24 PROCEDURE — 97140 MANUAL THERAPY 1/> REGIONS: CPT

## 2022-06-24 NOTE — PROGRESS NOTES
Daily Note     Today's date: 2022  Patient name: Teo Crenshaw  : 1980  MRN: [id number]  Referring provider: Fina Linda DO  Dx:   Encounter Diagnosis     ICD-10-CM    1  Trigger finger, left little finger  M65 352                   Subjective: "It was great until yesterday!"      Objective: See treatment diary below      Assessment: Tolerated treatment well  Patient would benefit from continued OT  Decreased clicking since last visit, nodule continues to be tender  Plan: Continue per plan of care        Precautions: Universal      Manuals 6/16 6/21 6/24 5/20 5/23 5/26 6/3 6/6 6/10 6/13   IASTM 10' 10' 10' 15' 15' 15' 15' 15' 10' 10'   Ortho Fit and Train                                       Neuro Re-Ed                                                                                                        Ther Ex             Hook Fist Hop 40x    40x        PIP Isometric     5x hold 5 sec 5x hold 5 sec 5x hold 5 sec 5x hold 5 sec 5x hold 5 sec 5x hold 5 sec   Finger extension stretch        10x hold 5sec     Hook Roll 10x 10x           Hook Fist into Putty 15x into yellow putty 15x into red putty                                     HEP: FO, Hook Fist             Ther Activity                                       Gait Training                                       Modalities             MHP        5; before tx     Paraffin

## 2022-06-27 ENCOUNTER — OFFICE VISIT (OUTPATIENT)
Dept: OCCUPATIONAL THERAPY | Age: 42
End: 2022-06-27
Payer: COMMERCIAL

## 2022-06-27 DIAGNOSIS — M65.352 TRIGGER FINGER, LEFT LITTLE FINGER: Primary | ICD-10-CM

## 2022-06-27 PROCEDURE — 97110 THERAPEUTIC EXERCISES: CPT

## 2022-06-27 PROCEDURE — 97140 MANUAL THERAPY 1/> REGIONS: CPT

## 2022-06-27 NOTE — PROGRESS NOTES
Daily Note     Today's date: 2022  Patient name: Gerard Porter  : 1980  MRN: [id number]  Referring provider: Dinora Orozco DO  Dx:   Encounter Diagnosis     ICD-10-CM    1  Trigger finger, left little finger  M65 352                   Subjective: "It was great until yesterday!"      Objective: See treatment diary below      Assessment: Tolerated treatment well  Patient would benefit from continued OT  Decreased clicking since last visit, upgraded to active strengthening with good tolerance  Plan: Continue per plan of care        Precautions: Universal      Manuals 6/16 6/21 6/24 6/27 5/23 5/26 6/3 6/6 6/10 6/13   IASTM 10' 10' 10' 10' 15' 15' 15' 15' 10' 10'   Ortho Fit and Train                                       Neuro Re-Ed                                                                                                        Ther Ex             Hook Fist Hop 40x    40x        PIP Isometric     5x hold 5 sec 5x hold 5 sec 5x hold 5 sec 5x hold 5 sec 5x hold 5 sec 5x hold 5 sec   Finger extension stretch        10x hold 5sec     Hook Roll 10x 10x  10x         Hook Fist into Putty 15x into yellow putty 15x into red putty  15x into red putty         Gross Grasp    GPW x20                      HEP: FO, Hook Fist             Ther Activity                                       Gait Training                                       Modalities             MHP        5; before tx     Paraffin

## 2022-06-28 DIAGNOSIS — E78.2 MIXED HYPERLIPIDEMIA: Primary | ICD-10-CM

## 2022-06-28 RX ORDER — ATORVASTATIN CALCIUM 40 MG/1
40 TABLET, FILM COATED ORAL DAILY
Qty: 90 TABLET | Refills: 1 | Status: SHIPPED | OUTPATIENT
Start: 2022-06-28

## 2022-06-30 ENCOUNTER — OFFICE VISIT (OUTPATIENT)
Dept: OCCUPATIONAL THERAPY | Age: 42
End: 2022-06-30
Payer: COMMERCIAL

## 2022-06-30 DIAGNOSIS — M65.352 TRIGGER FINGER, LEFT LITTLE FINGER: Primary | ICD-10-CM

## 2022-06-30 PROCEDURE — 97110 THERAPEUTIC EXERCISES: CPT

## 2022-06-30 PROCEDURE — 97140 MANUAL THERAPY 1/> REGIONS: CPT

## 2022-06-30 NOTE — PROGRESS NOTES
Daily Note     Today's date: 2022  Patient name: Margie Gregg  : 1980  MRN: [id number]  Referring provider: Yokasta Hamlin DO  Dx:   Encounter Diagnosis     ICD-10-CM    1  Trigger finger, left little finger  M65 352                   Subjective: "It clicked once this morning but it was better!"      Objective: See treatment diary below      Assessment: Tolerated treatment well  Patient would benefit from continued OT  Decreased clicking since last visit, great tolerance to strengthening, plan to upgrade this NV  Plan: Continue per plan of care        Precautions: Universal      Manuals 6/16 6/21 6/24 6/27 6/30 5/26 6/3 6/6 6/10 6/13   IASTM 10' 10' 10' 10' 10' 15' 15' 15' 10' 10'   Ortho Fit and Train                                       Neuro Re-Ed                                                                                                        Ther Ex             Hook Fist Hop 40x            PIP Isometric      5x hold 5 sec 5x hold 5 sec 5x hold 5 sec 5x hold 5 sec 5x hold 5 sec   Finger extension stretch        10x hold 5sec     Hook Roll 10x 10x  10x         Hook Fist into Putty 15x into yellow putty 15x into red putty  15x into red putty x15 into red putty        Gross Grasp    GPW x20 GPW x20                     HEP: FO, Hook Fist             Ther Activity                                       Gait Training                                       Modalities             MHP        5; before tx     Paraffin

## 2022-07-06 ENCOUNTER — OFFICE VISIT (OUTPATIENT)
Dept: OCCUPATIONAL THERAPY | Age: 42
End: 2022-07-06
Payer: COMMERCIAL

## 2022-07-06 DIAGNOSIS — M65.352 TRIGGER FINGER, LEFT LITTLE FINGER: Primary | ICD-10-CM

## 2022-07-06 PROCEDURE — 97110 THERAPEUTIC EXERCISES: CPT

## 2022-07-06 PROCEDURE — 97140 MANUAL THERAPY 1/> REGIONS: CPT

## 2022-07-06 NOTE — PROGRESS NOTES
Daily Note     Today's date: 2022  Patient name: Domingo Rizzo  : 1980  MRN: [id number]  Referring provider: Daisy Kuo DO  Dx:   Encounter Diagnosis     ICD-10-CM    1  Trigger finger, left little finger  M65 352                   Subjective: "It clicked once this morning but it was better!"      Objective: See treatment diary below      Assessment: Tolerated treatment well  Patient would benefit from continued OT  Upgraded with good tolerance  Noted decreased clicking and pain throughout the day with full ROM and without the splint    Plan: Continue per plan of care        Precautions: Universal      Manuals 6/16 6/21 6/24 6/27 6/30 7/6 6/3 6/6 6/10 6/13   IASTM 10' 10' 10' 10' 10' 10' 15' 15' 10' 10'   Ortho Fit and Train                                       Neuro Re-Ed                                                                                                        Ther Ex             Hook Fist Hop 40x            PIP Isometric       5x hold 5 sec 5x hold 5 sec 5x hold 5 sec 5x hold 5 sec   Finger extension stretch        10x hold 5sec     Hook Roll 10x 10x  10x         Hook Fist into Putty 15x into yellow putty 15x into red putty  15x into red putty x15 into red putty x15 into red putty       Gross Grasp    GPW x20 GPW x20 BPW x20                    HEP: FO, Hook Fist             Ther Activity                                       Gait Training                                       Modalities             MHP        5; before tx     Paraffin

## 2022-07-07 ENCOUNTER — OFFICE VISIT (OUTPATIENT)
Dept: ENDOCRINOLOGY | Facility: CLINIC | Age: 42
End: 2022-07-07
Payer: COMMERCIAL

## 2022-07-07 VITALS
SYSTOLIC BLOOD PRESSURE: 124 MMHG | WEIGHT: 258.8 LBS | DIASTOLIC BLOOD PRESSURE: 90 MMHG | HEIGHT: 71 IN | BODY MASS INDEX: 36.23 KG/M2 | HEART RATE: 92 BPM

## 2022-07-07 DIAGNOSIS — E66.9 OBESITY (BMI 30-39.9): ICD-10-CM

## 2022-07-07 DIAGNOSIS — E10.65 TYPE 1 DIABETES MELLITUS WITH HYPERGLYCEMIA (HCC): Primary | ICD-10-CM

## 2022-07-07 DIAGNOSIS — E55.9 VITAMIN D DEFICIENCY: ICD-10-CM

## 2022-07-07 LAB — SL AMB POCT HEMOGLOBIN AIC: 10.8 (ref ?–6.5)

## 2022-07-07 PROCEDURE — 99214 OFFICE O/P EST MOD 30 MIN: CPT | Performed by: INTERNAL MEDICINE

## 2022-07-07 PROCEDURE — 3074F SYST BP LT 130 MM HG: CPT | Performed by: INTERNAL MEDICINE

## 2022-07-07 PROCEDURE — 83036 HEMOGLOBIN GLYCOSYLATED A1C: CPT | Performed by: INTERNAL MEDICINE

## 2022-07-07 PROCEDURE — 3080F DIAST BP >= 90 MM HG: CPT | Performed by: INTERNAL MEDICINE

## 2022-07-07 PROCEDURE — 3046F HEMOGLOBIN A1C LEVEL >9.0%: CPT | Performed by: INTERNAL MEDICINE

## 2022-07-07 PROCEDURE — 95251 CONT GLUC MNTR ANALYSIS I&R: CPT | Performed by: INTERNAL MEDICINE

## 2022-07-07 NOTE — PROGRESS NOTES
Follow-up Patient Progress Note    CC: type 1 diabetes     History of Present Illness:   Otto Altman is a 42 y  o  male with type 1 diabetes since age 3, peripheral neuropathy, microalbuminuria, probable retinopathy, obesity, vit D deficiency and HLD  No history of MI, CVA or intermittent claudication  Last visit was 12/30/2021      Since last visit, he lost 5lbs  A1c 10 8%  He is now a single parent of his 3year old toddler and reports poor sleep  Going to gym regularly over past 2 weeks  Continues to have poor diet and severe hyperglycemia      He was previously on a [employer] insulin pump with a sensor but was unable to tolerate it        CGM data review[de-identified]  Device: milly personal Dates: 6/18/22 - 7/1/22 Usage: 64 % Av glu: 232 mg/dL  SD:  mg/dL CV: 3 6  %  TIR: 26 %  TAR: 72 % TBR: 2  %    Glycemic patters:  Fasting and postprandial hyperglycemia and occasional postprandial hypoglycemia  Significant postprandial severe hyperglycemia  Hypoglycemia: No       Diet: does not carb count presently; admits to indiscretion  Eats 3meals per day and frequent late night snack  Hypoglycemia: No     Current meds:  Levemir 50u daily bedtime and levemir 25u AM  Novolog 12-15 units/meal 7a-11a-3p     Opthamology: waiting  Podiatry: No  Influenza: COVID - Not yet  Dental:No  Pancreatitis: No     Ace/ARB:No  Statin: lipitor 40  Thyroid issues: No    Patient Active Problem List   Diagnosis    Diabetes mellitus type 1 ([facility] )    Mixed hyperlipidemia    Obesity (BMI 30-39  9)    Vitamin D deficiency     Past Medical History:   Diagnosis Date    Diabetes mellitus type 1 ([facility] )       Past Surgical History:   Procedure Laterality Date    NO PAST SURGERIES        Family History   Problem Relation Age of Onset    Diabetes Mother     Diabetes Father      Social History     Tobacco Use    Smoking status: Never Smoker    Smokeless tobacco: Never Used   Substance Use Topics    Alcohol use: Not Currently     Comment: on occasion No Known Allergies      Current Outpatient Medications:     atorvastatin (LIPITOR) 40 mg tablet, Take 40 mg by mouth daily, Disp: , Rfl:     Continuous Blood Gluc  (FreeStyle Demond 2 [place]) POLA, Use daily as directed, Disp: 1 each, Rfl: 0    Continuous Blood Gluc Sensor (FreeStyle Demond 2 Sensor) MISC, Change sensor every 14 days, Disp: 6 each, Rfl: 1    ergocalciferol (VITAMIN D2) 50,000 units, Take 1 capsule (50,000 Units total) by mouth once a week for 20 doses, Disp: 20 capsule, Rfl: 0    glucose blood test strip, Use 1 each 4 (four) times a day, Disp: 120 each, Rfl: 9    insulin aspart (NovoLOG FlexPen) 100 UNIT/ML injection pen, With each meal - Inject 1 unit for 5 grams of carbs + 1 unit for every 25 mg/dl above 150mg/dL  Max dose of 60 units per day , Disp: 60 mL, Rfl: 1    insulin detemir (Levemir FlexTouch) 100 Units/mL injection pen, Use 25u q AM and 50u q PM daily, Disp: 30 mL, Rfl: 3    Insulin Pen Needle (B-D ULTRAFINE III SHORT PEN) 31G X 8 MM MISC, Inject under the skin 4 (four) times a day, Disp: 400 each, Rfl: 1    Multiple Vitamins-Minerals (MULTIVITAMIN ADULT PO), Take by mouth, Disp: , Rfl:     Omega-3 Fatty Acids (fish oil) 1,000 mg, Take 1,000 mg by mouth daily, Disp: , Rfl:     VITAMIN D PO, Take 1,000 Units by mouth daily  , Disp: , Rfl:     atorvastatin (LIPITOR) 40 mg tablet, Take 1 tablet (40 mg total) by mouth daily (Patient not taking: Reported on 7/7/2022), Disp: 90 tablet, Rfl: 1    Review of Systems   Constitutional: Positive for activity change, appetite change and fatigue  HENT: Negative  Eyes: Negative  Respiratory: Negative  Cardiovascular: Negative for chest pain  Gastrointestinal: Negative  Endocrine: Negative  Genitourinary: Negative  Musculoskeletal: Negative  Skin: Negative  Allergic/Immunologic: Negative  Neurological: Negative  Hematological: Negative  Psychiatric/Behavioral: Negative      All other systems reviewed and are negative  Physical Exam:  Body mass index is 36 1 kg/m²  /90   Pulse 92   Ht 5' 11" (1 803 m)   Wt 117 kg (258 lb 12 8 oz)   BMI 36 10 kg/m²    Vitals:    07/07/22 0907   Weight: 117 kg (258 lb 12 8 oz)        Physical Exam  Constitutional:       Appearance: He is well-developed  HENT:      Head: Normocephalic  Eyes:      Pupils: Pupils are equal, round, and reactive to light  Neck:      Thyroid: No thyromegaly  Cardiovascular:      Rate and Rhythm: Normal rate  Heart sounds: Normal heart sounds  Pulmonary:      Effort: Pulmonary effort is normal       Breath sounds: Normal breath sounds  Abdominal:      General: Bowel sounds are normal       Palpations: Abdomen is soft  Musculoskeletal:         General: No deformity  Cervical back: Normal range of motion  Skin:     Capillary Refill: Capillary refill takes less than 2 seconds  Coloration: Skin is not pale  Findings: No rash  Neurological:      Mental Status: He is alert and oriented to person, place, and time           Labs:   Lab Results   Component Value Date    HGBA1C 10 8 (A) 07/07/2022       Lab Results   Component Value Date    [id number] 1 310 06/16/2020       Lab Results   Component Value Date    CREATININE 1 08 03/29/2021    CREATININE 1 00 06/16/2020    BUN 11 03/29/2021    K 4 0 03/29/2021     03/29/2021    CO2 29 03/29/2021     eGFR   Date Value Ref Range Status   03/29/2021 97 ml/min/1 73sq m Final       Lab Results   Component Value Date    ALT 31 03/29/2021    AST 17 03/29/2021    ALKPHOS 79 03/29/2021       Lab Results   Component Value Date    CHOLESTEROL 260 (H) 04/01/2022    CHOLESTEROL 248 (H) 03/29/2021    CHOLESTEROL 252 (H) 11/30/2020     Lab Results   Component Value Date    HDL 35 (L) 04/01/2022    HDL 40 03/29/2021    HDL 50 11/30/2020     Lab Results   Component Value Date    TRIG 555 (H) 04/01/2022    TRIG 363 (H) 03/29/2021    TRIG 277 (H) 11/30/2020     Lab Results   Component Value Date    [place] 225 04/01/2022    [address] 208 03/29/2021    [address] 202 11/30/2020         Impression:  1  Type 1 diabetes mellitus with hyperglycemia (HCC)    2  Obesity (BMI 30-39 9)    3  Vitamin D deficiency         Plan:    Diagnoses and all orders for this visit:    Type 1 diabetes mellitus with hyperglycemia ([address] )  He is uncontrolled with A1c 10 8%  goal is 7%  Barriers to his care is his work and social situation leading to poor diet choices  Today we discussed milly findings  He feels that continuous data does help him make different dietary choices but also causes some stress  Advised eventual use of a sensor and pump combination should help relieve him of some of his manual diabetes management specially with using a algorithm driven control [employer]/smartSensiotec system  Advised to prepare with diabetes education for pump  Past dexcom sensor was denied by insurance  Advised to continue current dose insulin and try to reduce carb per meal  Avoid 4th meal of the day if possible and substitute with a protein snack  Follow up in 3 months     -     POCT hemoglobin A1c  -     Ambulatory referral to Diabetic Education; Future    Obesity (BMI 30-39  9)    Vitamin D deficiency        I have spent 35 minutes with patient today in which greater than 50% of this time was spent in counseling/coordination of care  Discussed with the patient and all questioned fully answered  He will call me if any problems arise  Educated/ Counseled patient on diagnostic test results, prognosis, risk vs benefit of treatment options, importance of treatment compliance, healthy life and lifestyle choices        Rashaad Paulson

## 2022-07-07 NOTE — PROGRESS NOTES
Daily Note     Today's date: 2022  Patient name: Domingo Rizzo  : 1980  MRN: [id number]  Referring provider: Daisy Kuo DO  Dx:   Encounter Diagnosis     ICD-10-CM    1  Trigger finger, left little finger  M65 352                   Subjective: It's not too bad      Objective: See treatment diary below      Assessment: Tolerated treatment well  Patient would benefit from continued OT      Plan: Continue per plan of care        Precautions: Universal      Manuals 6/16 6/21 6/24 6/27 6/30 7/6 7/8 6/6 6/10 6/13   IASTM 10' 10' 10' 10' 10' 10' 10' 15' 10' 10'   Ortho [employer]                                       Neuro Re-Ed                                                                                                        Ther Ex             Hook Fist Hop 40x            PIP Isometric        5x hold 5 sec 5x hold 5 sec 5x hold 5 sec   Finger extension stretch        10x hold 5sec     Hook Roll 10x 10x  10x         Hook Fist into Putty 15x into yellow putty 15x into red putty  15x into red putty x15 into red putty x15 into red putty       Gross Grasp    GPW x20 GPW x20 BPW x20 BPW x30      EDC       Y Gummy x20      HEP: FO, Hook Fist             Ther Activity             Pinch Pins       R/R 2x                   Gait Training                                       Modalities             MHP        5; before tx     Paraffin

## 2022-07-08 ENCOUNTER — OFFICE VISIT (OUTPATIENT)
Dept: OCCUPATIONAL THERAPY | Age: 42
End: 2022-07-08
Payer: COMMERCIAL

## 2022-07-08 DIAGNOSIS — M65.352 TRIGGER FINGER, LEFT LITTLE FINGER: Primary | ICD-10-CM

## 2022-07-08 PROCEDURE — 97110 THERAPEUTIC EXERCISES: CPT

## 2022-07-08 PROCEDURE — 97140 MANUAL THERAPY 1/> REGIONS: CPT

## 2022-07-08 PROCEDURE — 97530 THERAPEUTIC ACTIVITIES: CPT

## 2022-07-11 ENCOUNTER — OFFICE VISIT (OUTPATIENT)
Dept: OCCUPATIONAL THERAPY | Age: 42
End: 2022-07-11
Payer: COMMERCIAL

## 2022-07-11 DIAGNOSIS — M65.352 TRIGGER FINGER, LEFT LITTLE FINGER: Primary | ICD-10-CM

## 2022-07-11 PROCEDURE — 97110 THERAPEUTIC EXERCISES: CPT

## 2022-07-11 PROCEDURE — 97140 MANUAL THERAPY 1/> REGIONS: CPT

## 2022-07-11 NOTE — PROGRESS NOTES
Daily Note     Today's date: 2022  Patient name: Quinton Watkins  : 1980  MRN: [id number]  Referring provider: Ady Alvarez DO  Dx:   Encounter Diagnosis     ICD-10-CM    1  Trigger finger, left little finger  M65 352                   Subjective: Offers no new complaints      Objective: See treatment diary below      Assessment: Tolerated treatment well  Patient would benefit from continued OT Upgrade to full active strengthening next week  Plan: Continue per plan of care        Precautions: Universal       Manuals 6/16 6/21 6/24 6/27 6/30 7/6 7/8 7/11 6/10 6/13   IASTM 10' 10' 10' 10' 10' 10' 10' 10' 10' 10'   Ortho [employer] and [employer]                                       Neuro Re-Ed                                                                                                        Ther Ex             Hook Fist Hop 40x            PIP Isometric         5x hold 5 sec 5x hold 5 sec   Finger extension stretch             Hook Roll 10x 10x  10x         Hook Fist into Putty 15x into yellow putty 15x into red putty  15x into red putty x15 into red putty x15 into red putty       Gross Grasp    GPW x20 GPW x20 BPW x20 BPW x30 BPW x30     EDC       Y Gummy x20 Y Gummy x20     HEP: FO, Hook Fist             Ther Activity             Pinch Pins       R/R 2x R/R 2x                  Gait Training                                       Modalities             MHP             Paraffin

## 2022-07-19 ENCOUNTER — OFFICE VISIT (OUTPATIENT)
Dept: OCCUPATIONAL THERAPY | Age: 42
End: 2022-07-19
Payer: COMMERCIAL

## 2022-07-19 DIAGNOSIS — M65.352 TRIGGER FINGER, LEFT LITTLE FINGER: Primary | ICD-10-CM

## 2022-07-19 PROCEDURE — 97140 MANUAL THERAPY 1/> REGIONS: CPT

## 2022-07-19 PROCEDURE — 97110 THERAPEUTIC EXERCISES: CPT

## 2022-07-19 NOTE — PROGRESS NOTES
Daily Note     Today's date: 2022  Patient name: Roxy Watts  : 1980  MRN: [id number]  Referring provider: Linus Ortiz DO  Dx:   Encounter Diagnosis     ICD-10-CM    1  Trigger finger, left little finger  M65 352                   Subjective: I lost my splint but it's fine      Objective: See treatment diary below      Assessment: Tolerated treatment well  Patient would benefit from continued OT Doing well, wean from splint with no clicking or locking, only stiffness noted    Plan: Continue per plan of care        Precautions: Universal       Manuals    IASTM 10' 10' 10' 10' 10' 10' 10' 10' 10' 10'   Ortho Fit and Train                                       Neuro Re-Ed                                                                                                        Ther Ex             Hook Fist Hop 40x            PIP Isometric          5x hold 5 sec   Finger extension stretch             Hook Roll 10x 10x  10x         Hook Fist into Putty 15x into yellow putty 15x into red putty  15x into red putty x15 into red putty x15 into red putty       Gross Grasp    GPW x20 GPW x20 BPW x20 BPW x30 BPW x30 BPW x30    EDC       Y Gummy x20 Y Gummy x20 R Gummy x20    HEP: FO, Hook Fist             Ther Activity             Pinch Pins       R/R 2x R/R 2x R/G 2x                 Gait Training                                       Modalities             MHP             Paraffin

## 2022-07-22 ENCOUNTER — OFFICE VISIT (OUTPATIENT)
Dept: OCCUPATIONAL THERAPY | Age: 42
End: 2022-07-22
Payer: COMMERCIAL

## 2022-07-22 DIAGNOSIS — M65.352 TRIGGER FINGER, LEFT LITTLE FINGER: Primary | ICD-10-CM

## 2022-07-22 PROCEDURE — 97140 MANUAL THERAPY 1/> REGIONS: CPT

## 2022-07-22 NOTE — PROGRESS NOTES
Daily Note     Today's date: 2022  Patient name: Preston Gonzalezr  : 1980  MRN: [id number]  Referring provider: Landen Livingston DO  Dx:   Encounter Diagnosis     ICD-10-CM    1  Trigger finger, left little finger  M65 352                   Subjective: It's been locking      Objective: See treatment diary below      Assessment: Tolerated treatment well  Patient would benefit from continued OT Locking and increased clicking noted today  Otto reports high blood sugars all week which may be contributing to his worsening symptoms  We will RE NV, if symptoms continue to get worse, recommend he call orthopedics for the next step  Plan: Continue per plan of care        Precautions: Universal       Manuals    IASTM 10' 10' 10' 10' 10' 10' 10' 10' 10' 10'   Ortho Fit and Train                                       Neuro Re-Ed                                                                                                        Ther Ex             Hook Fist Hop 40x            PIP Isometric             Finger extension stretch             Hook Roll 10x 10x  10x         Hook Fist into Putty 15x into yellow putty 15x into red putty  15x into red putty x15 into red putty x15 into red putty       Gross Grasp    GPW x20 GPW x20 BPW x20 BPW x30 BPW x30 BPW x30 BPW x30   EDC       Y Gummy x20 Y Gummy x20 R Gummy x20 R Gummy x20   HEP: FO, Hook Fist             Ther Activity             Pinch Pins       R/R 2x R/R 2x R/G 2x R/G 2x                Gait Training                                       Modalities             [facility]             Paraffin

## 2022-07-25 ENCOUNTER — OFFICE VISIT (OUTPATIENT)
Dept: OCCUPATIONAL THERAPY | Age: 42
End: 2022-07-25
Payer: COMMERCIAL

## 2022-07-25 DIAGNOSIS — M65.352 TRIGGER FINGER, LEFT LITTLE FINGER: Primary | ICD-10-CM

## 2022-07-25 PROCEDURE — 97140 MANUAL THERAPY 1/> REGIONS: CPT

## 2022-07-25 PROCEDURE — 97110 THERAPEUTIC EXERCISES: CPT

## 2022-07-25 NOTE — PROGRESS NOTES
Daily Note     Today's date: 2022  Patient name: Leobardo Méndez  : 1980  MRN: [id number]  Referring provider: Melanie Gamez DO  Dx:   Encounter Diagnosis     ICD-10-CM    1  Trigger finger, left little finger  M65 352                   Subjective: It's been locking      Objective: See treatment diary below      Assessment: Tolerated treatment well  Patient would benefit from continued OT Decreased clicking/locking since last visit  Upgraded today again with good tolerance  Plan: Continue per plan of care        Precautions: Universal       Manuals    IASTM 15' 10' 10' 10' 10' 10' 10' 10' 10' 10'   Ortho Fit and Train                                       Neuro Re-Ed                                                                                                        Ther Ex             Hook Fist Hop             PIP Isometric             Finger extension stretch             Hook Roll  10x  10x         [employer] into Putty  15x into red putty  15x into red putty x15 into red putty x15 into red putty       Gross Grasp    GPW x20 GPW x20 BPW x20 BPW x30 BPW x30 BPW x30 BPW x30   EDC       Y Gummy x20 Y Gummy x20 R Gummy x20 R Gummy x20   HEP: FO, Hook Fist             Ther Activity             Pinch Pins       R/R 2x R/R 2x R/G 2x R/G 2x                Gait Training                                       Modalities             [facility]             Paraffin

## 2022-07-28 ENCOUNTER — OFFICE VISIT (OUTPATIENT)
Dept: OCCUPATIONAL THERAPY | Age: 42
End: 2022-07-28
Payer: COMMERCIAL

## 2022-07-28 DIAGNOSIS — M65.352 TRIGGER FINGER, LEFT LITTLE FINGER: Primary | ICD-10-CM

## 2022-07-28 PROCEDURE — 97530 THERAPEUTIC ACTIVITIES: CPT

## 2022-07-28 PROCEDURE — 97110 THERAPEUTIC EXERCISES: CPT

## 2022-07-28 PROCEDURE — 97140 MANUAL THERAPY 1/> REGIONS: CPT

## 2022-07-28 NOTE — PROGRESS NOTES
Daily Note     Today's date: 2022  Patient name: Yanci Neff  : 1980  MRN: [id number]  Referring provider: Kayy Hoover DO  Dx:   Encounter Diagnosis     ICD-10-CM    1  Trigger finger, left little finger  M65 352                   Subjective: It's a little sore but it is better      Objective: See treatment diary below      Assessment: Tolerated treatment well  Patient would benefit from continued OT Continue to improve with pain and ROM  Plan: Continue per plan of care        Precautions: Universal       Manuals    IASTM 15' 15' 10' 10' 10' 10' 10' 10' 10' 10'   Ortho Fit and Train                                       Neuro Re-Ed                                                                                                        Ther Ex             Hook Fist Hop             PIP Isometric             Finger extension stretch             Hook Roll    10x         [employer] into Putty    15x into red putty x15 into red putty x15 into red putty       Gross Grasp  Black PW x30  GPW x20 GPW x20 BPW x20 BPW x30 BPW x30 BPW x30 BPW x30   EDC  R Gummy x20     Y Gummy x20 Y Gummy x20 R Gummy x20 R Gummy x20   HEP: FO, Hook Fist             Ther Activity             Pinch Pins  G/G 2x     R/R 2x R/R 2x R/G 2x R/G 2x                Gait Training                                       Modalities             MHP             Paraffin

## 2022-08-01 DIAGNOSIS — Z76.0 MEDICATION REFILL: ICD-10-CM

## 2022-08-01 DIAGNOSIS — E10.9 TYPE 1 DIABETES MELLITUS WITHOUT COMPLICATION (HCC): ICD-10-CM

## 2022-08-01 RX ORDER — INSULIN ASPART 100 [IU]/ML
INJECTION, SOLUTION INTRAVENOUS; SUBCUTANEOUS
Qty: 60 ML | Refills: 1 | Status: SHIPPED | OUTPATIENT
Start: 2022-08-01

## 2022-09-13 DIAGNOSIS — E55.9 VITAMIN D DEFICIENCY: ICD-10-CM

## 2022-09-15 DIAGNOSIS — E10.9 TYPE 1 DIABETES MELLITUS WITHOUT COMPLICATION (HCC): ICD-10-CM

## 2022-09-15 RX ORDER — ERGOCALCIFEROL 1.25 MG/1
50000 CAPSULE ORAL WEEKLY
Qty: 12 CAPSULE | Refills: 1 | Status: SHIPPED | OUTPATIENT
Start: 2022-09-15 | End: 2023-01-27

## 2022-09-15 RX ORDER — BLOOD SUGAR DIAGNOSTIC
STRIP MISCELLANEOUS
Qty: 100 STRIP | Refills: 9 | Status: SHIPPED | OUTPATIENT
Start: 2022-09-15

## 2022-09-28 ENCOUNTER — VBI (OUTPATIENT)
Dept: ADMINISTRATIVE | Facility: OTHER | Age: 42
End: 2022-09-28

## 2022-10-04 ENCOUNTER — TELEPHONE (OUTPATIENT)
Dept: INTERNAL MEDICINE CLINIC | Facility: CLINIC | Age: 42
End: 2022-10-04

## 2022-10-04 DIAGNOSIS — E78.2 MIXED HYPERLIPIDEMIA: ICD-10-CM

## 2022-10-04 DIAGNOSIS — E55.9 VITAMIN D DEFICIENCY: ICD-10-CM

## 2022-10-04 DIAGNOSIS — Z13.29 SCREENING FOR THYROID DISORDER: ICD-10-CM

## 2022-10-04 DIAGNOSIS — E10.65 TYPE 1 DIABETES MELLITUS WITH HYPERGLYCEMIA (HCC): ICD-10-CM

## 2022-10-04 DIAGNOSIS — Z11.59 NEED FOR HEPATITIS C SCREENING TEST: Primary | ICD-10-CM

## 2022-10-04 NOTE — TELEPHONE ENCOUNTER
Shahana this pt stated that he is supposed have labs done for his 6 months appt but there are no active orders

## 2022-10-05 NOTE — TELEPHONE ENCOUNTER
I spoke to pt he is aware of his new orders he stated that he might come and get them drawn tomorrow

## 2022-10-06 ENCOUNTER — OFFICE VISIT (OUTPATIENT)
Dept: ENDOCRINOLOGY | Facility: CLINIC | Age: 42
End: 2022-10-06
Payer: COMMERCIAL

## 2022-10-06 ENCOUNTER — APPOINTMENT (OUTPATIENT)
Dept: LAB | Facility: CLINIC | Age: 42
End: 2022-10-06
Payer: COMMERCIAL

## 2022-10-06 VITALS
SYSTOLIC BLOOD PRESSURE: 124 MMHG | HEIGHT: 71 IN | BODY MASS INDEX: 36.65 KG/M2 | HEART RATE: 90 BPM | WEIGHT: 261.8 LBS | DIASTOLIC BLOOD PRESSURE: 92 MMHG

## 2022-10-06 DIAGNOSIS — E55.9 VITAMIN D DEFICIENCY: ICD-10-CM

## 2022-10-06 DIAGNOSIS — Z11.59 NEED FOR HEPATITIS C SCREENING TEST: ICD-10-CM

## 2022-10-06 DIAGNOSIS — E78.2 MIXED HYPERLIPIDEMIA: ICD-10-CM

## 2022-10-06 DIAGNOSIS — Z13.29 SCREENING FOR THYROID DISORDER: ICD-10-CM

## 2022-10-06 DIAGNOSIS — E10.65 TYPE 1 DIABETES MELLITUS WITH HYPERGLYCEMIA (HCC): ICD-10-CM

## 2022-10-06 DIAGNOSIS — E66.9 OBESITY (BMI 30-39.9): ICD-10-CM

## 2022-10-06 DIAGNOSIS — E10.65 TYPE 1 DIABETES MELLITUS WITH HYPERGLYCEMIA (HCC): Primary | ICD-10-CM

## 2022-10-06 LAB
25(OH)D3 SERPL-MCNC: 58.1 NG/ML (ref 30–100)
ALBUMIN SERPL BCP-MCNC: 3.6 G/DL (ref 3.5–5)
ALP SERPL-CCNC: 74 U/L (ref 46–116)
ALT SERPL W P-5'-P-CCNC: 26 U/L (ref 12–78)
ANION GAP SERPL CALCULATED.3IONS-SCNC: 3 MMOL/L (ref 4–13)
AST SERPL W P-5'-P-CCNC: 12 U/L (ref 5–45)
BASOPHILS # BLD AUTO: 0.01 THOUSANDS/ΜL (ref 0–0.1)
BASOPHILS NFR BLD AUTO: 0 % (ref 0–1)
BILIRUB SERPL-MCNC: 0.61 MG/DL (ref 0.2–1)
BUN SERPL-MCNC: 10 MG/DL (ref 5–25)
CALCIUM SERPL-MCNC: 9.1 MG/DL (ref 8.3–10.1)
CHLORIDE SERPL-SCNC: 105 MMOL/L (ref 96–108)
CHOLEST SERPL-MCNC: 160 MG/DL
CO2 SERPL-SCNC: 29 MMOL/L (ref 21–32)
CREAT SERPL-MCNC: 1.07 MG/DL (ref 0.6–1.3)
EOSINOPHIL # BLD AUTO: 0.11 THOUSAND/ΜL (ref 0–0.61)
EOSINOPHIL NFR BLD AUTO: 2 % (ref 0–6)
ERYTHROCYTE [DISTWIDTH] IN BLOOD BY AUTOMATED COUNT: 13.5 % (ref 11.6–15.1)
EST. AVERAGE GLUCOSE BLD GHB EST-MCNC: 260 MG/DL
GFR SERPL CREATININE-BSD FRML MDRD: 83 ML/MIN/1.73SQ M
GLUCOSE P FAST SERPL-MCNC: 171 MG/DL (ref 65–99)
HBA1C MFR BLD: 10.7 %
HCT VFR BLD AUTO: 45.9 % (ref 36.5–49.3)
HCV AB SER QL: NORMAL
HDLC SERPL-MCNC: 46 MG/DL
HGB BLD-MCNC: 14.7 G/DL (ref 12–17)
IMM GRANULOCYTES # BLD AUTO: 0 THOUSAND/UL (ref 0–0.2)
IMM GRANULOCYTES NFR BLD AUTO: 0 % (ref 0–2)
LDLC SERPL CALC-MCNC: 84 MG/DL (ref 0–100)
LYMPHOCYTES # BLD AUTO: 2.45 THOUSANDS/ΜL (ref 0.6–4.47)
LYMPHOCYTES NFR BLD AUTO: 45 % (ref 14–44)
MCH RBC QN AUTO: 27.3 PG (ref 26.8–34.3)
MCHC RBC AUTO-ENTMCNC: 32 G/DL (ref 31.4–37.4)
MCV RBC AUTO: 85 FL (ref 82–98)
MONOCYTES # BLD AUTO: 0.48 THOUSAND/ΜL (ref 0.17–1.22)
MONOCYTES NFR BLD AUTO: 9 % (ref 4–12)
NEUTROPHILS # BLD AUTO: 2.38 THOUSANDS/ΜL (ref 1.85–7.62)
NEUTS SEG NFR BLD AUTO: 44 % (ref 43–75)
NONHDLC SERPL-MCNC: 114 MG/DL
NRBC BLD AUTO-RTO: 0 /100 WBCS
PLATELET # BLD AUTO: 157 THOUSANDS/UL (ref 149–390)
PMV BLD AUTO: 11.9 FL (ref 8.9–12.7)
POTASSIUM SERPL-SCNC: 4.1 MMOL/L (ref 3.5–5.3)
PROT SERPL-MCNC: 7.6 G/DL (ref 6.4–8.4)
RBC # BLD AUTO: 5.38 MILLION/UL (ref 3.88–5.62)
SODIUM SERPL-SCNC: 137 MMOL/L (ref 135–147)
TRIGL SERPL-MCNC: 148 MG/DL
TSH SERPL DL<=0.05 MIU/L-ACNC: 1.33 UIU/ML (ref 0.45–4.5)
WBC # BLD AUTO: 5.43 THOUSAND/UL (ref 4.31–10.16)

## 2022-10-06 PROCEDURE — 86803 HEPATITIS C AB TEST: CPT

## 2022-10-06 PROCEDURE — 82306 VITAMIN D 25 HYDROXY: CPT

## 2022-10-06 PROCEDURE — 80061 LIPID PANEL: CPT

## 2022-10-06 PROCEDURE — 80053 COMPREHEN METABOLIC PANEL: CPT

## 2022-10-06 PROCEDURE — 85025 COMPLETE CBC W/AUTO DIFF WBC: CPT

## 2022-10-06 PROCEDURE — 84443 ASSAY THYROID STIM HORMONE: CPT

## 2022-10-06 PROCEDURE — 36415 COLL VENOUS BLD VENIPUNCTURE: CPT

## 2022-10-06 PROCEDURE — 83036 HEMOGLOBIN GLYCOSYLATED A1C: CPT

## 2022-10-06 PROCEDURE — 99244 OFF/OP CNSLTJ NEW/EST MOD 40: CPT | Performed by: INTERNAL MEDICINE

## 2022-10-06 NOTE — PROGRESS NOTES
Follow-up Patient Progress Note      CC: type 1 diabetes     History of Present Illness:   Otto Altman is a 42 y  o  male with type 1 diabetes since age 7, peripheral neuropathy, microalbuminuria, probable retinopathy, obesity, vit D deficiency and HLD  No history of MI, CVA or intermittent claudication  Last visit was 7/7/2022      Since last visit, he gained 3lbs  A1c 10 8%  He reports conitnues poor control due to personal stressors and inability to find time to manage his diabetes      He was previously on a [employer] insulin pump with a sensor but was unable to tolerate it       CGM data review[de-identified]  Device: milly personal Dates: 9/23/22 - 10/6/22           Usage: 67 %   Av glu: 263 mg/dL                   SD:  mg/dL            CV: 31 1  %  TIR: 18 %   TAR: 82 %       TBR: 0 %     Glycemic patters:  Fasting and postprandial hyperglycemia and occasional postprandial hypoglycemia  Significant postprandial severe hyperglycemia  Hypoglycemia: No        Diet: does not carb count presently; admits to indiscretion  Eats 3 meals per day and frequent late night snack  Hypoglycemia: No     Current meds:  Levemir 50u daily bedtime and levemir 25u AM  Novolog 1u/5g ICR     Opthamology: waiting  Podiatry: No  Influenza: COVID - Not yet  Dental:No  Pancreatitis: No       Patient Active Problem List   Diagnosis    Diabetes mellitus type 1 ([place] [address] )    Mixed hyperlipidemia    Obesity (BMI 30-39  9)    Vitamin D deficiency     Past Medical History:   Diagnosis Date    Diabetes mellitus type 1 ([facility] )       Past Surgical History:   Procedure Laterality Date    NO PAST SURGERIES        Family History   Problem Relation Age of Onset    Diabetes Mother     Diabetes Father      Social History     Tobacco Use    Smoking status: Never Smoker    Smokeless tobacco: Never Used   Substance Use Topics    Alcohol use: Not Currently     Comment: on occasion     No Known Allergies      Current Outpatient Medications:     atorvastatin (LIPITOR) 40 mg tablet, Take 40 mg by mouth daily, Disp: , Rfl:     Continuous Blood Gluc  (FreeStyle Demond 2 [place]) POLA, Use daily as directed, Disp: 1 each, Rfl: 0    Continuous Blood Gluc Sensor (FreeStyle Demond 2 Sensor) MISC, Change sensor every 14 days, Disp: 6 each, Rfl: 1    ergocalciferol (VITAMIN D2) 50,000 units, TAKE 1 CAPSULE (50,000 UNITS TOTAL) BY MOUTH ONCE A WEEK FOR 20 DOSES, Disp: 12 capsule, Rfl: 1    insulin aspart (NovoLOG FlexPen) 100 UNIT/ML injection pen, With each meal - Inject 1 unit for 5 grams of carbs + 1 unit for every 25 mg/dl above 150mg/dL  Max dose of 60 units per day , Disp: 60 mL, Rfl: 1    insulin detemir (Levemir FlexTouch) 100 Units/mL injection pen, Use 25u q AM and 50u q PM daily, Disp: 30 mL, Rfl: 3    Insulin Pen Needle (B-D ULTRAFINE III SHORT PEN) 31G X 8 MM MISC, Inject under the skin 4 (four) times a day, Disp: 400 each, Rfl: 1    Multiple Vitamins-Minerals (MULTIVITAMIN ADULT PO), Take by mouth, Disp: , Rfl:     Omega-3 Fatty Acids (fish oil) 1,000 mg, Take 1,000 mg by mouth daily, Disp: , Rfl:     OneTouch Verio test strip, USE 1 EACH 4 (FOUR) TIMES A DAY, Disp: 100 strip, Rfl: 9    VITAMIN D PO, Take 1,000 Units by mouth daily  , Disp: , Rfl:     atorvastatin (LIPITOR) 40 mg tablet, Take 1 tablet (40 mg total) by mouth daily (Patient not taking: No sig reported), Disp: 90 tablet, Rfl: 1    Review of Systems   Constitutional: Positive for activity change, appetite change and fatigue  HENT: Negative  Eyes: Negative  Respiratory: Negative  Cardiovascular: Negative for chest pain  Gastrointestinal: Negative  Endocrine: Negative  Genitourinary: Negative  Musculoskeletal: Negative  Skin: Negative  Allergic/Immunologic: Negative  Neurological: Negative  Hematological: Negative  Psychiatric/Behavioral: Negative  All other systems reviewed and are negative  Physical Exam:  Body mass index is 36 51 kg/m²    BP 124/92   Pulse 90   Ht 5' 11" (1 803 m)   Wt 119 kg (261 lb 12 8 oz)   BMI 36 51 kg/m²    Vitals:    10/06/22 0852   Weight: 119 kg (261 lb 12 8 oz)        Physical Exam  Constitutional:       Appearance: He is well-developed  HENT:      Head: Normocephalic  Eyes:      Pupils: Pupils are equal, round, and reactive to light  Neck:      Thyroid: No thyromegaly  Cardiovascular:      Rate and Rhythm: Normal rate  Heart sounds: Normal heart sounds  Pulmonary:      Effort: Pulmonary effort is normal       Breath sounds: Normal breath sounds  Abdominal:      General: Bowel sounds are normal       Palpations: Abdomen is soft  Musculoskeletal:         General: No deformity  Cervical back: Normal range of motion  Skin:     Capillary Refill: Capillary refill takes less than 2 seconds  Coloration: Skin is not pale  Findings: No rash  Neurological:      Mental Status: He is alert and oriented to person, place, and time           Labs:   Lab Results   Component Value Date    HGBA1C 10 8 (A) 07/07/2022       Lab Results   Component Value Date    [id number] 1 310 06/16/2020       Lab Results   Component Value Date    CREATININE 1 08 03/29/2021    CREATININE 1 00 06/16/2020    BUN 11 03/29/2021    K 4 0 03/29/2021     03/29/2021    CO2 29 03/29/2021     eGFR   Date Value Ref Range Status   03/29/2021 97 ml/min/1 73sq m Final       Lab Results   Component Value Date    ALT 31 03/29/2021    AST 17 03/29/2021    ALKPHOS 79 03/29/2021       Lab Results   Component Value Date    CHOLESTEROL 260 (H) 04/01/2022    CHOLESTEROL 248 (H) 03/29/2021    CHOLESTEROL 252 (H) 11/30/2020     Lab Results   Component Value Date    HDL 35 (L) 04/01/2022    HDL 40 03/29/2021    HDL 50 11/30/2020     Lab Results   Component Value Date    TRIG 555 (H) 04/01/2022    TRIG 363 (H) 03/29/2021    TRIG 277 (H) 11/30/2020     Lab Results   Component Value Date    NONHDLC 225 04/01/2022    [facility] 208 03/29/2021 [place] 202 11/30/2020         Impression:  1  Type 1 diabetes mellitus with hyperglycemia (HCC)    2  Vitamin D deficiency    3  Obesity (BMI 30-39 9)    4  Mixed hyperlipidemia         Plan:    Diagnoses and all orders for this visit:    Type 1 diabetes mellitus with hyperglycemia ([address] )  He is uncontrolled with A1c 10 8% recently  GMI on AGP was 9 6% with TIR 18%  He has both fasting and postprandial hyperglycemia  Today we discussed options and agreed to continue Levemir 50u qam and 25u pm dose  Advised to add a correction factor 1u/20mg/dL above 120mg/dL to ICR 1u/5gm carbs  Suggested 15min/day schedule time for mindfulness as he seems quite stressed and mentioned loss of further ideas to change things  Send cgm data in 4 weeks  Follow up in 3 months     -     Hemoglobin A1C; Future  -     Microalbumin / creatinine urine ratio; Future  -     Vitamin D 25 hydroxy; Future    Vitamin D deficiency    Obesity (BMI 30-39  9)    Mixed hyperlipidemia        I have spent 35 minutes with patient today in which greater than 50% of this time was spent in counseling/coordination of care  Discussed with the patient and all questioned fully answered  He will call me if any problems arise  Educated/ Counseled patient on diagnostic test results, prognosis, risk vs benefit of treatment options, importance of treatment compliance, healthy life and lifestyle choices        Lauren Jay

## 2022-10-20 ENCOUNTER — OFFICE VISIT (OUTPATIENT)
Dept: INTERNAL MEDICINE CLINIC | Facility: CLINIC | Age: 42
End: 2022-10-20
Payer: COMMERCIAL

## 2022-10-20 VITALS
TEMPERATURE: 97.2 F | HEART RATE: 92 BPM | SYSTOLIC BLOOD PRESSURE: 124 MMHG | BODY MASS INDEX: 36.4 KG/M2 | HEIGHT: 71 IN | RESPIRATION RATE: 20 BRPM | OXYGEN SATURATION: 98 % | DIASTOLIC BLOOD PRESSURE: 76 MMHG | WEIGHT: 260 LBS

## 2022-10-20 DIAGNOSIS — E10.65 TYPE 1 DIABETES MELLITUS WITH HYPERGLYCEMIA (HCC): ICD-10-CM

## 2022-10-20 DIAGNOSIS — E78.2 MIXED HYPERLIPIDEMIA: Primary | ICD-10-CM

## 2022-10-20 PROCEDURE — 99396 PREV VISIT EST AGE 40-64: CPT

## 2022-10-20 NOTE — PATIENT INSTRUCTIONS
Wellness Visit for Adults   AMBULATORY CARE:   A wellness visit  is when you see your healthcare provider to get screened for health problems  Your healthcare provider will also give you advice on how to stay healthy  Write down your questions so you remember to ask them  Ask your healthcare provider how often you should have a wellness visit  What happens at a wellness visit:  Your healthcare provider will ask about your health, and your family history of health problems  This includes high blood pressure, heart disease, and cancer  He or she will ask if you have symptoms that concern you, if you smoke, and about your mood  You may also be asked about your intake of medicines, supplements, food, and alcohol  Any of the following may be done: Your weight  will be checked  Your height may also be checked so your body mass index (BMI) can be calculated  Your BMI shows if you are at a healthy weight  Your blood pressure  and heart rate will be checked  Your temperature may also be checked  Blood and urine tests  may be done  Blood tests may be done to check your cholesterol levels  Abnormal cholesterol levels increase your risk for heart disease and stroke  You may also need a blood or urine test to check for diabetes if you are at increased risk  Urine tests may be done to look for signs of an infection or kidney disease  A physical exam  includes checking your heartbeat and lungs with a stethoscope  Your healthcare provider may also check your skin to look for sun damage  Screening tests  may be recommended  A screening test is done to check for diseases that may not cause symptoms  The screening tests you may need depend on your age, gender, family history, and lifestyle habits  For example, colorectal screening may be recommended if you are 48years old or older  Screening tests you need if you are a woman:   A Pap smear  is used to screen for cervical cancer   Pap smears are usually done every 3 to 5 years depending on your age  You may need them more often if you have had abnormal Pap smear test results in the past  Ask your healthcare provider how often you should have a Pap smear  A mammogram  is an x-ray of your breasts to screen for breast cancer  Experts recommend mammograms every 2 years starting at age 48 years  You may need a mammogram at age 52 years or younger if you have an increased risk for breast cancer  Talk to your healthcare provider about when you should start having mammograms and how often you need them  Vaccines you may need:   Get an influenza vaccine  every year  The influenza vaccine protects you from the flu  Several types of viruses cause the flu  The viruses change over time, so new vaccines are made each year  Get a tetanus-diphtheria (Td) booster vaccine  every 10 years  This vaccine protects you against tetanus and diphtheria  Tetanus is a severe infection that may cause painful muscle spasms and lockjaw  Diphtheria is a severe bacterial infection that causes a thick covering in the back of your mouth and throat  Get a human papillomavirus (HPV) vaccine  if you are female and aged 23 to 32 or male 23 to 24 and never received it  This vaccine protects you from HPV infection  HPV is the most common infection spread by sexual contact  HPV may also cause vaginal, penile, and anal cancers  Get a pneumococcal vaccine  if you are aged 72 years or older  The pneumococcal vaccine is an injection given to protect you from pneumococcal disease  Pneumococcal disease is an infection caused by pneumococcal bacteria  The infection may cause pneumonia, meningitis, or an ear infection  Get a shingles vaccine  if you are 60 or older, even if you have had shingles before  The shingles vaccine is an injection to protect you from the varicella-zoster virus  This is the same virus that causes chickenpox   Shingles is a painful rash that develops in people who had chickenpox or have been exposed to the virus  How to eat healthy:  My Plate is a model for planning healthy meals  It shows the types and amounts of foods that should go on your plate  Fruits and vegetables make up about half of your plate, and grains and protein make up the other half  A serving of dairy is included on the side of your plate  The amount of calories and serving sizes you need depends on your age, gender, weight, and height  Examples of healthy foods are listed below:  Eat a variety of vegetables  such as dark green, red, and orange vegetables  You can also include canned vegetables low in sodium (salt) and frozen vegetables without added butter or sauces  Eat a variety of fresh fruits , canned fruit in 100% juice, frozen fruit, and dried fruit  Include whole grains  At least half of the grains you eat should be whole grains  Examples include whole-wheat bread, wheat pasta, brown rice, and whole-grain cereals such as oatmeal     Eat a variety of protein foods such as seafood (fish and shellfish), lean meat, and poultry without skin (turkey and chicken)  Examples of lean meats include pork leg, shoulder, or tenderloin, and beef round, sirloin, tenderloin, and extra lean ground beef  Other protein foods include eggs and egg substitutes, beans, peas, soy products, nuts, and seeds  Choose low-fat dairy products such as skim or 1% milk or low-fat yogurt, cheese, and cottage cheese  Limit unhealthy fats  such as butter, hard margarine, and shortening  Exercise:  Exercise at least 30 minutes per day on most days of the week  Some examples of exercise include walking, biking, dancing, and swimming  You can also fit in more physical activity by taking the stairs instead of the elevator or parking farther away from stores  Include muscle strengthening activities 2 days each week  Regular exercise provides many health benefits   It helps you manage your weight, and decreases your risk for type 2 diabetes, heart disease, stroke, and high blood pressure  Exercise can also help improve your mood  Ask your healthcare provider about the best exercise plan for you  General health and safety guidelines:   Do not smoke  Nicotine and other chemicals in cigarettes and cigars can cause lung damage  Ask your healthcare provider for information if you currently smoke and need help to quit  E-cigarettes or smokeless tobacco still contain nicotine  Talk to your healthcare provider before you use these products  Limit alcohol  A drink of alcohol is 12 ounces of beer, 5 ounces of wine, or 1½ ounces of liquor  Lose weight, if needed  Being overweight increases your risk of certain health conditions  These include heart disease, high blood pressure, type 2 diabetes, and certain types of cancer  Protect your skin  Do not sunbathe or use tanning beds  Use sunscreen with a SPF 15 or higher  Apply sunscreen at least 15 minutes before you go outside  Reapply sunscreen every 2 hours  Wear protective clothing, hats, and sunglasses when you are outside  Drive safely  Always wear your seatbelt  Make sure everyone in your car wears a seatbelt  A seatbelt can save your life if you are in an accident  Do not use your cell phone when you are driving  This could distract you and cause an accident  Pull over if you need to make a call or send a text message  Practice safe sex  Use latex condoms if are sexually active and have more than one partner  Your healthcare provider may recommend screening tests for sexually transmitted infections (STIs)  Wear helmets, lifejackets, and protective gear  Always wear a helmet when you ride a bike or motorcycle, go skiing, or play sports that could cause a head injury  Wear protective equipment when you play sports  Wear a lifejacket when you are on a boat or doing water sports      © Copyright [employer] 2022 Information is for End User's use only and may not be sold, redistributed or otherwise used for commercial purposes  All illustrations and images included in CareNotes® are the copyrighted property of A D A M , Inc  or Hernesto Salazar  The above information is an  only  It is not intended as medical advice for individual conditions or treatments  Talk to your doctor, nurse or pharmacist before following any medical regimen to see if it is safe and effective for you

## 2022-10-20 NOTE — PROGRESS NOTES
ADULT ANNUAL PHYSICAL   [address]    NAME: Sheree Hopkins  AGE: 38 y o  SEX: male  : 1980     DATE: 10/20/2022     Assessment and Plan:     Problem List Items Addressed This Visit        Endocrine    Diabetes mellitus type 1 ([address] )       Lab Results   Component Value Date    HGBA1C 10 7 (H) 10/06/2022     He is followed by endocrinology  Discussed lifestyle improvements including diet and exercise  Offered patient referral to diabetic educator for him and his wife  Relevant Orders    CBC and differential    Comprehensive metabolic panel    Hemoglobin A1C    TSH, 3rd generation with Free T4 reflex    Lipid panel       Other    Mixed hyperlipidemia - Primary     Lipid panel is much improved  Continue statins  Encouraged lifestyle modifications including diet and increased exercise  Relevant Orders    Lipid panel          Immunizations and preventive care screenings were discussed with patient today  Appropriate education was printed on patient's after visit summary  Counseling:  Alcohol/drug use: discussed moderation in alcohol intake, the recommendations for healthy alcohol use, and avoidance of illicit drug use  Dental Health: discussed importance of regular tooth brushing, flossing, and dental visits  Injury prevention: discussed safety/seat belts, safety helmets, smoke detectors, carbon dioxide detectors, and smoking near bedding or upholstery  Sexual health: discussed sexually transmitted diseases, partner selection, use of condoms, avoidance of unintended pregnancy, and contraceptive alternatives  · Exercise: the importance of regular exercise/physical activity was discussed  Recommend exercise 3-5 times per week for at least 30 minutes  Return in about 6 months (around 2023) for Recheck       Chief Complaint:     Chief Complaint   Patient presents with   • Annual Exam      History of Present Illness: Adult Annual Physical   Patient here for a comprehensive physical exam  The patient reports no problems  Diet and Physical Activity  · Diet/Nutrition: regular non-diabetic  · Exercise: 1-2 times a week on average and 30-60 minutes on average  Depression Screening  PHQ-2/9 Depression Screening    Little interest or pleasure in doing things: 0 - not at all  Feeling down, depressed, or hopeless: 0 - not at all  PHQ-2 Score: 0  PHQ-2 Interpretation: Negative depression screen       General Health  · Sleep: sleeps well  · Hearing: normal - bilateral   · Vision: goes for regular eye exams, most recent eye exam <1 year ago and wears glasses  · Dental: no dental visits for >1 year and brushes teeth twice daily   Health  · Symptoms include: none     Review of Systems:     Review of Systems   Constitutional: Negative  HENT: Negative  Eyes: Negative  Respiratory: Negative  Cardiovascular: Negative  Gastrointestinal: Negative  Endocrine: Negative  Genitourinary: Negative  Musculoskeletal: Positive for arthralgias  Skin: Negative  Neurological: Negative  Psychiatric/Behavioral: Negative         Past Medical History:     Past Medical History:   Diagnosis Date   • Diabetes mellitus type 1 ([facility] )       Past Surgical History:     Past Surgical History:   Procedure Laterality Date   • NO PAST SURGERIES        Family History:     Family History   Problem Relation Age of Onset   • Diabetes Mother    • Diabetes Father       Social History:     Social History     Socioeconomic History   • Marital status: Single     Spouse name: None   • Number of children: None   • Years of education: None   • Highest education level: None   Occupational History   • None   Tobacco Use   • Smoking status: Never Smoker   • Smokeless tobacco: Never Used   Vaping Use   • Vaping Use: Never used   Substance and Sexual Activity   • Alcohol use: Not Currently     Comment: on occasion   • Drug use: Never   • Sexual activity: Yes     Partners: Female   Other Topics Concern   • None   Social History Narrative   • None     Social Determinants of Health     Financial Resource Strain: Not on file   Food Insecurity: Not on file   Transportation Needs: Not on file   Physical Activity: Not on file   Stress: Not on file   Social Connections: Not on file   Intimate Partner Violence: Not on file   Housing Stability: Not on file      Current Medications:     Current Outpatient Medications   Medication Sig Dispense Refill   • atorvastatin (LIPITOR) 40 mg tablet Take 40 mg by mouth daily     • atorvastatin (LIPITOR) 40 mg tablet Take 1 tablet (40 mg total) by mouth daily 90 tablet 1   • ciclopirox (PENLAC) 8 % solution APPLY TO THE AFFECTED AREA(S) TOPICALLY ONCE DAILY PREFERABLY AT BEDTIME OR 8 HOURS BEFORE WASHING     • Continuous Blood Gluc  (FreeStyle [place] 2 [place]) POLA Use daily as directed 1 each 0   • Continuous Blood Gluc Sensor (FreeStyle Demond 2 Sensor) MISC Change sensor every 14 days 6 each 1   • ergocalciferol (VITAMIN D2) 50,000 units TAKE 1 CAPSULE (50,000 UNITS TOTAL) BY MOUTH ONCE A WEEK FOR 20 DOSES 12 capsule 1   • insulin aspart (NovoLOG FlexPen) 100 UNIT/ML injection pen With each meal - Inject 1 unit for 5 grams of carbs + 1 unit for every 25 mg/dl above 150mg/dL  Max dose of 60 units per day  60 mL 1   • insulin detemir (Levemir FlexTouch) 100 Units/mL injection pen Use 25u q AM and 50u q PM daily 30 mL 3   • Insulin Pen Needle (B-D ULTRAFINE III SHORT PEN) 31G X 8 MM MISC Inject under the skin 4 (four) times a day 400 each 1   • Multiple Vitamins-Minerals (MULTIVITAMIN ADULT PO) Take by mouth     • Omega-3 Fatty Acids (fish oil) 1,000 mg Take 1,000 mg by mouth daily     • OneTouch Verio test strip USE 1 EACH 4 (FOUR) TIMES A  strip 9   • VITAMIN D PO Take 1,000 Units by mouth daily         No current facility-administered medications for this visit        Allergies:     No Known Allergies Physical Exam:     /76 (BP Location: Left arm, Patient Position: Sitting, Cuff Size: Large)   Pulse 92   Temp (!) 97 2 °F (36 2 °C) (Tympanic)   Resp 20   Ht 5' 11" (1 803 m)   Wt 118 kg (260 lb)   SpO2 98%   BMI 36 26 kg/m²     Physical Exam  Vitals and nursing note reviewed  Constitutional:       General: He is not in acute distress  Appearance: He is well-developed  He is obese  HENT:      Head: Normocephalic and atraumatic  Right Ear: Tympanic membrane normal       Left Ear: Tympanic membrane normal       Nose: Nose normal  No congestion  Mouth/Throat:      Pharynx: Oropharynx is clear  Eyes:      Conjunctiva/sclera: Conjunctivae normal    Neck:      Thyroid: No thyromegaly  Cardiovascular:      Rate and Rhythm: Normal rate and regular rhythm  Pulses: Pulses are weak  Dorsalis pedis pulses are 1+ on the right side and 1+ on the left side  Heart sounds: No murmur heard  Pulmonary:      Effort: Pulmonary effort is normal  No respiratory distress  Breath sounds: Normal breath sounds  Abdominal:      General: Bowel sounds are normal  There is no distension  Palpations: Abdomen is soft  Tenderness: There is no abdominal tenderness  Musculoskeletal:         General: Normal range of motion  Cervical back: Normal range of motion and neck supple  Feet:    Feet:      Right foot:      Skin integrity: No ulcer, skin breakdown, erythema, warmth, callus or dry skin  Left foot:      Skin integrity: No ulcer, skin breakdown, erythema, warmth, callus or dry skin  Lymphadenopathy:      Cervical: No cervical adenopathy  Skin:     General: Skin is warm and dry  Capillary Refill: Capillary refill takes less than 2 seconds  Neurological:      General: No focal deficit present  Mental Status: He is alert and oriented to person, place, and time     Psychiatric:         Mood and Affect: Mood normal          Behavior: Behavior normal          Thought Content: Thought content normal          Judgment: Judgment normal       Diabetic Foot Exam    Patient's shoes and socks removed  Right Foot/Ankle   Right Foot Inspection  Skin Exam: skin normal and skin intact  No dry skin, no warmth, no callus, no erythema, no maceration, no abnormal color, no pre-ulcer, no ulcer and no callus  Toe Exam: ROM and strength within normal limits  Sensory   Vibration: intact  Monofilament testing: absent    Vascular  Capillary refills: < 3 seconds  The right DP pulse is 1+  Left Foot/Ankle  Left Foot Inspection  Skin Exam: skin normal and skin intact  No dry skin, no warmth, no erythema, no maceration, normal color, no pre-ulcer, no ulcer and no callus  Toe Exam: ROM and strength within normal limits  Sensory   Vibration: intact  Monofilament testing: absent    Vascular  Capillary refills: < 3 seconds  The left DP pulse is 1+       Assign Risk Category  No deformity present  Loss of protective sensation  Weak pulses  Risk: 2        Shahana Lamb, [address] [facility]

## 2022-10-20 NOTE — ASSESSMENT & PLAN NOTE
Lipid panel is much improved  Continue statins  Encouraged lifestyle modifications including diet and increased exercise

## 2022-11-09 DIAGNOSIS — E10.9 TYPE 1 DIABETES MELLITUS WITHOUT COMPLICATION (HCC): ICD-10-CM

## 2022-11-09 RX ORDER — FLASH GLUCOSE SENSOR
KIT MISCELLANEOUS
Qty: 6 EACH | Refills: 1 | Status: SHIPPED | OUTPATIENT
Start: 2022-11-09

## 2022-11-17 DIAGNOSIS — E10.9 TYPE 1 DIABETES MELLITUS WITHOUT COMPLICATION (HCC): ICD-10-CM

## 2022-11-17 RX ORDER — INSULIN DETEMIR 100 [IU]/ML
INJECTION, SOLUTION SUBCUTANEOUS
Qty: 69 ML | Refills: 1 | Status: SHIPPED | OUTPATIENT
Start: 2022-11-17

## 2022-12-18 DIAGNOSIS — E78.2 MIXED HYPERLIPIDEMIA: ICD-10-CM

## 2022-12-19 RX ORDER — ATORVASTATIN CALCIUM 40 MG/1
TABLET, FILM COATED ORAL
Qty: 90 TABLET | Refills: 1 | Status: SHIPPED | OUTPATIENT
Start: 2022-12-19

## 2022-12-27 DIAGNOSIS — E10.9 TYPE 1 DIABETES MELLITUS WITHOUT COMPLICATION (HCC): ICD-10-CM

## 2022-12-28 RX ORDER — PEN NEEDLE, DIABETIC 31 GX5/16"
NEEDLE, DISPOSABLE MISCELLANEOUS 4 TIMES DAILY
Qty: 200 EACH | Refills: 3 | Status: SHIPPED | OUTPATIENT
Start: 2022-12-28

## 2023-02-28 ENCOUNTER — TELEPHONE (OUTPATIENT)
Dept: ENDOCRINOLOGY | Facility: CLINIC | Age: 43
End: 2023-02-28

## 2023-02-28 DIAGNOSIS — E10.9 TYPE 1 DIABETES MELLITUS WITHOUT COMPLICATION (HCC): ICD-10-CM

## 2023-02-28 DIAGNOSIS — Z76.0 MEDICATION REFILL: ICD-10-CM

## 2023-03-01 ENCOUNTER — OFFICE VISIT (OUTPATIENT)
Dept: ENDOCRINOLOGY | Facility: CLINIC | Age: 43
End: 2023-03-01

## 2023-03-01 VITALS
TEMPERATURE: 97.4 F | DIASTOLIC BLOOD PRESSURE: 90 MMHG | SYSTOLIC BLOOD PRESSURE: 126 MMHG | BODY MASS INDEX: 36.82 KG/M2 | HEART RATE: 86 BPM | WEIGHT: 263 LBS | OXYGEN SATURATION: 98 % | HEIGHT: 71 IN

## 2023-03-01 DIAGNOSIS — E55.9 VITAMIN D DEFICIENCY: ICD-10-CM

## 2023-03-01 DIAGNOSIS — E66.9 OBESITY (BMI 30-39.9): ICD-10-CM

## 2023-03-01 DIAGNOSIS — Z76.0 MEDICATION REFILL: ICD-10-CM

## 2023-03-01 DIAGNOSIS — E10.65 TYPE 1 DIABETES MELLITUS WITH HYPERGLYCEMIA (HCC): Primary | ICD-10-CM

## 2023-03-01 DIAGNOSIS — E10.9 TYPE 1 DIABETES MELLITUS WITHOUT COMPLICATION (HCC): ICD-10-CM

## 2023-03-01 DIAGNOSIS — E78.2 MIXED HYPERLIPIDEMIA: ICD-10-CM

## 2023-03-01 RX ORDER — INSULIN ASPART 100 [IU]/ML
INJECTION, SOLUTION INTRAVENOUS; SUBCUTANEOUS
Qty: 60 ML | Refills: 1 | Status: SHIPPED | OUTPATIENT
Start: 2023-03-01 | End: 2023-03-01 | Stop reason: SDUPTHER

## 2023-03-01 RX ORDER — INSULIN DEGLUDEC 200 U/ML
INJECTION, SOLUTION SUBCUTANEOUS
Qty: 15 ML | Refills: 1 | Status: SHIPPED | OUTPATIENT
Start: 2023-03-01 | End: 2023-03-10 | Stop reason: ALTCHOICE

## 2023-03-01 RX ORDER — INSULIN ASPART 100 [IU]/ML
INJECTION, SOLUTION INTRAVENOUS; SUBCUTANEOUS
Qty: 60 ML | Refills: 1 | Status: SHIPPED | OUTPATIENT
Start: 2023-03-01

## 2023-03-01 NOTE — PROGRESS NOTES
Follow-up Patient Progress Note      CC: type 1 diabetes     History of Present Illness:   Otto Altman is a 42 y  o  male with type 1 diabetes since age 7, peripheral neuropathy, microalbuminuria, probable retinopathy, obesity, vit D deficiency and HLD  No history of MI, CVA or intermittent claudication  Last visit was 10/6/2022      Since last visit, he gained 3lbs  He continues to struggle with lifestyle choices      He was previously on a Medtronic insulin pump with a sensor but was unable to tolerate it       CGM data review[de-identified]  Device: milly personal Dates: 12/2/22 - 3/1/23           Usage: 62 %    Av glu: 228 mg/dL               SD:  mg/dL            CV: 35  %             TIR: 27 %            TAR: 26+45 %       TBR: 2 %     Glycemic patters: Significant fasting and postprandial hyperglycemia  Rare hypoglycemia postprandially/post correctional   Hypoglycemia: No        Diet: does not carb count presently; admits to indiscretion  Eats 3 meals per day and frequent late night snack  Hypoglycemia: No     Current meds:  Levemir 50u daily bedtime and levemir 25u AM  Novolog 1u/5g ICR +1 unit/25 above 150 mg/dL     Opthamology: Yes  Podiatry: No  Influenza: COVID - Not yet  Dental:No  Pancreatitis: No    Patient Active Problem List   Diagnosis   • Diabetes mellitus type 1 ([facility] )   • Mixed hyperlipidemia   • Obesity (BMI 30-39  9)   • Vitamin D deficiency     Past Medical History:   Diagnosis Date   • Diabetes mellitus type 1 ([facility] )       Past Surgical History:   Procedure Laterality Date   • NO PAST SURGERIES        Family History   Problem Relation Age of Onset   • Diabetes Mother    • Diabetes Father      Social History     Tobacco Use   • Smoking status: Never   • Smokeless tobacco: Never   Substance Use Topics   • Alcohol use: Not Currently     Comment: on occasion     No Known Allergies      Current Outpatient Medications:   •  atorvastatin (LIPITOR) 40 mg tablet, TAKE 1 TABLET BY MOUTH EVERY DAY, Disp: 90 tablet, Rfl: 1  •  B-D ULTRAFINE III SHORT PEN 31G X 8 MM MISC, INJECT UNDER THE SKIN 4 (FOUR) TIMES A DAY, Disp: 200 each, Rfl: 3  •  ciclopirox (PENLAC) 8 % solution, APPLY TO THE AFFECTED AREA(S) TOPICALLY ONCE DAILY PREFERABLY AT BEDTIME OR 8 HOURS BEFORE WASHING, Disp: , Rfl:   •  Continuous Blood Gluc  (FreeStyle Demond 2 [place]) POLA, Use daily as directed, Disp: 1 each, Rfl: 0  •  Continuous Blood Gluc Sensor (FreeStyle Demond 2 Sensor) MISC, Change sensor every 14 days, Disp: 6 each, Rfl: 1  •  ergocalciferol (VITAMIN D2) 50,000 units, TAKE 1 CAPSULE (50,000 UNITS TOTAL) BY MOUTH ONCE A WEEK FOR 20 DOSES, Disp: 12 capsule, Rfl: 1  •  insulin detemir (Levemir FlexTouch) 100 Units/mL injection pen, INJECT 25 UNITS EVERY MORNING AND 50 UNITS EVERY EVENING, Disp: 69 mL, Rfl: 1  •  Multiple Vitamins-Minerals (MULTIVITAMIN ADULT PO), Take by mouth, Disp: , Rfl:   •  Omega-3 Fatty Acids (fish oil) 1,000 mg, Take 1,000 mg by mouth daily, Disp: , Rfl:   •  OneTouch Verio test strip, USE 1 EACH 4 (FOUR) TIMES A DAY, Disp: 100 strip, Rfl: 9  •  VITAMIN D PO, Take 1,000 Units by mouth daily  , Disp: , Rfl:   •  atorvastatin (LIPITOR) 40 mg tablet, Take 40 mg by mouth daily (Patient not taking: Reported on 3/1/2023), Disp: , Rfl:   •  insulin aspart (NovoLOG FlexPen) 100 UNIT/ML injection pen, With each meal - Inject 1 unit for 5 grams of carbs + 1 unit for every 25 mg/dl above 150mg/dL  Max dose of 60 units per day , Disp: 60 mL, Rfl: 1    Review of Systems   Constitutional: Positive for activity change and appetite change  HENT: Negative  Eyes: Negative  Respiratory: Negative  Cardiovascular: Negative for chest pain  Gastrointestinal: Negative  Endocrine: Negative  Genitourinary: Negative  Musculoskeletal: Negative  Skin: Negative  Allergic/Immunologic: Negative  Neurological: Negative  Hematological: Negative  Psychiatric/Behavioral: Negative      All other systems reviewed and are negative  Physical Exam:  Body mass index is 36 68 kg/m²  /90 (BP Location: Left arm, Patient Position: Sitting, Cuff Size: Large)   Pulse 86   Temp (!) 97 4 °F (36 3 °C) (Tympanic)   Ht 5' 11" (1 803 m)   Wt 119 kg (263 lb)   SpO2 98%   BMI 36 68 kg/m²    Vitals:    03/01/23 1046   Weight: 119 kg (263 lb)        Physical Exam  Constitutional:       General: He is not in acute distress  Appearance: He is well-developed  He is not ill-appearing  HENT:      Head: Normocephalic and atraumatic  Nose: Nose normal       Mouth/Throat:      Pharynx: Oropharynx is clear  Eyes:      Extraocular Movements: Extraocular movements intact  Conjunctiva/sclera: Conjunctivae normal    Neck:      Thyroid: No thyromegaly  Cardiovascular:      Rate and Rhythm: Normal rate  Pulmonary:      Effort: Pulmonary effort is normal    Musculoskeletal:         General: No deformity  Cervical back: Normal range of motion  Skin:     Capillary Refill: Capillary refill takes less than 2 seconds  Coloration: Skin is not pale  Findings: No rash  Neurological:      Mental Status: He is alert and oriented to person, place, and time     Psychiatric:         Behavior: Behavior normal          Labs:   Lab Results   Component Value Date    HGBA1C 10 7 (H) 10/06/2022       Lab Results   Component Value Date    [id number] 1 330 10/06/2022       Lab Results   Component Value Date    CREATININE 1 07 10/06/2022    CREATININE 1 08 03/29/2021    CREATININE 1 00 06/16/2020    BUN 10 10/06/2022    K 4 1 10/06/2022     10/06/2022    CO2 29 10/06/2022     eGFR   Date Value Ref Range Status   10/06/2022 83 ml/min/1 73sq m Final       Lab Results   Component Value Date    ALT 26 10/06/2022    AST 12 10/06/2022    ALKPHOS 74 10/06/2022       Lab Results   Component Value Date    CHOLESTEROL 160 10/06/2022    CHOLESTEROL 260 (H) 04/01/2022    CHOLESTEROL 248 (H) 03/29/2021     Lab Results   Component Value Date    HDL 46 10/06/2022    HDL 35 (L) 04/01/2022    HDL 40 03/29/2021     Lab Results   Component Value Date    TRIG 148 10/06/2022    TRIG 555 (H) 04/01/2022    TRIG 363 (H) 03/29/2021     Lab Results   Component Value Date    [place] 114 10/06/2022    [place] 225 04/01/2022    [place] 208 03/29/2021         Impression:  1  Type 1 diabetes mellitus with hyperglycemia (HCC)    2  Vitamin D deficiency    3  Obesity (BMI 30-39 9)    4  Mixed hyperlipidemia    5  Medication refill    6  Type 1 diabetes mellitus without complication ([facility] )         Plan:    Diagnoses and all orders for this visit:    Type 1 diabetes mellitus with hyperglycemia ([facility] )  He is uncontrolled with an A1c of 10 8%  Goal is 7%  We have struggled to achieve glycemic targets for few years  Today I counseled about risks of persistent hyperglycemia again  Also outlined shared responsibility for poor glycemic control on both physician and patient  Offered a second opinion  Offered an insulin pump either OmniPod or tandem t:slim  Suggested use of acarbose or GLP-1 agonist to help reduce weight and improve glycemic homeostasis  Reviewed [facility] data last 3 months  We agreed to switch back to [place] as patient's believes this was able to maintain better glycemic control  We will increase dose as listed below  Continue current dose NovoLog as listed below  Follow-up in 3 months with repeat labs as listed  -     insulin degludec [employer] FlexTouch) 200 units/mL CONCENTRATED U-200 injection pen; Use 50u Q12 hr  -     insulin aspart (NovoLOG FlexPen) 100 UNIT/ML injection pen; With each meal - Inject 1 unit for 5 grams of carbs + 1 unit for every 25 mg/dl above 150mg/dL  Max dose of 60 units per day  -     Hemoglobin A1C; Future  -     Microalbumin / creatinine urine ratio; Future    Vitamin D deficiency  On replacement with correction of deficiency  Obesity (BMI 30-39  9)    Mixed hyperlipidemia    Significant improvement in dyslipidemia  I have spent 28 minutes with patient today in which greater than 50% of this time was spent in counseling/coordination of care  Discussed with the patient and all questioned fully answered  He will call me if any problems arise  Educated/ Counseled patient on diagnostic test results, prognosis, risk vs benefit of treatment options, importance of treatment compliance, healthy life and lifestyle choices        Monika Pina

## 2023-03-01 NOTE — PATIENT INSTRUCTIONS
Acarbose (By mouth)   Acarbose (AY-sherice-jesus)  Improves glycemic control in adults with diabetes  Brand Name(s): Precose   There may be other brand names for this medicine  When This Medicine Should Not Be Used: This medicine is not right for everyone  Do not use it if you had an allergic reaction to acarbose, or if you have cirrhosis or a bowel disorder such as colitis, Crohn disease, or a blockage in your bowel  How to Use This Medicine:   Tablet  Your doctor will tell you how much medicine to use  Do not use more than directed  Take your medicine with the first bite of each main meal   Missed dose: If you remember the missed dose while you are still eating or right after you finished your meal, take the dose right away  Otherwise, wait until your next main meal to take your medicine  Do not take 2 doses at the same time  Store the medicine in a closed container at room temperature, away from heat, moisture, and direct light  Drugs and Foods to Avoid:   Ask your doctor or pharmacist before using any other medicine, including over-the-counter medicines, vitamins, and herbal products  Do not take acarbose with medicines used to help digest food, such as amylase or pancreatin  Some medicines can affect how acarbose works  Tell your doctor if you are taking any of the following:   Digoxin, isoniazid, niacin, phenytoin  A calcium channel blocker, cold or allergy medicine, diuretics (water pills), estrogen or birth control pills, phenothiazine medicines (such as promethazine, chlorpromazine), steroid medicine, thyroid medicine  Warnings While Using This Medicine:   Tell your doctor if you are pregnant or breastfeeding, or if you have kidney disease or any problems with your bowels  You may need to keep glucose tablets or gel with you in case your blood sugar level becomes too low  Table sugar may not work as well, because acarbose keeps your body from absorbing regular sugar quickly    Keep all medicine out of the reach of children  Never share your medicine with anyone  Possible Side Effects While Using This Medicine:   Call your doctor right away if you notice any of these side effects: Allergic reaction: Itching or hives, swelling in your face or hands, swelling or tingling in your mouth or throat, chest tightness, trouble breathing  Shaking, trembling, sweating, fast or pounding heartbeat, lightheadedness, hunger, confusion  If you notice these less serious side effects, talk with your doctor:   Gas, diarrhea, or stomach discomfort  If you notice other side effects that you think are caused by this medicine, tell your doctor  Call your doctor for medical advice about side effects  You may report side effects to FDA at 5-892-FDA-5419  © Copyright Jose Luis Thompson 2022 Information is for End User's use only and may not be sold, redistributed or otherwise used for commercial purposes  The above information is an  only  It is not intended as medical advice for individual conditions or treatments  Talk to your doctor, nurse or pharmacist before following any medical regimen to see if it is safe and effective for you  Semaglutide (By injection)   Semaglutide (xgi-y-IQFJ-tide)  Treats type 2 diabetes  Lowers the risk of heart attack, stroke, or death in patients with type 2 diabetes and heart or blood vessel disease  Also used to help lose weight and keep the weight off in patients with obesity caused by certain conditions  Brand Name(s): Ozempic 0 25 MG or 0 5 MG Doses, Ozempic 1 MG Doses, Ozempic 2 MG Doses, Wegovy   There may be other brand names for this medicine  When This Medicine Should Not Be Used: This medicine is not right for everyone  Do not use it if you had an allergic reaction to semaglutide, or if you have multiple endocrine neoplasia syndrome type 2 (MEN 2) or if you or anyone in your family has had medullary thyroid cancer    How to Use This Medicine:   Injectable  Your doctor will prescribe your exact dose and tell you how often it should be given  This medicine is given as a shot under your skin  It is given into your stomach, thigh, or upper arm  You may be taught how to give your medicine at home  Make sure you understand all instructions before giving yourself an injection  Do not use more medicine or use it more often than your doctor tells you to  If you use insulin in addition to this medicine, do not mix them into the same syringe  You may give the shots in the same area (including your stomach), but do not give the shots right next to each other  Check the liquid in the pen  It should be clear and colorless  Do not use it if it is cloudy, discolored, or has particles in it  You will be shown the body areas where this shot can be given  Use a different body area each time you give yourself a shot  Keep track of where you give each shot to make sure you rotate body areas  Use a new needle and syringe each time you inject your medicine  Never share medicine pens with others under any circumstances  Sharing needles or pens can result in transmission of infection  This medicine should come with a Medication Guide  Ask your pharmacist for a copy if you do not have one  Missed dose:   Ozempic®: If you miss a dose of this medicine, use it as soon as possible within 5 days after the missed dose  If you miss a dose for more than 5 days, skip the missed dose and go back to your regular dosing schedule  Wegovy™: If you miss a dose, and the next scheduled dose is more than 2 days away, use it as soon as possible  If you miss a dos, and the next scheduled dose is less than 2 days away, skip the missed dose and go back to your regular dosing schedule  If you miss a dose of this medicine for more than 2 weeks, use it on the next scheduled dose  Ask your doctor about how to restart your treatment  Store your new, unused medicine pen in its original carton in the refrigerator  Do not freeze  You may store the opened Ozempic® pen in the refrigerator or at room temperature for 56 days or the opened [facility] pen in the refrigerator or at room temperature for 28 days  Throw away the pen after you use it for 56 days for Ozempic® or 28 days for [facility], even if it still has medicine in it  Drugs and Foods to Avoid:   Ask your doctor or pharmacist before using any other medicine, including over-the-counter medicines, vitamins, and herbal products  Some medicines may affect how semaglutide works  Tell your doctor if you are using other diabetes medicine (including glimepiride, glipizide, glyburide, or insulin) or any oral medicine  Warnings While Using This Medicine:   Tell your doctor if you are pregnant or planning to become pregnant  Do not use this medicine for at least 2 months before you plan to become pregnant  Tell your doctor if you are breastfeeding, or if you have kidney disease, pancreas problems, diabetes, digestion problems, or a history of diabetic retinopathy or depression  This medicine may cause the following problems:  Increased risk of thyroid tumor  Pancreatitis (swelling of the pancreas)  Gallbladder problems, including cholelithiasis, cholecystitis  Low blood sugar (when used with other diabetes medicine)  Kidney problems  Eye or vision problems, including diabetic retinopathy  Increased heart rate  Increased risk for depression or thoughts of suicide  Your doctor will do lab tests at regular visits to check on the effects of this medicine  Keep all appointments  Keep all medicine out of the reach of children  Never share your medicine with anyone  Possible Side Effects While Using This Medicine:   Call your doctor right away if you notice any of these side effects:   Allergic reaction: Itching or hives, swelling in your face or hands, swelling or tingling in your mouth or throat, chest tightness, trouble breathing  Blurred vision or any other change in vision  Change in how much or how often you urinate, lower back or side pain, blood in your urine  Shaking, trembling, sweating, fast or pounding heartbeat, lightheadedness, hunger, confusion  Sudden and severe stomach pain, nausea, vomiting, fever, passing gas, stomach upset or bloating, yellow eyes or skin  Unusual moods or behaviors, depression, thoughts of hurting yourself or others  If you notice these less serious side effects, talk with your doctor:   Constipation, diarrhea  Headache, dizziness  Redness, itching, bump, swelling, or any changes in your skin where the shot was given  Tiredness  If you notice other side effects that you think are caused by this medicine, tell your doctor  Call your doctor for medical advice about side effects  You may report side effects to FDA at 6-612-FDA-8094  © Copyright [employer] 2022 Information is for End User's use only and may not be sold, redistributed or otherwise used for commercial purposes  The above information is an  only  It is not intended as medical advice for individual conditions or treatments  Talk to your doctor, nurse or pharmacist before following any medical regimen to see if it is safe and effective for you

## 2023-03-09 ENCOUNTER — TELEPHONE (OUTPATIENT)
Dept: ENDOCRINOLOGY | Facility: CLINIC | Age: 43
End: 2023-03-09

## 2023-03-09 NOTE — TELEPHONE ENCOUNTER
Pharmacy called to let us know the insurance will not cover insulin degludec [employer] FlexTouch) 200 units/mL CONCENTRATED U-200 injection pen  Insurances preferred medication is either Lantus or Levemir  Would like call back to discuss or for a prior auth to be done if Dr Durrell Brunner absolutely wants to continue with the Tresiba FlexTouch

## 2023-03-10 DIAGNOSIS — E10.65 TYPE 1 DIABETES MELLITUS WITH HYPERGLYCEMIA (HCC): ICD-10-CM

## 2023-03-10 DIAGNOSIS — E10.65 TYPE 1 DIABETES MELLITUS WITH HYPERGLYCEMIA (HCC): Primary | ICD-10-CM

## 2023-03-10 RX ORDER — INSULIN DETEMIR 100 [IU]/ML
50 INJECTION, SOLUTION SUBCUTANEOUS EVERY 12 HOURS SCHEDULED
Qty: 30 ML | Refills: 3 | Status: SHIPPED | OUTPATIENT
Start: 2023-03-10 | End: 2023-03-13

## 2023-03-10 NOTE — TELEPHONE ENCOUNTER
Pharmacy called back again to let us know the insulin detemir (Levemir FlexTouch) 100 Units/mL injection pen is no longer available  They are instead being replaced by Levemir FlexPen  Pharmacy is asking for an updated prescription to be sent

## 2023-03-13 DIAGNOSIS — E10.65 TYPE 1 DIABETES MELLITUS WITH HYPERGLYCEMIA (HCC): Primary | ICD-10-CM

## 2023-03-13 RX ORDER — INSULIN DETEMIR 100 [IU]/ML
50 INJECTION, SOLUTION SUBCUTANEOUS EVERY 12 HOURS SCHEDULED
Qty: 30 ML | Refills: 3 | Status: SHIPPED | OUTPATIENT
Start: 2023-03-13

## 2023-03-13 RX ORDER — INSULIN DETEMIR 100 [IU]/ML
50 INJECTION, SOLUTION SUBCUTANEOUS EVERY 12 HOURS SCHEDULED
Qty: 30 ML | Refills: 3 | Status: SHIPPED | OUTPATIENT
Start: 2023-03-13 | End: 2023-03-13

## 2023-05-15 ENCOUNTER — TELEPHONE (OUTPATIENT)
Dept: ENDOCRINOLOGY | Facility: CLINIC | Age: 43
End: 2023-05-15

## 2023-05-15 DIAGNOSIS — E10.9 TYPE 1 DIABETES MELLITUS WITHOUT COMPLICATION (HCC): ICD-10-CM

## 2023-05-18 ENCOUNTER — TELEPHONE (OUTPATIENT)
Dept: ENDOCRINOLOGY | Facility: CLINIC | Age: 43
End: 2023-05-18

## 2023-05-18 NOTE — TELEPHONE ENCOUNTER
Pt called stated he needs refill sent to [facility] in [place] (on file)  He will be out of it tomorrow  Stated pharmacy sent in fax too

## 2023-05-19 DIAGNOSIS — E10.9 TYPE 1 DIABETES MELLITUS WITHOUT COMPLICATION (HCC): ICD-10-CM

## 2023-05-19 DIAGNOSIS — Z76.0 MEDICATION REFILL: ICD-10-CM

## 2023-05-19 RX ORDER — INSULIN ASPART 100 [IU]/ML
INJECTION, SOLUTION INTRAVENOUS; SUBCUTANEOUS
Qty: 72 ML | Refills: 1 | Status: SHIPPED | OUTPATIENT
Start: 2023-05-19

## 2023-05-19 NOTE — TELEPHONE ENCOUNTER
Pt left requesting a call back today  Stated the pharmacy told him they need pre auth, he believes he was prescribed Novolog but his insurance only covers Aspart

## 2023-05-31 ENCOUNTER — APPOINTMENT (OUTPATIENT)
Age: 43
End: 2023-05-31
Payer: COMMERCIAL

## 2023-05-31 DIAGNOSIS — E10.65 TYPE 1 DIABETES MELLITUS WITH HYPERGLYCEMIA (HCC): ICD-10-CM

## 2023-05-31 DIAGNOSIS — E78.2 MIXED HYPERLIPIDEMIA: ICD-10-CM

## 2023-05-31 LAB
25(OH)D3 SERPL-MCNC: 37.1 NG/ML (ref 30–100)
ALBUMIN SERPL BCP-MCNC: 3.3 G/DL (ref 3.5–5)
ALP SERPL-CCNC: 98 U/L (ref 46–116)
ALT SERPL W P-5'-P-CCNC: 33 U/L (ref 12–78)
ANION GAP SERPL CALCULATED.3IONS-SCNC: 5 MMOL/L (ref 4–13)
AST SERPL W P-5'-P-CCNC: 24 U/L (ref 5–45)
BASOPHILS # BLD AUTO: 0.02 THOUSANDS/ÂΜL (ref 0–0.1)
BASOPHILS NFR BLD AUTO: 0 % (ref 0–1)
BILIRUB SERPL-MCNC: 0.52 MG/DL (ref 0.2–1)
BUN SERPL-MCNC: 11 MG/DL (ref 5–25)
CALCIUM ALBUM COR SERPL-MCNC: 10.1 MG/DL (ref 8.3–10.1)
CALCIUM SERPL-MCNC: 9.5 MG/DL (ref 8.3–10.1)
CHLORIDE SERPL-SCNC: 106 MMOL/L (ref 96–108)
CHOLEST SERPL-MCNC: 254 MG/DL
CO2 SERPL-SCNC: 26 MMOL/L (ref 21–32)
CREAT SERPL-MCNC: 1.06 MG/DL (ref 0.6–1.3)
CREAT UR-MCNC: 213 MG/DL
EOSINOPHIL # BLD AUTO: 0.12 THOUSAND/ÂΜL (ref 0–0.61)
EOSINOPHIL NFR BLD AUTO: 2 % (ref 0–6)
ERYTHROCYTE [DISTWIDTH] IN BLOOD BY AUTOMATED COUNT: 13.6 % (ref 11.6–15.1)
GFR SERPL CREATININE-BSD FRML MDRD: 84 ML/MIN/1.73SQ M
GLUCOSE P FAST SERPL-MCNC: 226 MG/DL (ref 65–99)
HCT VFR BLD AUTO: 45.5 % (ref 36.5–49.3)
HDLC SERPL-MCNC: 50 MG/DL
HGB BLD-MCNC: 14.6 G/DL (ref 12–17)
IMM GRANULOCYTES # BLD AUTO: 0.01 THOUSAND/UL (ref 0–0.2)
IMM GRANULOCYTES NFR BLD AUTO: 0 % (ref 0–2)
LDLC SERPL CALC-MCNC: 156 MG/DL (ref 0–100)
LYMPHOCYTES # BLD AUTO: 1.79 THOUSANDS/ÂΜL (ref 0.6–4.47)
LYMPHOCYTES NFR BLD AUTO: 34 % (ref 14–44)
MCH RBC QN AUTO: 28 PG (ref 26.8–34.3)
MCHC RBC AUTO-ENTMCNC: 32.1 G/DL (ref 31.4–37.4)
MCV RBC AUTO: 87 FL (ref 82–98)
MICROALBUMIN UR-MCNC: 35.6 MG/L (ref 0–20)
MICROALBUMIN/CREAT 24H UR: 17 MG/G CREATININE (ref 0–30)
MONOCYTES # BLD AUTO: 0.41 THOUSAND/ÂΜL (ref 0.17–1.22)
MONOCYTES NFR BLD AUTO: 8 % (ref 4–12)
NEUTROPHILS # BLD AUTO: 2.86 THOUSANDS/ÂΜL (ref 1.85–7.62)
NEUTS SEG NFR BLD AUTO: 56 % (ref 43–75)
NONHDLC SERPL-MCNC: 204 MG/DL
NRBC BLD AUTO-RTO: 0 /100 WBCS
PLATELET # BLD AUTO: 231 THOUSANDS/UL (ref 149–390)
PMV BLD AUTO: 12 FL (ref 8.9–12.7)
POTASSIUM SERPL-SCNC: 4.1 MMOL/L (ref 3.5–5.3)
PROT SERPL-MCNC: 7.9 G/DL (ref 6.4–8.4)
RBC # BLD AUTO: 5.22 MILLION/UL (ref 3.88–5.62)
SODIUM SERPL-SCNC: 137 MMOL/L (ref 135–147)
TRIGL SERPL-MCNC: 240 MG/DL
TSH SERPL DL<=0.05 MIU/L-ACNC: 0.94 UIU/ML (ref 0.45–4.5)
WBC # BLD AUTO: 5.21 THOUSAND/UL (ref 4.31–10.16)

## 2023-05-31 PROCEDURE — 80061 LIPID PANEL: CPT

## 2023-05-31 PROCEDURE — 83036 HEMOGLOBIN GLYCOSYLATED A1C: CPT

## 2023-05-31 PROCEDURE — 82570 ASSAY OF URINE CREATININE: CPT

## 2023-05-31 PROCEDURE — 36415 COLL VENOUS BLD VENIPUNCTURE: CPT

## 2023-05-31 PROCEDURE — 85025 COMPLETE CBC W/AUTO DIFF WBC: CPT

## 2023-05-31 PROCEDURE — 80053 COMPREHEN METABOLIC PANEL: CPT

## 2023-05-31 PROCEDURE — 82306 VITAMIN D 25 HYDROXY: CPT

## 2023-05-31 PROCEDURE — 84443 ASSAY THYROID STIM HORMONE: CPT

## 2023-05-31 PROCEDURE — 82043 UR ALBUMIN QUANTITATIVE: CPT

## 2023-06-01 ENCOUNTER — OFFICE VISIT (OUTPATIENT)
Dept: ENDOCRINOLOGY | Facility: CLINIC | Age: 43
End: 2023-06-01

## 2023-06-01 VITALS
OXYGEN SATURATION: 98 % | WEIGHT: 264 LBS | HEIGHT: 71 IN | HEART RATE: 103 BPM | BODY MASS INDEX: 36.96 KG/M2 | SYSTOLIC BLOOD PRESSURE: 138 MMHG | DIASTOLIC BLOOD PRESSURE: 88 MMHG

## 2023-06-01 DIAGNOSIS — E55.9 VITAMIN D DEFICIENCY: ICD-10-CM

## 2023-06-01 DIAGNOSIS — E10.65 TYPE 1 DIABETES MELLITUS WITH HYPERGLYCEMIA (HCC): Primary | ICD-10-CM

## 2023-06-01 DIAGNOSIS — Z76.0 MEDICATION REFILL: ICD-10-CM

## 2023-06-01 DIAGNOSIS — E78.2 MIXED HYPERLIPIDEMIA: ICD-10-CM

## 2023-06-01 DIAGNOSIS — E10.9 TYPE 1 DIABETES MELLITUS WITHOUT COMPLICATION (HCC): ICD-10-CM

## 2023-06-01 DIAGNOSIS — E66.9 OBESITY (BMI 30-39.9): ICD-10-CM

## 2023-06-01 LAB
EST. AVERAGE GLUCOSE BLD GHB EST-MCNC: 258 MG/DL
HBA1C MFR BLD: 10.6 %

## 2023-06-01 RX ORDER — PROCHLORPERAZINE 25 MG/1
1 SUPPOSITORY RECTAL CONTINUOUS
Qty: 1 EACH | Refills: 0 | Status: SHIPPED | OUTPATIENT
Start: 2023-06-01

## 2023-06-01 RX ORDER — PROCHLORPERAZINE 25 MG/1
SUPPOSITORY RECTAL
Qty: 3 EACH | Refills: 5 | Status: SHIPPED | OUTPATIENT
Start: 2023-06-01

## 2023-06-01 RX ORDER — PROCHLORPERAZINE 25 MG/1
SUPPOSITORY RECTAL
Qty: 1 EACH | Refills: 1 | Status: SHIPPED | OUTPATIENT
Start: 2023-06-01

## 2023-06-01 RX ORDER — INSULIN ASPART 100 [IU]/ML
INJECTION, SOLUTION INTRAVENOUS; SUBCUTANEOUS
Qty: 72 ML | Refills: 1 | Status: SHIPPED | OUTPATIENT
Start: 2023-06-01

## 2023-06-01 RX ORDER — INSULIN DETEMIR 100 [IU]/ML
50 INJECTION, SOLUTION SUBCUTANEOUS EVERY 12 HOURS SCHEDULED
Qty: 30 ML | Refills: 3 | Status: SHIPPED | OUTPATIENT
Start: 2023-06-01

## 2023-06-01 NOTE — PROGRESS NOTES
Follow-up Patient Progress Note      CC: type 1 diabetes     History of Present Illness:   Otto Altman is a 42 y  o  male with type 1 diabetes since age 7, peripheral neuropathy, microalbuminuria, probable retinopathy, obesity, vit D deficiency and HLD  No history of MI, CVA or intermittent claudication  Last visit was 3/1/23      Since last visit, he gained 1 lbs  He continues to struggle with lifestyle choices  He has started changing his job and other lifestyle aspects and hopes to see benefits in future      He was previously on a [employer] insulin pump with a sensor but was unable to tolerate it       CGM data review[de-identified]  Device: milly personal Dates: 5/18/23 - 6/1/23           Usage: 42 %     Av glu: 216 mg/dL               SD:  mg/dL            CV: 30 %             TIR: 36 %            TAR: 24+39 %       TBR: 1 %     Glycemic patters: Significant fasting and postprandial hyperglycemia mostly postprandial  Rare hypoglycemia correction  Hypoglycemia: No     Diet: does not carb count presently; admits to indiscretion  Eats 3 meals per day and frequent late night snack  Hypoglycemia: No     Current meds:  Levemir 50u daily bedtime and levemir 25u AM  Novolog 1u/5g ICR +1 unit/25 above 150 mg/dL     Opthamology: Yes  Podiatry: No  Influenza: COVID - Not yet  Dental:No  Pancreatitis: No    Patient Active Problem List   Diagnosis   • Diabetes mellitus type 1 ([facility] )   • Mixed hyperlipidemia   • Obesity (BMI 30-39  9)   • Vitamin D deficiency     Past Medical History:   Diagnosis Date   • [facility] mellitus type [facility] ([facility] )       Past Surgical History:   Procedure Laterality Date   • NO PAST SURGERIES        Family History   Problem Relation Age of Onset   • Diabetes Mother    • Diabetes Father      Social History     Tobacco Use   • Smoking status: Never   • Smokeless tobacco: Never   Substance Use Topics   • Alcohol use: Not Currently     Comment: on occasion     No Known Allergies      Current Outpatient Medications: •  atorvastatin (LIPITOR) 40 mg tablet, TAKE 1 TABLET BY MOUTH EVERY DAY, Disp: 90 tablet, Rfl: 1  •  ergocalciferol (VITAMIN D2) 50,000 units, Take 1 capsule (50,000 Units total) by mouth once a week, Disp: 20 capsule, Rfl: 0  •  insulin aspart (NovoLOG FlexPen) 100 UNIT/ML injection pen, WITH EACH MEAL - INJECT 1 UNIT FOR 5 GRAMS OF CARBS + 1 UNIT FOR EVERY 25 MG/DL ABOUT 150 MG/DL  MAX DOSE OF 60 UNITS PER DAY , Disp: 72 mL, Rfl: 1  •  insulin detemir (Levemir FlexPen) 100 Units/mL injection pen, Inject 50 Units under the skin every 12 (twelve) hours, Disp: 30 mL, Rfl: 3  •  Multiple Vitamins-Minerals (MULTIVITAMIN ADULT PO), Take by mouth, Disp: , Rfl:   •  Omega-3 Fatty Acids (fish oil) 1,000 mg, Take 1,000 mg by mouth daily, Disp: , Rfl:   •  VITAMIN D PO, Take 1,000 Units by mouth daily  , Disp: , Rfl:   •  atorvastatin (LIPITOR) 40 mg tablet, Take 40 mg by mouth daily (Patient not taking: Reported on 3/1/2023), Disp: , Rfl:   •  B-D ULTRAFINE III SHORT PEN 31G X 8 MM MISC, INJECT UNDER THE SKIN 4 (FOUR) TIMES A DAY, Disp: 200 each, Rfl: 3  •  ciclopirox (PENLAC) 8 % solution, APPLY TO THE AFFECTED AREA(S) TOPICALLY ONCE DAILY PREFERABLY AT BEDTIME OR 8 HOURS BEFORE WASHING, Disp: , Rfl:   •  Continuous Blood Gluc  (FreeStyle [place] 2 [place]) POLA, Use daily as directed, Disp: 1 each, Rfl: 0  •  Continuous Blood Gluc Sensor (FreeStyle [place] 2 Sensor) MISC, Change sensor every 14 days, Disp: 6 each, Rfl: 1  •  OneTouch Verio test strip, USE 1 EACH 4 (FOUR) TIMES A DAY, Disp: 100 strip, Rfl: 9    Review of Systems   Constitutional: Positive for activity change and appetite change  HENT: Negative  Eyes: Negative  Respiratory: Negative  Cardiovascular: Negative for chest pain  Gastrointestinal: Negative  Endocrine: Negative  Genitourinary: Negative  Musculoskeletal: Negative  Skin: Negative  Allergic/Immunologic: Negative  Neurological: Negative  Hematological: Negative  "   Psychiatric/Behavioral: Negative  All other systems reviewed and are negative  Physical Exam:  Body mass index is 36 82 kg/m²  /88 (BP Location: Left arm, Patient Position: Sitting, Cuff Size: Adult)   Pulse 103   Ht 5' 11\" (1 803 m)   Wt 120 kg (264 lb)   SpO2 98%   BMI 36 82 kg/m²    Vitals:    06/01/23 0842   Weight: 120 kg (264 lb)        Physical Exam  Constitutional:       General: He is not in acute distress  Appearance: He is well-developed  He is not ill-appearing  HENT:      Head: Normocephalic and atraumatic  Nose: Nose normal       Mouth/Throat:      Pharynx: Oropharynx is clear  Eyes:      Extraocular Movements: Extraocular movements intact  Conjunctiva/sclera: Conjunctivae normal    Neck:      Thyroid: No thyromegaly  Cardiovascular:      Rate and Rhythm: Normal rate  Pulmonary:      Effort: Pulmonary effort is normal    Musculoskeletal:         General: No deformity  Cervical back: Normal range of motion  Skin:     Capillary Refill: Capillary refill takes less than 2 seconds  Coloration: Skin is not pale  Findings: No rash  Neurological:      Mental Status: He is alert and oriented to person, place, and time     Psychiatric:         Behavior: Behavior normal          Labs:   Lab Results   Component Value Date    HGBA1C 10 6 (H) 06/01/2023       Lab Results   Component Value Date    [id number] 0 939 05/31/2023       Lab Results   Component Value Date    BUN 11 05/31/2023     05/31/2023    CO2 26 05/31/2023    CREATININE 1 06 05/31/2023    CREATININE 1 07 10/06/2022    CREATININE 1 08 03/29/2021    K 4 1 05/31/2023     eGFR   Date Value Ref Range Status   05/31/2023 84 ml/min/1 73sq m Final       Lab Results   Component Value Date    ALKPHOS 98 05/31/2023    ALT 33 05/31/2023    AST 24 05/31/2023       Lab Results   Component Value Date    CHOLESTEROL 254 (H) 05/31/2023    CHOLESTEROL 160 10/06/2022    CHOLESTEROL 260 (H) 04/01/2022 " Lab Results   Component Value Date    HDL 50 05/31/2023    HDL 46 10/06/2022    HDL 35 (L) 04/01/2022     Lab Results   Component Value Date    TRIG 240 (H) 05/31/2023    TRIG 148 10/06/2022    TRIG 555 (H) 04/01/2022     Lab Results   Component Value Date    [place] 204 05/31/2023    [place] 114 10/06/2022    [place] 225 04/01/2022         Impression:  1  Type 1 diabetes mellitus with hyperglycemia (HCC)    2  Vitamin D deficiency    3  Obesity (BMI 30-39 9)    4  Mixed hyperlipidemia         Plan:    Diagnoses and all orders for this visit:    Type 1 diabetes mellitus with hyperglycemia ([facility] )  He remains grossly uncontrolled with A1c 10 7%  This has been persistent over the last 2 years in spite of changes to his insulin regimen  Poor control is associated with poor food habits and current lifestyle  Today we discussed options and I suggested counseling with the behavioral health therapist for possible food addiction  We will also initiate a GLP-1 agonist to help reduce appetite  Advised to continue Levemir 50 units nightly and 25 units every morning  Continue NovoLog ICR 1 unit per 5 g carbohydrate +1 unit per 25 mg/dL above 150 mg/dL  Advised to use prandial insulin for every snack  Due to patient's work, he is unable to scan the [place] adequately  I therefore suggested to transition to Dexcom G6 that should allow for automatic transmission of data from the sensor to his phone  Prescription was sent today  Send personal CGM data in 2 weeks  Follow-up in 3 months     -     Hemoglobin A1C; Future  -     Albumin / creatinine urine ratio; Future  -     Continuous Blood Gluc  (Dexcom G6 ) POLA; Use 1 Units continuous  -     Continuous Blood Gluc Sensor (Dexcom G6 Sensor) MISC; Use 1 each every 10 days  -     Continuous Blood Gluc Transmit (Dexcom G6 Transmitter) MISC; 1 each every 3 months    Vitamin D deficiency  Advised vitamin D3 5000 IU daily OTC  Obesity (BMI 30-39 9)  Today we discussed all aspects of obesity and metabolism including pathophysiology, risk factors, complications, goal of sustaining weight loss long term, usual propensity to regain weight with short term approaches, follow up needs and medications - efficacy and limitations  Discussed role of endocrinopathies, inflammatory conditions, sleep disorders, mental health disorders, lifestyle issues and polypharmacy and weight gain  Briefly discussed bariatric surgery  Diet: carb controlled diet <1500cal/day/ VLCD, 64oz fluids/day   lifestyle modifications: intermittent fasting and 10,000 steps/day  medical fitness training: muscle building  education: nutrition input    -     Semaglutide-Weight Management (WEGOVY) 0 25 MG/0 5ML; Inject 0 5 mL (0 25 mg total) under the skin once a week Use 0 25mg weekly for 4 weeks then 0 5mg weekly    Mixed hyperlipidemia  His ASCVD risk score is 9 4%  Continue Lipitor 40 nightly  Patient is not fully during adhering to it  I have spent 32 minutes with patient today in which greater than 50% of this time was spent in counseling/coordination of care  Discussed with the patient and all questioned fully answered  He will call me if any problems arise  Educated/ Counseled patient on diagnostic test results, prognosis, risk vs benefit of treatment options, importance of treatment compliance, healthy life and lifestyle choices        [address]

## 2023-06-04 DIAGNOSIS — E78.2 MIXED HYPERLIPIDEMIA: ICD-10-CM

## 2023-06-07 RX ORDER — ATORVASTATIN CALCIUM 40 MG/1
TABLET, FILM COATED ORAL
Qty: 90 TABLET | Refills: 1 | Status: SHIPPED | OUTPATIENT
Start: 2023-06-07

## 2023-06-29 ENCOUNTER — TELEPHONE (OUTPATIENT)
Dept: PSYCHIATRY | Facility: CLINIC | Age: 43
End: 2023-06-29

## 2023-06-29 NOTE — TELEPHONE ENCOUNTER
Reached out to patient in regards to routine referral and adding to proper wait list  Patient interested in talk therapy and med mgmt, prefers male provider but okay with what ever is available, Laverne Lewis and T/A   Writer informer pt of wait-list and placed on Epic wait-list

## 2023-07-03 DIAGNOSIS — E10.9 TYPE 1 DIABETES MELLITUS WITHOUT COMPLICATION (HCC): ICD-10-CM

## 2023-07-03 RX ORDER — BLOOD SUGAR DIAGNOSTIC
STRIP MISCELLANEOUS
Qty: 100 STRIP | Refills: 9 | Status: SHIPPED | OUTPATIENT
Start: 2023-07-03

## 2023-07-24 NOTE — TELEPHONE ENCOUNTER
Pt will send BS log today
Spoke to Pt, [facility] medical is requesting 30 days consecutive blood glucose , Pt will email his BS log  As soon we received his BS log I will fax it to [address] @ [phone] 
stretcher

## 2023-08-03 ENCOUNTER — VBI (OUTPATIENT)
Dept: ADMINISTRATIVE | Facility: OTHER | Age: 43
End: 2023-08-03

## 2023-08-14 DIAGNOSIS — E55.9 VITAMIN D DEFICIENCY: ICD-10-CM

## 2023-08-18 RX ORDER — ERGOCALCIFEROL 1.25 MG/1
CAPSULE ORAL
Qty: 12 CAPSULE | Refills: 2 | Status: SHIPPED | OUTPATIENT
Start: 2023-08-18

## 2023-09-14 ENCOUNTER — TELEPHONE (OUTPATIENT)
Age: 43
End: 2023-09-14

## 2023-09-14 ENCOUNTER — OFFICE VISIT (OUTPATIENT)
Age: 43
End: 2023-09-14
Payer: COMMERCIAL

## 2023-09-14 ENCOUNTER — APPOINTMENT (OUTPATIENT)
Age: 43
End: 2023-09-14
Payer: COMMERCIAL

## 2023-09-14 VITALS
RESPIRATION RATE: 18 BRPM | TEMPERATURE: 97.3 F | HEART RATE: 94 BPM | DIASTOLIC BLOOD PRESSURE: 82 MMHG | HEIGHT: 71 IN | SYSTOLIC BLOOD PRESSURE: 140 MMHG | WEIGHT: 279 LBS | BODY MASS INDEX: 39.06 KG/M2 | OXYGEN SATURATION: 97 %

## 2023-09-14 DIAGNOSIS — R60.1 GENERALIZED EDEMA: ICD-10-CM

## 2023-09-14 DIAGNOSIS — R06.83 SNORING: ICD-10-CM

## 2023-09-14 DIAGNOSIS — E10.65 TYPE 1 DIABETES MELLITUS WITH HYPERGLYCEMIA (HCC): ICD-10-CM

## 2023-09-14 DIAGNOSIS — Z12.11 ENCOUNTER FOR COLORECTAL CANCER SCREENING: ICD-10-CM

## 2023-09-14 DIAGNOSIS — Z12.12 ENCOUNTER FOR COLORECTAL CANCER SCREENING: ICD-10-CM

## 2023-09-14 DIAGNOSIS — E66.9 OBESITY (BMI 30-39.9): ICD-10-CM

## 2023-09-14 DIAGNOSIS — E10.10 TYPE 1 DIABETES MELLITUS WITH KETOACIDOSIS WITHOUT COMA (HCC): Primary | ICD-10-CM

## 2023-09-14 DIAGNOSIS — E78.2 MIXED HYPERLIPIDEMIA: ICD-10-CM

## 2023-09-14 LAB
CREAT UR-MCNC: 272.9 MG/DL
MICROALBUMIN UR-MCNC: 36.1 MG/L
MICROALBUMIN/CREAT 24H UR: 13 MG/G CREATININE (ref 0–30)
SL AMB POCT HEMOGLOBIN AIC: 8.8 (ref ?–6.5)

## 2023-09-14 PROCEDURE — 82570 ASSAY OF URINE CREATININE: CPT

## 2023-09-14 PROCEDURE — 99214 OFFICE O/P EST MOD 30 MIN: CPT | Performed by: FAMILY MEDICINE

## 2023-09-14 PROCEDURE — 83036 HEMOGLOBIN GLYCOSYLATED A1C: CPT | Performed by: FAMILY MEDICINE

## 2023-09-14 PROCEDURE — 82043 UR ALBUMIN QUANTITATIVE: CPT

## 2023-09-14 RX ORDER — FUROSEMIDE 20 MG/1
20 TABLET ORAL DAILY
Qty: 5 TABLET | Refills: 0 | Status: SHIPPED | OUTPATIENT
Start: 2023-09-14 | End: 2023-09-19

## 2023-09-14 NOTE — PROGRESS NOTES
Name: Lana Newby      : 1980      MRN: [id number]  Encounter Provider: Joaquin Hernandez MD  Encounter Date: 2023   Encounter department: [address]     1. Type 1 diabetes mellitus with ketoacidosis without coma ([facility] [address] [facility])  Assessment & Plan:  Uncontrolled on insulin with most recent A1c of 8.8<10     Lab Results   Component Value Date    HGBA1C 8.8 (A) 2023        Pt reports fasting BG ranging between 150-200. Screenings:  - Last fundoscopy (yearly): completed on over a year ago, no diabetic retinopathy.   - Last diabetic foot exam (yearly): completed on __, __ diabetic neuropathy.   - Last U-microalb/Cr (yearly): completed on 2023, currently not ACEi/ARB. - Last   Lab Results   Component Value Date    LDLCALC 156 (H) 2023    currently on atorvastatin. Current lifestyle:  - Diet: discussed diabetic diet, continue to improve. Goal to reduce fast food. Drop to . - Exercise: discussed recommended exercise program. Goal 150min/week on stationary bike. Discussed importance of screening and follow ups. Discussed complications of DM and importance of compliance with medication. Goal   - A1c <7.0%  - BG preprandial , postprandial <180    Plan  • Continue  levemir 50 units at BID, Novolog TID with meals (averaging 60 units total day)  • Adjusted, will add ozempic weekly, pending authorization. • Follow up with specialists, podiatrist, endocrinologist.   • Return for annual physical in October       Orders:  -     POCT hemoglobin A1c  -     semaglutide, 0.25 or 0.5 mg/dose, (Ozempic, 0.25 or 0.5 MG/DOSE,) 2 mg/3 mL injection pen; 0.25 mg under the skin every 7 days for 4 doses (28 days), THEN 0.5 mg under the skin every 7 days  -     Ambulatory Referral to Podiatry; Future  -     Ambulatory Referral to Endocrinology; Future    2. Mixed hyperlipidemia  Assessment & Plan:  Uncontrolled on atorvastatin 40 mg daily.  Continue current regimen and discussed diet and weight extensively. Will follow up with lipid panel in 3 months    Lab Results   Component Value Date    CHOLESTEROL 254 (H) 05/31/2023    CHOLESTEROL 160 10/06/2022    CHOLESTEROL 260 (H) 04/01/2022     Lab Results   Component Value Date    HDL 50 05/31/2023    HDL 46 10/06/2022    HDL 35 (L) 04/01/2022     Lab Results   Component Value Date    TRIG 240 (H) 05/31/2023    TRIG 148 10/06/2022    TRIG 555 (H) 04/01/2022     Lab Results   Component Value Date    [address] 204 05/31/2023    [address] 114 10/06/2022    [address] 225 04/01/2022           3. Encounter for colorectal cancer screening  -     Ambulatory Referral to Gastroenterology; Future    4. Generalized edema  Assessment & Plan:  Trace - +1 Pitting edema noted on lower extremities and generalized edema throughout body on physical exam. Will do a trial of lasix 20 mg once a day for 3 days. Instructions provided along with side effects. Return parameters discussed. Follow up in October    Orders:  -     furosemide (LASIX) 20 mg tablet; Take 1 tablet (20 mg total) by mouth daily for 5 days    5. Snoring  Assessment & Plan:  Snoring and witness apnea at night. Previously recommended sleep study, unable to complete. Provided referral for sleep medicine. Orders:  -     Ambulatory Referral to Sleep Medicine; Future    6. Obesity (BMI 30-39. 9)      Return in about 1 month (around 10/14/2023) for Annual physical in october . BMI Counseling: Body mass index is 38.91 kg/m². The BMI is above normal. Nutrition recommendations include decreasing portion sizes, encouraging healthy choices of fruits and vegetables, decreasing fast food intake, consuming healthier snacks, limiting drinks that contain sugar, moderation in carbohydrate intake, increasing intake of lean protein, reducing intake of saturated and trans fat and reducing intake of cholesterol. Exercise recommendations include moderate physical activity 150 minutes/week. Rationale for BMI follow-up plan is due to patient being overweight or obese. Depression Screening and Follow-up Plan: Patient was screened for depression during today's encounter. They screened negative with a PHQ-2 score of 0. Subjective      Ian Lane is a 37year old female here for diabetes management and concerns of generalized swelling. PMH of type 1 DM diagnosed at age 6, HLD, elevated BP, obesity. Review of Systems   Constitutional: Negative for chills, fever and unexpected weight change. HENT: Negative for congestion, rhinorrhea and sore throat. Eyes: Negative for visual disturbance. Respiratory: Negative for chest tightness, shortness of breath and wheezing. Cardiovascular: Negative for chest pain. Gastrointestinal: Negative for abdominal pain, constipation, diarrhea, nausea and vomiting. Endocrine: Negative for polyuria. Genitourinary: Negative for frequency. Skin: Negative for color change and rash. Neurological: Negative for dizziness, weakness, light-headedness and headaches. Psychiatric/Behavioral: Negative for confusion.        Current Outpatient Medications on File Prior to Visit   Medication Sig   • atorvastatin (LIPITOR) 40 mg tablet TAKE 1 TABLET BY MOUTH EVERY DAY   • B-D ULTRAFINE III SHORT PEN 31G X 8 MM MISC INJECT UNDER THE SKIN 4 (FOUR) TIMES A DAY   • ciclopirox (PENLAC) 8 % solution APPLY TO THE AFFECTED AREA(S) TOPICALLY ONCE DAILY PREFERABLY AT BEDTIME OR 8 HOURS BEFORE WASHING   • Continuous Blood Gluc  (Dexcom G6 ) POLA Use 1 Units continuous   • Continuous Blood Gluc  (FreeStyle [place] 2 [place]) POLA Use daily as directed   • Continuous Blood Gluc Sensor (Dexcom G6 Sensor) MISC Use 1 each every 10 days   • Continuous Blood Gluc Sensor (FreeStyle [place] 2 Sensor) MISC Change sensor every 14 days   • Continuous Blood Gluc Transmit (Dexcom G6 Transmitter) MISC 1 each every 3 months   • ergocalciferol (VITAMIN D2) 50,000 units TAKE 1 CAPSULE BY MOUTH ONE TIME PER WEEK   • insulin aspart (NovoLOG FlexPen) 100 UNIT/ML injection pen WITH EACH MEAL - INJECT 1 UNIT FOR 5 GRAMS OF CARBS + 1 UNIT FOR EVERY 25 MG/DL ABOUT 150 MG/DL. MAX DOSE OF 60 UNITS PER DAY. (Patient taking differently: WITH EACH MEAL - INJECT 1 UNIT FOR 5 GRAMS OF CARBS + 1 UNIT FOR EVERY 25 MG/DL ABOUT 150 MG/DL. MAX DOSE OF 60 UNITS PER DAY. Patient taking 60 units daily 09/14/23)   • insulin detemir (Levemir FlexPen) 100 Units/mL injection pen Inject 50 Units under the skin every 12 (twelve) hours   • Multiple Vitamins-Minerals (MULTIVITAMIN ADULT PO) Take by mouth   • Omega-3 Fatty Acids (fish oil) 1,000 mg Take 1,000 mg by mouth daily   • OneTouch Verio test strip USE 1 EACH 4 (FOUR) TIMES A DAY   • VITAMIN D PO Take 1,000 Units by mouth daily     • [DISCONTINUED] atorvastatin (LIPITOR) 40 mg tablet Take 40 mg by mouth daily (Patient not taking: Reported on 3/1/2023)   • [DISCONTINUED] Semaglutide-Weight Management (WEGOVY) 0.25 MG/0.5ML Inject 0.5 mL (0.25 mg total) under the skin once a week Use 0.25mg weekly for 4 weeks then 0.5mg weekly (Patient not taking: Reported on 9/14/2023)       Objective     /82 (BP Location: Left arm, Patient Position: Sitting, Cuff Size: Large)   Pulse 94   Temp (!) 97.3 °F (36.3 °C) (Temporal)   Resp 18   Ht 5' 11" (1.803 m)   Wt 127 kg (279 lb)   SpO2 97%   BMI 38.91 kg/m²     Physical Exam  Vitals and nursing note reviewed. Constitutional:       General: He is not in acute distress. Appearance: Normal appearance. He is obese. He is not ill-appearing, toxic-appearing or diaphoretic. HENT:      Head: Normocephalic and atraumatic. Right Ear: External ear normal.      Left Ear: External ear normal.      Nose: Nose normal.      Mouth/Throat:      Mouth: Mucous membranes are moist.   Eyes:      General: No scleral icterus. Right eye: No discharge. Left eye: No discharge. Extraocular Movements: Extraocular movements intact. Conjunctiva/sclera: Conjunctivae normal.   Cardiovascular:      Rate and Rhythm: Normal rate and regular rhythm. Pulses: Normal pulses. Heart sounds: Normal heart sounds. Pulmonary:      Effort: Pulmonary effort is normal.   Abdominal:      Palpations: Abdomen is soft. Tenderness: There is no abdominal tenderness. Musculoskeletal:         General: Swelling present. Cervical back: Normal range of motion. Right lower leg: Edema present. Left lower leg: Edema present. Skin:     General: Skin is warm. Capillary Refill: Capillary refill takes less than 2 seconds. Neurological:      General: No focal deficit present. Mental Status: He is alert and oriented to person, place, and time. Psychiatric:         Mood and Affect: Mood normal.         Behavior: Behavior normal.         Thought Content:  Thought content normal.       Austyn Murray MD

## 2023-09-14 NOTE — ASSESSMENT & PLAN NOTE
Uncontrolled on atorvastatin 40 mg daily. Continue current regimen and discussed diet and weight extensively.  Will follow up with lipid panel in 3 months    Lab Results   Component Value Date    CHOLESTEROL 254 (H) 05/31/2023    CHOLESTEROL 160 10/06/2022    CHOLESTEROL 260 (H) 04/01/2022     Lab Results   Component Value Date    HDL 50 05/31/2023    HDL 46 10/06/2022    HDL 35 (L) 04/01/2022     Lab Results   Component Value Date    TRIG 240 (H) 05/31/2023    TRIG 148 10/06/2022    TRIG 555 (H) 04/01/2022     Lab Results   Component Value Date    [address] 204 05/31/2023    [address] 114 10/06/2022    [address] 225 04/01/2022

## 2023-09-14 NOTE — ASSESSMENT & PLAN NOTE
Lab Results   Component Value Date    HGBA1C 8.8 (A) 09/14/2023     Uncontrolled on Levemir 50 units BID with most recent A1c of 8.8 from 10 in 6/2023. Previously followed endocrinologist, however, would like a new referral for someone within the local area. Pt reports fasting BG ranging between 150-200. Screenings:  - Last fundoscopy (yearly): completed over a year ago, no diabetic retinopathy.   - Last diabetic foot exam (yearly): completed <1 year ago neuropathy.   - Last U-microalb/Cr (yearly): completed on 6/2023, currently not ACEi/ARB. - Last ,  currently on atorvastatin 40 mg daily. Lab Results   Component Value Date    LDLCALC 156 (H) 05/31/2023       Current lifestyle:  - Diet: Poor daily. Discussed diabetic diet, continue to improve. Goal to reduce from daily fast food to 2/week. - Exercise: none routine. Discussed recommended exercise program. Goal 150min/week on stationary bike. Discussed importance of screening and follow ups. Discussed complications of DM and importance of compliance with medication. Goal   - A1c <7.0%  - BG preprandial , postprandial <180    Plan  • Continue Levemir 50 units BID, Novolog TID with meals. • Start Ozempic 0.25 mg weekly injection, pending prior authorization. • Follow up with specialists, podiatrist, endocrinologist, sleep medicine.     • Return in October for annual physical

## 2023-09-14 NOTE — ASSESSMENT & PLAN NOTE
Uncontrolled on insulin with most recent A1c of 8.8<10     Lab Results   Component Value Date    HGBA1C 8.8 (A) 09/14/2023        Pt reports fasting BG ranging between 150-200. Screenings:  - Last fundoscopy (yearly): completed on over a year ago, no diabetic retinopathy.   - Last diabetic foot exam (yearly): completed on __, __ diabetic neuropathy.   - Last U-microalb/Cr (yearly): completed on 6/2023, currently not ACEi/ARB. - Last   Lab Results   Component Value Date    LDLCALC 156 (H) 05/31/2023    currently on atorvastatin. Current lifestyle:  - Diet: discussed diabetic diet, continue to improve. Goal to reduce fast food. Drop to 2/7. - Exercise: discussed recommended exercise program. Goal 150min/week on stationary bike. Discussed importance of screening and follow ups. Discussed complications of DM and importance of compliance with medication. Goal   - A1c <7.0%  - BG preprandial , postprandial <180    Plan  • Continue  levemir 50 units at BID, Novolog TID with meals (averaging 60 units total day)  • Adjusted, will add ozempic weekly, pending authorization.   • Follow up with specialists, podiatrist, endocrinologist.   • Return for annual physical in October

## 2023-09-14 NOTE — ASSESSMENT & PLAN NOTE
Trace - +1 Pitting edema noted on lower extremities and generalized edema throughout body on physical exam. Will do a trial of lasix 20 mg once a day for 3 days. Instructions provided along with side effects. Return parameters discussed.  Follow up in October

## 2023-09-14 NOTE — ASSESSMENT & PLAN NOTE
Snoring and witness apnea at night. Previously recommended sleep study, unable to complete. Provided referral for sleep medicine.

## 2023-10-06 ENCOUNTER — TELEPHONE (OUTPATIENT)
Age: 43
End: 2023-10-06

## 2023-10-06 NOTE — TELEPHONE ENCOUNTER
Cover my meds called get correct information for Prior Auth for Patient  Had "Shereen" not "Otto" for the first name.     New prior auth initiated - Ref#[id number]

## 2023-10-10 ENCOUNTER — TELEPHONE (OUTPATIENT)
Age: 43
End: 2023-10-10

## 2023-11-20 ENCOUNTER — TELEPHONE (OUTPATIENT)
Age: 43
End: 2023-11-20

## 2023-11-20 NOTE — TELEPHONE ENCOUNTER
Spoke with pt about insurance and pt states insurance is not good currently and he is working on getting the problem resolved with insurance company   pt requested to cancel appt for now and he will call back to reschedule when the insurance is good

## 2023-12-06 DIAGNOSIS — E78.2 MIXED HYPERLIPIDEMIA: ICD-10-CM

## 2023-12-06 RX ORDER — ATORVASTATIN CALCIUM 40 MG/1
TABLET, FILM COATED ORAL
Qty: 90 TABLET | Refills: 1 | Status: SHIPPED | OUTPATIENT
Start: 2023-12-06

## 2024-01-02 DIAGNOSIS — E10.9 TYPE 1 DIABETES MELLITUS WITHOUT COMPLICATION (HCC): ICD-10-CM

## 2024-01-02 DIAGNOSIS — E10.65 TYPE 1 DIABETES MELLITUS WITH HYPERGLYCEMIA (HCC): ICD-10-CM

## 2024-01-03 DIAGNOSIS — E10.65 TYPE 1 DIABETES MELLITUS WITH HYPERGLYCEMIA (HCC): ICD-10-CM

## 2024-01-03 RX ORDER — INSULIN GLARGINE 100 [IU]/ML
INJECTION, SOLUTION SUBCUTANEOUS
Qty: 30 ML | Refills: 3 | Status: SHIPPED | OUTPATIENT
Start: 2024-01-03

## 2024-01-03 RX ORDER — PEN NEEDLE, DIABETIC 31 GX5/16"
NEEDLE, DISPOSABLE MISCELLANEOUS 4 TIMES DAILY
Qty: 100 EACH | Refills: 7 | Status: SHIPPED | OUTPATIENT
Start: 2024-01-03

## 2024-01-03 RX ORDER — INSULIN DETEMIR 100 [IU]/ML
50 INJECTION, SOLUTION SUBCUTANEOUS EVERY 12 HOURS SCHEDULED
Qty: 30 ML | Refills: 3 | Status: SHIPPED | OUTPATIENT
Start: 2024-01-03 | End: 2024-01-03

## 2024-01-10 ENCOUNTER — TELEPHONE (OUTPATIENT)
Dept: PSYCHIATRY | Facility: CLINIC | Age: 44
End: 2024-01-10

## 2024-01-10 NOTE — TELEPHONE ENCOUNTER
"Contacted patient off of Medication Management  to verify needs of services in attempts to offer patient an appointment at [facility] . Spoke w. Patient whom when asked to verify  patient responded \"excuse me\" and writer repeated question stating they are calling in regards to a wait list patient was placed on but due to dept writer is unable to disclose dept until  is fully verified. Patient stated \"do you know how crazy this sound in the day that we live in? But okay\" and proceeded to verify Month and date but not the Year of  writer asked patient did they feel comfortable disclosing the year then patient proceeded to state \" you now what whatever wait list it is I am on I will figure it out.\" And then line was disconnected not by writer.    Patient removed from list.   "

## 2024-01-15 DIAGNOSIS — E10.9 TYPE 1 DIABETES MELLITUS WITHOUT COMPLICATION (HCC): ICD-10-CM

## 2024-01-15 DIAGNOSIS — Z76.0 MEDICATION REFILL: ICD-10-CM

## 2024-01-16 RX ORDER — INSULIN ASPART 100 [IU]/ML
INJECTION, SOLUTION INTRAVENOUS; SUBCUTANEOUS
Qty: 75 ML | Refills: 1 | Status: SHIPPED | OUTPATIENT
Start: 2024-01-16 | End: 2024-01-17

## 2024-01-17 DIAGNOSIS — E10.65 TYPE 1 DIABETES MELLITUS WITH HYPERGLYCEMIA (HCC): Primary | ICD-10-CM

## 2024-01-17 RX ORDER — INSULIN LISPRO 100 [IU]/ML
INJECTION, SOLUTION INTRAVENOUS; SUBCUTANEOUS
Qty: 45 ML | Refills: 1 | Status: SHIPPED | OUTPATIENT
Start: 2024-01-17

## 2024-03-25 DIAGNOSIS — E55.9 VITAMIN D DEFICIENCY: ICD-10-CM

## 2024-03-26 RX ORDER — ERGOCALCIFEROL 1.25 MG/1
CAPSULE ORAL
Qty: 12 CAPSULE | Refills: 1 | Status: SHIPPED | OUTPATIENT
Start: 2024-03-26

## 2024-04-18 DIAGNOSIS — E55.9 VITAMIN D DEFICIENCY: ICD-10-CM

## 2024-04-19 RX ORDER — ERGOCALCIFEROL 1.25 MG/1
CAPSULE ORAL
Qty: 12 CAPSULE | Refills: 2 | Status: SHIPPED | OUTPATIENT
Start: 2024-04-19

## 2024-05-07 ENCOUNTER — OFFICE VISIT (OUTPATIENT)
Age: 44
End: 2024-05-07
Payer: COMMERCIAL

## 2024-05-07 VITALS
DIASTOLIC BLOOD PRESSURE: 78 MMHG | SYSTOLIC BLOOD PRESSURE: 120 MMHG | HEART RATE: 108 BPM | OXYGEN SATURATION: 98 % | HEIGHT: 71 IN | TEMPERATURE: 95.6 F | BODY MASS INDEX: 39.59 KG/M2 | WEIGHT: 282.8 LBS

## 2024-05-07 DIAGNOSIS — E10.65 TYPE 1 DIABETES MELLITUS WITH HYPERGLYCEMIA (HCC): Primary | ICD-10-CM

## 2024-05-07 DIAGNOSIS — R06.02 SOB (SHORTNESS OF BREATH) ON EXERTION: ICD-10-CM

## 2024-05-07 DIAGNOSIS — E55.9 VITAMIN D INSUFFICIENCY: ICD-10-CM

## 2024-05-07 DIAGNOSIS — R06.83 SNORING: ICD-10-CM

## 2024-05-07 DIAGNOSIS — Z12.5 PROSTATE CANCER SCREENING: ICD-10-CM

## 2024-05-07 DIAGNOSIS — Z12.11 SCREENING FOR COLORECTAL CANCER: ICD-10-CM

## 2024-05-07 DIAGNOSIS — Z12.11 SCREENING FOR COLON CANCER: ICD-10-CM

## 2024-05-07 DIAGNOSIS — R60.1 GENERALIZED EDEMA: ICD-10-CM

## 2024-05-07 DIAGNOSIS — E78.2 MIXED HYPERLIPIDEMIA: ICD-10-CM

## 2024-05-07 DIAGNOSIS — E66.9 OBESITY (BMI 30-39.9): ICD-10-CM

## 2024-05-07 DIAGNOSIS — Z76.89 ENCOUNTER FOR WEIGHT MANAGEMENT: ICD-10-CM

## 2024-05-07 DIAGNOSIS — Z12.12 SCREENING FOR COLORECTAL CANCER: ICD-10-CM

## 2024-05-07 DIAGNOSIS — E55.9 INADEQUATE VITAMIN D AND VITAMIN D DERIVATIVE INTAKE: ICD-10-CM

## 2024-05-07 PROCEDURE — 99396 PREV VISIT EST AGE 40-64: CPT | Performed by: FAMILY MEDICINE

## 2024-05-07 PROCEDURE — 99214 OFFICE O/P EST MOD 30 MIN: CPT | Performed by: FAMILY MEDICINE

## 2024-05-07 RX ORDER — PHENTERMINE HYDROCHLORIDE 37.5 MG/1
37.5 CAPSULE ORAL EVERY MORNING
Qty: 30 CAPSULE | Refills: 0 | Status: SHIPPED | OUTPATIENT
Start: 2024-05-07

## 2024-05-07 NOTE — PATIENT INSTRUCTIONS
Together as a team our goal is to practice preventative care, avoid chronic diseases, and/or avoid further progression of current chronic diseases.   Here are my recommendations as we discussed during our office visit:    Exercise:  150 minutes of moderate intensity workout for overall general health  200-300 minutes of moderate intensity workout for weight loss.   The first 30 minutes of exercising, the body will take energy from what we ate that day. Then after that 30-minute rosa, the body will take energy from the stored fats.  Therefore, it will be more beneficial to do longer sessions and less frequently in a week to help with our weight loss journey.  I would recommend 60-minute sessions 4 times a week   Strength training 2 times a week for good bone health    Nutritional intake (diet):  Remember, it is not about finding a diet to lose weight quickly, rather adjusting your lifestyle for healthy living long-term.   When we build good habits, it does not become difficult to maintain it.  Focus on a low-carb diet.  The best diet is Mediterranean diet, which is a low-carb diet.  There are good carbs and bad carbs, minimize the bad carbs.    Bad carbs: Refined carbohydrates or simple carbohydrates that have been processed and stripped of their natural fiber and nutrients.          These carbohydrates can lead to rapid spikes in blood sugar levels and are often associated with low nutritional value. The big 4: Bread, Rice, Pasta, Potatoes  Refined grains-white bread, white rice, pasta made from refined flour.  Sugary foods and beverages: Candy, pastries, sugary cereals, soda, and other sugary drinks.  Processed snacks: Chips, cookies, sugary granola bars  Sweetened breakfast cereals: Cereals with added sugars.  Sweets and desserts: Cakes, ice cream, pies  Good carbs: These are referred to complex carbohydrates that are unprocessed or minimally processed, providing more nutrients.  Here examples of good  carbs:  Whole grains: Brown rice, quinoa, oats, whole-wheat products   Legumes: Lentils, chickpeas, black beans, kidney beans  Starchy vegetables: Sweet potatoes, butternut squash  Whole fruits: Berries, apples, oranges, bananas  Vegetables: Broccoli, spinach, kale, Donnelly sprouts  Nuts and seeds: Almonds, walnuts, Samuel seeds, flaxseeds.    Focus on eating whole foods.  What are whole foods?  Whole Foods refer to natural, unprocessed, or minimally processed foods that are as close to the original form as possible.  These foods are in their natural state and have undergone little to no refined or alteration.  Whole Foods are valued for their nutrient density, providing essential vitamins, minerals, fiber, and other beneficial compounds.  Examples of whole foods include:  Fruits and vegetables without added preservatives or processing.    Whole grains-unrefined grains like brown rice, quinoa, and whole-wheat, which retain their brain, germ, and endosperm.  Legumes-beans, lentils, and peas in their national unprocessed date.  Nuts and seeds-on roasted, unsalted nuts and seeds without added oils or seasonings.  Meat and fish-fresh, unprocessed meats and fish without additives or preservatives.  Dairy-unprocessed dairy products such as milk, yogurt, and cheese without added sugars or artificial ingredients.  Eggs-eggs in their natural form without additives.    Protein requirement  Calculate your protein requirement in grams by multiplying your weight in kilograms by the recommended protein intake per kilogram.  This gives you an estimate of your daily protein requirement.  Age 15-18: 0.9 grams of protein per kilogram of body weight from male gender, 0.9 g of protein per kilogram of body weight for female gender.  Age 19+: 0.8 g of protein per kilogram of body weight for both male and female gender.  If you are physically active or trying to build muscle: 1.2-2.2 g/kg  Example: if you weigh 70 kg, to calculate your  protein intake per day multiply 70 by 0.8 = 56 grams per day  To convert your weight to kilograms from pounds: Take your weight in pounds and divided by 2.2046 to get your weight to kilograms.    Sodium/salt  Compare sodium in foods like soup, bread, frozen and packaged meals, then choose the one with lower numbers.  Most Americans should limit their sodium intake to less than 2,300 mg/day.  All -Americans, people with diabetes, high blood pressure, kidney disease, should limit their sodium intake to 1,500 mg/day.    Cooking oil  Avoid the following oil for cooking: Canola, corn, vegetable, sunflower, peanut, sesame, grapeseed, flaxseed.  Even though it states it is heart healthy, however, they are significantly processed and refined.   Would recommend instead the following cooking oil: Olive oil, coconut oil, avocado oil, ghee, butter.    Intermittent fasting:   There are several benefits of intermittent fasting including weight loss, improving insulin sensitivity, improving cardiovascular health by reducing risk factors like blood pressure, cholesterol levels, and triglycerides. It also promotes brain health, longevity, reduction in inflammatory markers, improves metabolic health, and some studies even suggest intermittent fasting to be protective against certain types of cancers.     The goal of intermittent fasting is to shutdown the insulin hormone, as we discussed, which is a hormone that negatively affects our health when it is around in high levels chronically.     Start intermittent fasting for 14 hours initially, then increase to 16 hours, with a goal to reach 18 hours.  During fasting - may drink water without any additives such as sweeteners, black coffee, tea without any additives including milk.   Eat nutritious meals 2 times a day  Avoid snacking in between meals.  If you end up snacking, snack on fruits/vegetables.  Initially it might be difficult, however, over time you will notice a positive  change in your energy, mood, and weight.

## 2024-05-07 NOTE — PROGRESS NOTES
[facility]  [address] [place]    Name: Otto Altman      YOB: 1980      MRN: [id number]  Encounter Provider: Alexis Edge MD      Encounter Date: 05/07/24      ASSESSMENT & PLAN      Alcohol/drug use: discussed moderation in alcohol intake, the recommendations for healthy alcohol use, and avoidance of illicit drug use.  Dental Health: discussed importance of regular tooth brushing, flossing, and dental visits.  Injury prevention: discussed safety/seat belts, safety helmets, smoke detectors, carbon dioxide detectors, and smoking near bedding or upholstery.  Sexual health: discussed sexually transmitted diseases, partner selection, use of condoms, avoidance of unintended pregnancy, and contraceptive alternatives.  Exercise: the importance of regular exercise/physical activity was discussed. Recommend exercise 3-5 times per week for at least 30 minutes.          Assessment/Plan      Healthcare Maintenance  Health maintenance completed today.  - Medical history reviewed, including existing medical conditions, medications, and surgeries.   - Labs discussed to evaluate cholesterol, blood sugar, kidney function, liver function, and other important markers of health.  - BMI evaluated and discussed.  - Lifestyle and health counseling completed including diet, exercise habits, smoking status, alcohol consumption.   - Bone & Heart health reviewed  - Cancer screenings discussed: CT lung/prostate/colonoscopy.   - Vaccination status reviewed and pertinent immunizations and booster shots discussed.  - Skin examination: Discussed importance of sunscreen and other preventative measures for skin cancer.  - Mental health and wellbeing evaluated and discussed.  - Family history obtained to identify any of hereditary health risks.   Lab orders in place as discussed  Start/continue preventative measures as  "discussed/advised  Complete preventative orders in place as recommended.   Refer to screenings problem list  Return in 4 weeks after completion of labs.    Screenings  Colonoscopy Not on file Not on file  Cologuard/FIT Not on file   Prostate No results found for: \"PSA\"  Due for colonoscopy and PSA, discussed importance of screenings.    Type 1 diabetes mellitus with hyperglycemia  Uncontrolled type 1 diabetes, previously followed in endocrinology, change of insurance has not seen the endocrinologist recently.  Currently on Lantus 50 units twice daily and Humalog with meals for carb correction  Discussed extensively importance of nutritional improvement and weight loss, patient is very discouraged with weight.  Recommend follow-up with endocrinology  Follow-up with blood work, return in 4 weeks after completion of labs  Refer to podiatry and ophthalmology    Weight Management/obesity, class II, BMI 35-39.9  Wt Readings from Last 3 Encounters:   05/07/24 128 kg (282 lb 12.8 oz)   09/14/23 127 kg (279 lb)   06/01/23 120 kg (264 lb)     Initial weight (05/07/24): 282 lbs, Body mass index is 39.44 kg/m².  05/07/24 Body mass index is 39.44 kg/m².  TWL: - lbs  Struggling to lose weight, weight changes over the last 12  months  Discussed importance of low-carb diet.   Discussed exercise recommendation of 200-300 minutes per week, longer sessions would be rec.  Start phentermine 37.5 mg daily early in the morning, avoid taking after 12 noon due to side effect of insomnia.  Will provide assistance with decreasing appetite to better assist with weight loss.  If noticing increase in heart rate, anxiety, discontinue.  Work on low-carb diet, limit snacking, exercise regularly 200-300 minutes/week as discussed  Return in 4 weeks after completion of labs for continued care     Shortness of breath on exertion, generalized edema, snoring  Patient would like to see a cardiology and previously referred to neurology for evaluation of " obstructive sleep apnea, patient has not made an appointment.  Provided phone number for pulmonology to schedule an appointment for evaluation and management of obstructive sleep apnea  Referral in place for cardiology, complete echocardiogram-order in place.          DIAGNOSIS & ORDERS     Diagnoses and all orders for this visit:    Type 1 diabetes mellitus with hyperglycemia (HCC)  -     Cancel: POCT hemoglobin A1c  -     IRIS Diabetic eye exam  -     Basic metabolic panel; Future  -     Ambulatory Referral to Podiatry; Future  -     Ambulatory Referral to Ophthalmology; Future  -     Lipid Panel with Direct LDL reflex; Future  -     Albumin / creatinine urine ratio; Future    Screening for colon cancer  -     Cancel: Ambulatory Referral to Gastroenterology; Future    Vitamin D insufficiency  -     Vitamin D 25 hydroxy; Future    Inadequate vitamin D and vitamin D derivative intake  -     Vitamin D 25 hydroxy; Future    Prostate cancer screening  -     PSA, Total Screen; Future    Screening for colorectal cancer  -     Ambulatory referral to Gastroenterology; Future    Mixed hyperlipidemia  -     CBC and differential; Future  -     Comprehensive metabolic panel; Future  -     TSH, 3rd generation with Free T4 reflex; Future    Encounter for weight management  -     phentermine 37.5 MG capsule; Take 1 capsule (37.5 mg total) by mouth every morning    SOB (shortness of breath) on exertion  -     Ambulatory Referral to Cardiology; Future  -     Echo complete w/ contrast if indicated; Future    Obesity (BMI 30-39.9)    Snoring    Generalized edema            FOLLOW-UP PLANS   Return in about 4 weeks (around 6/4/2024) for after completion of labs.        Subjective:     Otto Altman is a 44 y.o. who is here for annual physical exam.    Otto Altman reports living with wife (who is a live-in home attendant), son and dog.   Eduction/Work: driving, .           Concerns today: No    Diet and Physical  Activity  Diet: poor diet  Exercise: no formal exercise  Do you struggle with your weight? Yes, describe: has maintained weight at the high level.     General Health  Sleep: gets 4-6 hours of sleep on average and snores loudly   Hearing: normal - bilateral  Vision: wears glasses  Dental: no dental visits for >1 year, brushes teeth twice daily, and flosses teeth occasionally    Mental Health  PHQ-2/9 Depression Screening           Anxiety: No  History of SI/SH: No  Significant past trauma that has impacted patient's mental health: Yes, describe: yes and no     Health  Urinary symptoms: none  Sexually Active: Yes   single partner, contraception - none    Smoking/Alcohol Use:  Smoking Cig: No  Vaping: No  Recreational drugs: No  Alcohol consumption: Yes, describe: occasionally     Review of Systems   Constitutional:  Negative for chills, fever and unexpected weight change.   HENT:  Negative for congestion, rhinorrhea and sore throat.    Eyes:  Negative for visual disturbance.   Respiratory:  Negative for chest tightness, shortness of breath and wheezing.    Cardiovascular:  Negative for chest pain.   Gastrointestinal:  Negative for abdominal pain, constipation, diarrhea, nausea and vomiting.   Endocrine: Negative for polyuria.   Genitourinary:  Negative for dysuria and hematuria.   Skin:  Negative for rash.   Neurological:  Negative for dizziness, weakness, light-headedness and headaches.   Psychiatric/Behavioral:  Negative for confusion.        Allergies:   No Known Allergies     Social history:   Social Determinants of Health     Tobacco Use: Low Risk  (5/7/2024)    Patient History     Smoking Tobacco Use: Never     Smokeless Tobacco Use: Never     Passive Exposure: Not on file   Alcohol Use: Not At Risk (3/29/2021)    AUDIT-C     Frequency of Alcohol Consumption: Monthly or less     Average Number of Drinks: 1 or 2     Frequency of Binge Drinking: Never   Financial Resource Strain: Not on file   Food Insecurity:  Not on file   Transportation Needs: Not on file   Physical Activity: Inactive (3/29/2021)    Exercise Vital Sign     Days of Exercise per Week: 0 days     Minutes of Exercise per Session: 0 min   Stress: Stress Concern Present (9/14/2023)    [employer] - Occupational Stress Questionnaire     Feeling of Stress : To some extent   Social Connections: Not on file   Intimate Partner Violence: Not on file   Depression: Not at risk (9/14/2023)    PHQ-2     PHQ-2 Score: 0   Housing Stability: Not on file   Utilities: Not on file   Health Literacy: Not on file        Surgical history:   Past Surgical History:   Procedure Laterality Date    NO PAST SURGERIES          Family history  Family History   Problem Relation Age of Onset    Diabetes Mother     Diabetes Father           Current Medication List:     Current Outpatient Medications:     atorvastatin (LIPITOR) 40 mg tablet, TAKE 1 TABLET BY MOUTH EVERY DAY, Disp: 90 tablet, Rfl: 1    B-D ULTRAFINE III SHORT PEN 31G X 8 MM MISC, INJECT UNDER THE SKIN 4 (FOUR) TIMES A DAY, Disp: 100 each, Rfl: 7    ciclopirox (PENLAC) 8 % solution, APPLY TO THE AFFECTED AREA(S) TOPICALLY ONCE DAILY PREFERABLY AT BEDTIME OR 8 HOURS BEFORE WASHING, Disp: , Rfl:     ergocalciferol (VITAMIN D2) 50,000 units, TAKE 1 CAPSULE BY MOUTH ONE TIME PER WEEK, Disp: 12 capsule, Rfl: 2    insulin lispro (HumaLOG KwikPen) 100 units/mL injection pen, Use 1u/5gm carb plus 1u/25mg/dL above 120mg/dL; max 60u/day, Disp: 45 mL, Rfl: 1    Lantus SoloStar 100 units/mL SOPN, Inject 50 units under the skin every 12 hours, Disp: 30 mL, Rfl: 3    Multiple Vitamins-Minerals (MULTIVITAMIN ADULT PO), Take by mouth, Disp: , Rfl:     Omega-3 Fatty Acids (fish oil) 1,000 mg, Take 1,000 mg by mouth daily, Disp: , Rfl:     OneTouch Verio test strip, USE 1 EACH 4 (FOUR) TIMES A DAY, Disp: 100 strip, Rfl: 9    phentermine 37.5 MG capsule, Take 1 capsule (37.5 mg total) by mouth every morning, Disp:  30 capsule, Rfl: 0    VITAMIN D PO, Take 1,000 Units by mouth daily  , Disp: , Rfl:     Continuous Blood Gluc  (Dexcom G6 ) POLA, Use 1 Units continuous (Patient not taking: Reported on 5/7/2024), Disp: 1 each, Rfl: 0    Continuous Blood Gluc  (FreeStyle Demond 2 [place]) POLA, Use daily as directed (Patient not taking: Reported on 5/7/2024), Disp: 1 each, Rfl: 0    Continuous Blood Gluc Sensor (Dexcom G6 Sensor) MISC, Use 1 each every 10 days (Patient not taking: Reported on 5/7/2024), Disp: 3 each, Rfl: 5    Continuous Blood Gluc Sensor (FreeStyle Demond 2 Sensor) MISC, Change sensor every 14 days (Patient not taking: Reported on 5/7/2024), Disp: 6 each, Rfl: 1    Continuous Blood Gluc Transmit (Dexcom G6 Transmitter) MISC, 1 each every 3 months (Patient not taking: Reported on 5/7/2024), Disp: 1 each, Rfl: 1    furosemide (LASIX) 20 mg tablet, Take 1 tablet (20 mg total) by mouth daily for 5 days, Disp: 5 tablet, Rfl: 0      Objective:     Vitals:    05/07/24 0922   BP: 120/78   Pulse: (!) 108   Temp: (!) 95.6 °F (35.3 °C)   SpO2: 98%       Physical Exam  Vitals reviewed.   Constitutional:       General: He is not in acute distress.     Appearance: Normal appearance. He is obese. He is not ill-appearing, toxic-appearing or diaphoretic.   HENT:      Head: Normocephalic and atraumatic.      Right Ear: External ear normal.      Left Ear: External ear normal.      Nose: Nose normal.      Mouth/Throat:      Mouth: Mucous membranes are moist.   Eyes:      General: No scleral icterus.        Right eye: No discharge.         Left eye: No discharge.      Extraocular Movements: Extraocular movements intact.      Conjunctiva/sclera: Conjunctivae normal.   Cardiovascular:      Rate and Rhythm: Regular rhythm.      Pulses: Normal pulses. no weak pulses.           Dorsalis pedis pulses are 2+ on the right side and 2+ on the left side.        Posterior tibial pulses are 2+ on the right side and 2+ on the  left side.      Heart sounds: Normal heart sounds.   Pulmonary:      Effort: Pulmonary effort is normal. No respiratory distress.      Breath sounds: Normal breath sounds.   Abdominal:      Palpations: Abdomen is soft.      Tenderness: There is no abdominal tenderness.   Musculoskeletal:         General: No swelling. Normal range of motion.      Cervical back: Normal range of motion.   Skin:     General: Skin is warm and dry.   Neurological:      General: No focal deficit present.      Mental Status: He is alert and oriented to person, place, and time.   Psychiatric:         Mood and Affect: Mood normal.         Behavior: Behavior normal.         Thought Content: Thought content normal.           Alexis Edge MD  Family Medicine Physician   [facility] [place]       Diabetic Foot Exam    Patient's shoes and socks removed.    Right Foot/Ankle   Right Foot Inspection      Sensory   Proprioception: diminished  Monofilament testing: diminished    Vascular  The right DP pulse is 2+. The right PT pulse is 2+.     Left Foot/Ankle  Left Foot Inspection      Sensory   Proprioception: diminished  Monofilament testing: diminished    Vascular  The left DP pulse is 2+. The left PT pulse is 2+.     Assign Risk Category  No deformity present  No loss of protective sensation  No weak pulses  Risk: 0

## 2024-05-12 DIAGNOSIS — E55.9 VITAMIN D DEFICIENCY: ICD-10-CM

## 2024-05-14 RX ORDER — ERGOCALCIFEROL 1.25 MG/1
CAPSULE ORAL
Qty: 12 CAPSULE | Refills: 3 | Status: SHIPPED | OUTPATIENT
Start: 2024-05-14

## 2024-06-09 DIAGNOSIS — E10.65 TYPE 1 DIABETES MELLITUS WITH HYPERGLYCEMIA (HCC): ICD-10-CM

## 2024-06-09 RX ORDER — INSULIN LISPRO 100 [IU]/ML
INJECTION, SOLUTION INTRAVENOUS; SUBCUTANEOUS
Qty: 15 ML | Refills: 5 | Status: SHIPPED | OUTPATIENT
Start: 2024-06-09

## 2024-06-17 ENCOUNTER — OFFICE VISIT (OUTPATIENT)
Age: 44
End: 2024-06-17
Payer: COMMERCIAL

## 2024-06-17 ENCOUNTER — APPOINTMENT (OUTPATIENT)
Age: 44
End: 2024-06-17
Payer: COMMERCIAL

## 2024-06-17 VITALS
OXYGEN SATURATION: 98 % | WEIGHT: 270 LBS | HEIGHT: 71 IN | HEART RATE: 108 BPM | BODY MASS INDEX: 37.8 KG/M2 | RESPIRATION RATE: 18 BRPM | TEMPERATURE: 98.4 F | SYSTOLIC BLOOD PRESSURE: 136 MMHG | DIASTOLIC BLOOD PRESSURE: 98 MMHG

## 2024-06-17 DIAGNOSIS — E55.9 VITAMIN D INSUFFICIENCY: ICD-10-CM

## 2024-06-17 DIAGNOSIS — Z76.89 ENCOUNTER FOR WEIGHT MANAGEMENT: ICD-10-CM

## 2024-06-17 DIAGNOSIS — E66.9 OBESITY (BMI 30-39.9): ICD-10-CM

## 2024-06-17 DIAGNOSIS — Z12.5 PROSTATE CANCER SCREENING: ICD-10-CM

## 2024-06-17 DIAGNOSIS — E10.65 TYPE 1 DIABETES MELLITUS WITH HYPERGLYCEMIA (HCC): ICD-10-CM

## 2024-06-17 DIAGNOSIS — E78.2 MIXED HYPERLIPIDEMIA: ICD-10-CM

## 2024-06-17 DIAGNOSIS — E55.9 INADEQUATE VITAMIN D AND VITAMIN D DERIVATIVE INTAKE: ICD-10-CM

## 2024-06-17 DIAGNOSIS — E10.65 TYPE 1 DIABETES MELLITUS WITH HYPERGLYCEMIA (HCC): Primary | ICD-10-CM

## 2024-06-17 DIAGNOSIS — R60.1 GENERALIZED EDEMA: ICD-10-CM

## 2024-06-17 LAB
25(OH)D3 SERPL-MCNC: 33.1 NG/ML (ref 30–100)
ALBUMIN SERPL BCP-MCNC: 4.2 G/DL (ref 3.5–5)
ALP SERPL-CCNC: 76 U/L (ref 34–104)
ALT SERPL W P-5'-P-CCNC: 26 U/L (ref 7–52)
ANION GAP SERPL CALCULATED.3IONS-SCNC: 9 MMOL/L (ref 4–13)
AST SERPL W P-5'-P-CCNC: 18 U/L (ref 13–39)
BASOPHILS # BLD AUTO: 0.02 THOUSANDS/ÂΜL (ref 0–0.1)
BASOPHILS NFR BLD AUTO: 0 % (ref 0–1)
BILIRUB SERPL-MCNC: 0.44 MG/DL (ref 0.2–1)
BUN SERPL-MCNC: 14 MG/DL (ref 5–25)
CALCIUM SERPL-MCNC: 9.8 MG/DL (ref 8.4–10.2)
CHLORIDE SERPL-SCNC: 104 MMOL/L (ref 96–108)
CHOLEST SERPL-MCNC: 243 MG/DL
CO2 SERPL-SCNC: 30 MMOL/L (ref 21–32)
CREAT SERPL-MCNC: 0.98 MG/DL (ref 0.6–1.3)
CREAT UR-MCNC: 240.9 MG/DL
EOSINOPHIL # BLD AUTO: 0.1 THOUSAND/ÂΜL (ref 0–0.61)
EOSINOPHIL NFR BLD AUTO: 2 % (ref 0–6)
ERYTHROCYTE [DISTWIDTH] IN BLOOD BY AUTOMATED COUNT: 13.3 % (ref 11.6–15.1)
GFR SERPL CREATININE-BSD FRML MDRD: 92 ML/MIN/1.73SQ M
GLUCOSE P FAST SERPL-MCNC: 82 MG/DL (ref 65–99)
HCT VFR BLD AUTO: 46.5 % (ref 36.5–49.3)
HDLC SERPL-MCNC: 42 MG/DL
HGB BLD-MCNC: 15.4 G/DL (ref 12–17)
IMM GRANULOCYTES # BLD AUTO: 0.01 THOUSAND/UL (ref 0–0.2)
IMM GRANULOCYTES NFR BLD AUTO: 0 % (ref 0–2)
LDLC SERPL CALC-MCNC: 160 MG/DL (ref 0–100)
LYMPHOCYTES # BLD AUTO: 2.01 THOUSANDS/ÂΜL (ref 0.6–4.47)
LYMPHOCYTES NFR BLD AUTO: 39 % (ref 14–44)
MCH RBC QN AUTO: 28.8 PG (ref 26.8–34.3)
MCHC RBC AUTO-ENTMCNC: 33.1 G/DL (ref 31.4–37.4)
MCV RBC AUTO: 87 FL (ref 82–98)
MICROALBUMIN UR-MCNC: 67.7 MG/L
MICROALBUMIN/CREAT 24H UR: 28 MG/G CREATININE (ref 0–30)
MONOCYTES # BLD AUTO: 0.47 THOUSAND/ÂΜL (ref 0.17–1.22)
MONOCYTES NFR BLD AUTO: 9 % (ref 4–12)
NEUTROPHILS # BLD AUTO: 2.6 THOUSANDS/ÂΜL (ref 1.85–7.62)
NEUTS SEG NFR BLD AUTO: 50 % (ref 43–75)
NRBC BLD AUTO-RTO: 0 /100 WBCS
PLATELET # BLD AUTO: 205 THOUSANDS/UL (ref 149–390)
PMV BLD AUTO: 12.2 FL (ref 8.9–12.7)
POTASSIUM SERPL-SCNC: 4.2 MMOL/L (ref 3.5–5.3)
PROT SERPL-MCNC: 7.7 G/DL (ref 6.4–8.4)
PSA SERPL-MCNC: 0.7 NG/ML (ref 0–4)
RBC # BLD AUTO: 5.34 MILLION/UL (ref 3.88–5.62)
SODIUM SERPL-SCNC: 143 MMOL/L (ref 135–147)
TRIGL SERPL-MCNC: 206 MG/DL
TSH SERPL DL<=0.05 MIU/L-ACNC: 1.9 UIU/ML (ref 0.45–4.5)
WBC # BLD AUTO: 5.21 THOUSAND/UL (ref 4.31–10.16)

## 2024-06-17 PROCEDURE — G0103 PSA SCREENING: HCPCS

## 2024-06-17 PROCEDURE — 99214 OFFICE O/P EST MOD 30 MIN: CPT | Performed by: FAMILY MEDICINE

## 2024-06-17 PROCEDURE — 85025 COMPLETE CBC W/AUTO DIFF WBC: CPT

## 2024-06-17 PROCEDURE — 80061 LIPID PANEL: CPT

## 2024-06-17 PROCEDURE — 82043 UR ALBUMIN QUANTITATIVE: CPT

## 2024-06-17 PROCEDURE — 82306 VITAMIN D 25 HYDROXY: CPT

## 2024-06-17 PROCEDURE — 36415 COLL VENOUS BLD VENIPUNCTURE: CPT

## 2024-06-17 PROCEDURE — 84443 ASSAY THYROID STIM HORMONE: CPT

## 2024-06-17 PROCEDURE — 80053 COMPREHEN METABOLIC PANEL: CPT

## 2024-06-17 PROCEDURE — 82570 ASSAY OF URINE CREATININE: CPT

## 2024-06-17 RX ORDER — PHENTERMINE HYDROCHLORIDE 15 MG/1
15 CAPSULE ORAL EVERY MORNING
Qty: 30 CAPSULE | Refills: 0 | Status: SHIPPED | OUTPATIENT
Start: 2024-06-17

## 2024-06-17 NOTE — PROGRESS NOTES
[facility]  Ambulatory visit     Name: Otto Altman   YOB: 1980   MRN: [id number]  Encounter Provider: Alexis Edge MD    Encounter Date: 6/17/2024    ASSESSMENT & PLAN    Assessment & Plan     Type 1 diabetes mellitus with hyperglycemia  Uncontrolled type 1 diabetes, previously followed in endocrinology, change of insurance has not seen the endocrinologist recently.    Currently on Lantus 50 units twice daily and Humalog with meals for carb correction  Discussed extensively importance of nutritional improvement and weight loss, patient is very discouraged with weight.  Recommend follow-up with endocrinology, still needs to make an apt with endocrinology, will call today.   Follow-up with blood work, still pending. Return in 3 months.   Previously referred to ophthalmology and podiatry, has scheduled appointment for podiatry this month.     Weight Management/obesity, class II, BMI 35-39.9  Wt Readings from Last 3 Encounters:   06/17/24 122 kg (270 lb)   05/07/24 128 kg (282 lb 12.8 oz)   09/14/23 127 kg (279 lb)   Initial weight (05/07/24): 282 lbs, Body mass index is 39.44 kg/m².  05/07/24 Body mass index is 39.44 kg/m².  TWL: -12lbs  Previously start phentermine 37.5 mg daily early in the morning, avoid taking after 12 noon due to side effect of insomnia.  Has lost about 12 pounds since start of phentermine.  Taking 3 times a week, tapering down.  Has noted increase in heart rate/palpitations since start of phentermine.  Decreased phentermine to 15 mg capsules, continue to take daily.  Continue to work on habit changes as discussed.  Return in 3 months for close monitoring.  Call to request refill.  Work on low-carb diet, limit snacking, exercise regularly 200-300 minutes/week as discussed     Shortness of breath on exertion, generalized edema, snoring  Previously referred to cardiology and pulmonology, patient has not made an appointment with them yet but reports  having their phone number and will call.  Previously ordered echocardiogram, order in place, needs to be scheduled.    Generalized edema  Chronic and ongoing, noted triggers is insufficient sleep  Referral placed for PT/OT lymphedema therapy.            Depression Screening and Follow-up Plan: Patient was screened for depression during today's encounter. They screened negative with a PHQ-2 score of 0.       DIAGNOSIS & ORDERS   1. Type 1 diabetes mellitus with hyperglycemia (HCC)  2. Obesity (BMI 30-39.9)  3. Mixed hyperlipidemia  4. Generalized edema  -     Ambulatory Referral to PT/OT Lymphedema Therapy; Future  5. Encounter for weight management  -     phentermine 15 MG capsule; Take 1 capsule (15 mg total) by mouth every morning    FOLLOW-UP PLANS   Return in about 3 months (around 9/17/2024) for diabetes management, after completion of labs.    Current Medication List:     Current Outpatient Medications:     atorvastatin (LIPITOR) 40 mg tablet, TAKE 1 TABLET BY MOUTH EVERY DAY, Disp: 90 tablet, Rfl: 1    B-D ULTRAFINE III SHORT PEN 31G X 8 MM MISC, INJECT UNDER THE SKIN 4 (FOUR) TIMES A DAY, Disp: 100 each, Rfl: 7    ciclopirox (PENLAC) 8 % solution, APPLY TO THE AFFECTED AREA(S) TOPICALLY ONCE DAILY PREFERABLY AT BEDTIME OR 8 HOURS BEFORE WASHING, Disp: , Rfl:     ergocalciferol (VITAMIN D2) 50,000 units, TAKE 1 CAPSULE BY MOUTH ONE TIME PER WEEK, Disp: 12 capsule, Rfl: 3    insulin lispro (HumaLOG) 100 units/mL injection pen, USE 1U/5GM CARB PLUS 1U/25MG/DL ABOVE 120MG/DL MAX 60U/DAY, Disp: 15 mL, Rfl: 5    Lantus SoloStar 100 units/mL SOPN, Inject 50 units under the skin every 12 hours, Disp: 30 mL, Rfl: 3    Multiple Vitamins-Minerals (MULTIVITAMIN ADULT PO), Take by mouth, Disp: , Rfl:     Omega-3 Fatty Acids (fish oil) 1,000 mg, Take 1,000 mg by mouth daily, Disp: , Rfl:     OneTouch Verio test strip, USE 1 EACH 4 (FOUR) TIMES A DAY, Disp: 100 strip, Rfl: 9    phentermine 15 MG capsule, Take 1 capsule (15  "mg total) by mouth every morning, Disp: 30 capsule, Rfl: 0    VITAMIN D PO, Take 1,000 Units by mouth daily  , Disp: , Rfl:     furosemide (LASIX) 20 mg tablet, Take 1 tablet (20 mg total) by mouth daily for 5 days, Disp: 5 tablet, Rfl: 0  Subjective   History of Present Illness       Otto Altman is here for an office visit.   Otto Altman reports living with wife (who is a live-in home attendant), son and dog.   Eduction/Work: driving, .           Review of Systems    Objective:     /98 (BP Location: Left arm, Patient Position: Sitting, Cuff Size: Large)   Pulse (!) 108   Temp 98.4 °F (36.9 °C) (Tympanic)   Resp 18   Ht 5' 11\" (1.803 m)   Wt 122 kg (270 lb)   SpO2 98%   BMI 37.66 kg/m²      Physical Exam  Vitals reviewed.   Constitutional:       General: He is not in acute distress.     Appearance: Normal appearance. He is obese. He is not ill-appearing, toxic-appearing or diaphoretic.   HENT:      Head: Normocephalic and atraumatic.      Right Ear: External ear normal.      Left Ear: External ear normal.      Nose: No congestion or rhinorrhea.   Eyes:      General: No scleral icterus.        Right eye: No discharge.         Left eye: No discharge.      Conjunctiva/sclera: Conjunctivae normal.   Cardiovascular:      Rate and Rhythm: Normal rate.   Pulmonary:      Effort: Pulmonary effort is normal. No respiratory distress.   Neurological:      General: No focal deficit present.      Mental Status: He is alert and oriented to person, place, and time.   Psychiatric:         Mood and Affect: Mood normal.         Behavior: Behavior normal.         Thought Content: Thought content normal.           Alexis Edge MD  Family Medicine Physician   [facility] [place]        Administrative Statements     "

## 2024-06-18 ENCOUNTER — TELEPHONE (OUTPATIENT)
Age: 44
End: 2024-06-18

## 2024-06-18 DIAGNOSIS — E55.9 VITAMIN D DEFICIENCY: Primary | ICD-10-CM

## 2024-06-18 RX ORDER — CHOLECALCIFEROL (VITAMIN D3) 1250 MCG
CAPSULE ORAL
Qty: 24 CAPSULE | Refills: 0 | Status: SHIPPED | OUTPATIENT
Start: 2024-06-18

## 2024-07-05 NOTE — PROGRESS NOTES
Ambulatory Visit  Name: Otto Altman      : 1980      MRN: [id number]  Encounter Provider: LEILANI Arroyo  Encounter Date: 2024   Encounter department: [facility] [place]    Assessment & Plan   1. Type 1 diabetes mellitus with hyperglycemia (HCC)  Assessment & Plan:  A1c has worsened from previous visit.    He does not have blood glucose logs and does not check his fingersticks.  We will order freestyle demond 3 continuous glucose monitor for him.  Unable to change insulin regimen at this time due to lack of information surrounding blood glucose levels.  Continue Lantus 50 units twice daily.  Continue NovoLog with ICR of 1u/5 g and ISF of 1 unit for every 25 mg/dL over 150 mg/dL.    Discussed extensively the benefits of using an insulin pump with type 1 diabetes.  Information for insulin pumps provided.  Patient will reach out in regards to which pump he prefers and hopefully we will order that for him before his next visit.    Honest conversation had regarding potential adverse effects on his noncompliance with diabetes regimen.  Counseled on the long-term effects of hyperglycemia and uncontrolled diabetes including risk for heart attack, stroke, kidney failure, diabetic foot ulcers, limb loss, blindness, and death.     Advised lifestyle modifications including attention to diet including the amount and types of carbohydrates consumed and regular physical activity.     Follow-up appointment in 3 months.  Labs prior to next visit  Lab Results   Component Value Date    HGBA1C 9.1 (A) 2024     Orders:  -     POCT hemoglobin A1c  -     Continuous Glucose Sensor (FreeStyle Demond 3 Sensor) MISC; Use to check your blood sugar continuously and change every 2 weeks  -     Continuous Glucose  (FreeStyle Demond 3 [place]) POLA; Use to monitor blood sugar continuously  -     Ambulatory referral to Diabetic Education  -     Hemoglobin A1C; Future;  "Expected date: 10/08/2024  -     Comprehensive metabolic panel; Future; Expected date: 10/08/2024  2. Vitamin D deficiency  Assessment & Plan:  Vitamin D within lower end of target range.  Continue vitamin D3 supplementation.  3. Mixed hyperlipidemia  Assessment & Plan:  LDL above goal of less than 70 mg/dL.  Increase compliance and continue atorvastatin 40 mg daily.  4. Obesity (BMI 30-39.9)  Assessment & Plan:  Decrease caloric intake, follow a low-fat balanced diet, avoid processed foods and sweetened beverages, and stay well-hydrated.    Currently taking phentermine 15 mg capsules prescribed a PCP.    Increase physical activity as tolerated.  Walk after meals.  Recommend exercising at least 30 minutes 5 days a week.      History of Present Illness     Otto Altman is a 44 y.o. male who presents to the office today for follow-up of Type 1 Diabetes, diagnosed at age 7 years old.  Past medical history is significant for peripheral neuropathy, microalbuminuria, HLD, vitamin D deficiency, obesity. He was last seen in the office 6/1/2023 by Dr. Hanna at which time he was started Wegovy, however he was unable to obtain.    Denies episodes of hypoglycemia.    Current home glucose monitoring includes: No fingersticks performed regularly.  Will perform if feeling \"off\".    Current Medication Regimen:   Lantus 50 units in the morning and 50 units at bedtime  NovoLog      ICR: 1 unit: 5 g      ISF: 1 unit: 25 mg/dL over 150 mg/dL -does not use    Diabetic Eye Exam: Needs to schedule  Follows with Podiatry: Needs to schedule  Has Thyroid Disorder: No  Has Hypertension, managed by PCP, taking: No ACEi/ARB  Has Hyperlipidemia, managed by PCP, taking: Lipitor 40 mg daily, Tolerating well with no myalgias  History of Pancreatitis: No      Review of Systems   Constitutional:  Negative for appetite change, fatigue and unexpected weight change.   HENT:  Negative for congestion, hearing loss and sore throat.    Respiratory:  " Negative for cough.    Cardiovascular:  Positive for leg swelling. Negative for chest pain and palpitations.   Gastrointestinal:  Negative for abdominal pain, constipation, diarrhea, nausea and vomiting.   Endocrine: Negative for polydipsia and polyuria.   Musculoskeletal:  Positive for arthralgias. Negative for myalgias.   Skin:  Negative for rash.   Neurological:  Negative for dizziness and weakness.   Psychiatric/Behavioral:  Negative for sleep disturbance.      Current Outpatient Medications on File Prior to Visit   Medication Sig Dispense Refill    atorvastatin (LIPITOR) 40 mg tablet TAKE 1 TABLET BY MOUTH EVERY DAY 90 tablet 1    B-D ULTRAFINE III SHORT PEN 31G X 8 MM MISC INJECT UNDER THE SKIN 4 (FOUR) TIMES A  each 7    Cholecalciferol (Vitamin D3) 1.25 MG (66796 UT) CAPS 2 times a week for 12 weeks. 24 capsule 0    ciclopirox (PENLAC) 8 % solution APPLY TO THE AFFECTED AREA(S) TOPICALLY ONCE DAILY PREFERABLY AT BEDTIME OR 8 HOURS BEFORE WASHING      insulin lispro (HumaLOG) 100 units/mL injection pen USE 1U/5GM CARB PLUS 1U/25MG/DL ABOVE 120MG/DL MAX 60U/DAY 15 mL 5    Lantus SoloStar 100 units/mL SOPN Inject 50 units under the skin every 12 hours 30 mL 3    Multiple Vitamins-Minerals (MULTIVITAMIN ADULT PO) Take by mouth      Omega-3 Fatty Acids (fish oil) 1,000 mg Take 1,000 mg by mouth daily      OneTouch Verio test strip USE 1 EACH 4 (FOUR) TIMES A  strip 9    phentermine 15 MG capsule Take 1 capsule (15 mg total) by mouth every morning 30 capsule 0    furosemide (LASIX) 20 mg tablet Take 1 tablet (20 mg total) by mouth daily for 5 days (Patient not taking: Reported on 7/8/2024) 5 tablet 0     No current facility-administered medications on file prior to visit.      Social History     Tobacco Use    Smoking status: Never    Smokeless tobacco: Never   Vaping Use    Vaping status: Never Used   Substance and Sexual Activity    Alcohol use: Not Currently     Comment: on occasion    Drug use:  "Never    Sexual activity: Yes     Partners: Female     Objective     /72 (BP Location: Right arm, Patient Position: Sitting, Cuff Size: Standard)   Pulse (!) 113   Ht 5' 11\" (1.803 m)   Wt 126 kg (278 lb 9.6 oz)   SpO2 95%   BMI 38.86 kg/m²     Physical Exam  Vitals reviewed.   Constitutional:       General: He is not in acute distress.     Appearance: He is well-developed.   HENT:      Head: Normocephalic and atraumatic.      Mouth/Throat:      Mouth: Mucous membranes are moist.   Eyes:      Conjunctiva/sclera: Conjunctivae normal.   Cardiovascular:      Rate and Rhythm: Normal rate.   Pulmonary:      Effort: Pulmonary effort is normal. No respiratory distress.   Abdominal:      General: There is no distension.      Palpations: Abdomen is soft.   Musculoskeletal:         General: No swelling.      Cervical back: Normal range of motion.   Skin:     General: Skin is warm and dry.   Neurological:      Mental Status: He is alert and oriented to person, place, and time.   Psychiatric:         Mood and Affect: Mood normal.         Behavior: Behavior normal.         Thought Content: Thought content normal.       Administrative Statements   I have spent a total time of 32 minutes in caring for this patient on the day of the visit/encounter including Diagnostic results, Prognosis, Risks and benefits of tx options, Instructions for management, Patient and family education, Importance of tx compliance, Risk factor reductions, Impressions, Counseling / Coordination of care, Documenting in the medical record, Reviewing / ordering tests, medicine, procedures  , and Obtaining or reviewing history  .    "

## 2024-07-08 ENCOUNTER — OFFICE VISIT (OUTPATIENT)
Age: 44
End: 2024-07-08
Payer: COMMERCIAL

## 2024-07-08 ENCOUNTER — TELEPHONE (OUTPATIENT)
Age: 44
End: 2024-07-08

## 2024-07-08 VITALS
BODY MASS INDEX: 39 KG/M2 | SYSTOLIC BLOOD PRESSURE: 126 MMHG | DIASTOLIC BLOOD PRESSURE: 72 MMHG | HEIGHT: 71 IN | WEIGHT: 278.6 LBS | OXYGEN SATURATION: 95 % | HEART RATE: 113 BPM

## 2024-07-08 DIAGNOSIS — E10.65 TYPE 1 DIABETES MELLITUS WITH HYPERGLYCEMIA (HCC): Primary | ICD-10-CM

## 2024-07-08 DIAGNOSIS — E78.2 MIXED HYPERLIPIDEMIA: ICD-10-CM

## 2024-07-08 DIAGNOSIS — E55.9 VITAMIN D DEFICIENCY: ICD-10-CM

## 2024-07-08 DIAGNOSIS — E66.9 OBESITY (BMI 30-39.9): ICD-10-CM

## 2024-07-08 LAB — SL AMB POCT HEMOGLOBIN AIC: 9.1 (ref ?–6.5)

## 2024-07-08 PROCEDURE — 83036 HEMOGLOBIN GLYCOSYLATED A1C: CPT

## 2024-07-08 PROCEDURE — 99214 OFFICE O/P EST MOD 30 MIN: CPT

## 2024-07-08 RX ORDER — KETOROLAC TROMETHAMINE 30 MG/ML
INJECTION, SOLUTION INTRAMUSCULAR; INTRAVENOUS
Qty: 1 EACH | Refills: 0 | Status: SHIPPED | OUTPATIENT
Start: 2024-07-08

## 2024-07-08 RX ORDER — BLOOD-GLUCOSE SENSOR
EACH MISCELLANEOUS
Qty: 2 EACH | Refills: 11 | Status: SHIPPED | OUTPATIENT
Start: 2024-07-08

## 2024-07-08 NOTE — TELEPHONE ENCOUNTER
PA for   Continuous Glucose  (FreeStyle Demond 3 [place]) POLA     Submitted via    []CMM-KEY    [x][employer]Case ID # [id number]     []Faxed to plan   []Other website    []Phone call Case ID #      Office notes sent, clinical questions answered. Awaiting determination    Turnaround time for your insurance to make a decision on your Prior Authorization can take 7-21 business days.

## 2024-07-08 NOTE — ASSESSMENT & PLAN NOTE
A1c has worsened from previous visit.    He does not have blood glucose logs and does not check his fingersticks.  We will order freestyle milly 3 continuous glucose monitor for him.  Unable to change insulin regimen at this time due to lack of information surrounding blood glucose levels.  Continue Lantus 50 units twice daily.  Continue NovoLog with ICR of 1u/5 g and ISF of 1 unit for every 25 mg/dL over 150 mg/dL.    Discussed extensively the benefits of using an insulin pump with type 1 diabetes.  Information for insulin pumps provided.  Patient will reach out in regards to which pump he prefers and hopefully we will order that for him before his next visit.    Honest conversation had regarding potential adverse effects on his noncompliance with diabetes regimen.  Counseled on the long-term effects of hyperglycemia and uncontrolled diabetes including risk for heart attack, stroke, kidney failure, diabetic foot ulcers, limb loss, blindness, and death.     Advised lifestyle modifications including attention to diet including the amount and types of carbohydrates consumed and regular physical activity.     Follow-up appointment in 3 months.  Labs prior to next visit  Lab Results   Component Value Date    HGBA1C 9.1 (A) 07/08/2024

## 2024-07-08 NOTE — ASSESSMENT & PLAN NOTE
Decrease caloric intake, follow a low-fat balanced diet, avoid processed foods and sweetened beverages, and stay well-hydrated.    Currently taking phentermine 15 mg capsules prescribed a PCP.    Increase physical activity as tolerated.  Walk after meals.  Recommend exercising at least 30 minutes 5 days a week.

## 2024-07-08 NOTE — PATIENT INSTRUCTIONS
Lantus 50 units in the morning and 50 units at bedtime  NovoLog      ICR: 1 unit: 5 g      ISF: 1 unit: 25 mg/dL over 150 mg/dL

## 2024-07-10 NOTE — TELEPHONE ENCOUNTER
PA for Continuous Glucose  (FreeStyle Demond 3 [place]) POLA     Submitted via    []CMM-KEY    [x][employer]-Case ID # [id number]   []Faxed to plan   []Other website    []Phone call Case ID #      Office notes sent, clinical questions answered. Awaiting determination    Turnaround time for your insurance to make a decision on your Prior Authorization can take 7-21 business days.

## 2024-07-10 NOTE — TELEPHONE ENCOUNTER
PA for Continuous Glucose Sensor (FreeStyle Demond 3 Sensor) MISC     Submitted via    []CMM-KEY    [x][employer]-Case ID # [id number]    []Faxed to plan   []Other website    []Phone call Case ID #      Office notes sent, clinical questions answered. Awaiting determination    Turnaround time for your insurance to make a decision on your Prior Authorization can take 7-21 business days.

## 2024-07-11 NOTE — TELEPHONE ENCOUNTER
PA for Continuous Glucose Sensor (FreeStyle Demond 3 Sensor) MISC  Approved     Date(s) approved July 10, 2024 to July 10, 2025     Case #[id number]     Patient advised by          [x] [employer] Message  [] Phone call   []LMOM  []L/M to call office as no active Communication consent on file  []Unable to leave detailed message as VM not approved on Communication consent       Pharmacy advised by    [x]Fax  []Phone call    Approval letter scanned into Media Yes

## 2024-07-11 NOTE — TELEPHONE ENCOUNTER
PA for Continuous Glucose  (FreeStyle Demond 3 [place]) POLA  Approved     Date(s) approved July 10, 2024 to July 10, 2025     Case # [id number]     Patient advised by          [x] [email or web address] Message  [] Phone call   []LMOM  []L/M to call office as no active Communication consent on file  []Unable to leave detailed message as VM not approved on Communication consent       Pharmacy advised by    [x]Fax  []Phone call    Approval letter scanned into Media Yes

## 2024-07-26 DIAGNOSIS — Z76.89 ENCOUNTER FOR WEIGHT MANAGEMENT: ICD-10-CM

## 2024-07-28 RX ORDER — PHENTERMINE HYDROCHLORIDE 15 MG/1
CAPSULE ORAL
Qty: 30 CAPSULE | Refills: 0 | Status: SHIPPED | OUTPATIENT
Start: 2024-07-28

## 2024-08-02 ENCOUNTER — PREP FOR PROCEDURE (OUTPATIENT)
Age: 44
End: 2024-08-02

## 2024-08-02 ENCOUNTER — TELEPHONE (OUTPATIENT)
Age: 44
End: 2024-08-02

## 2024-08-02 DIAGNOSIS — Z12.11 SCREENING FOR COLON CANCER: Primary | ICD-10-CM

## 2024-08-02 NOTE — TELEPHONE ENCOUNTER
08/02/24  Screened by: Nyla Yañez    Referring Provider Carmine    Pre- Screening:     There is no height or weight on file to calculate BMI.  Has patient been referred for a routine screening Colonoscopy? yes  Is the patient between 45-75 years old? yes      Previous Colonoscopy no   If yes:    Date:     Facility:     Reason:       Does the patient want to see a Gastroenterologist prior to their procedure OR are they having any GI symptoms? no    Has the patient been hospitalized or had abdominal surgery in the past 6 months? no    Does the patient use supplemental oxygen? no    Does the patient take Coumadin, Lovenox, Plavix, Elliquis, Xarelto, or other blood thinning medication? no    Has the patient had a stroke, cardiac event, or stent placed in the past year? no      If patient is between 45yrs - 49yrs, please advise patient that we will have to confirm benefits & coverage with their insurance company for a routine screening colonoscopy.

## 2024-08-02 NOTE — TELEPHONE ENCOUNTER
Scheduled date of colonoscopy (as of today):09/03/24  Physician performing colonoscopy: Carmine  Location of colonoscopy: [place]  Bowel prep reviewed with patient: CONNOR/EMILY sent to My Chart  Instructions reviewed with patient by: YESSICA  Clearances: Diabetic    : Carlos (neighbor) [phone]

## 2024-08-02 NOTE — LETTER
Melquiades Menjivar,      Attached are your instructions for your upcoming procedure on 09/03/24Medicine Instructions for Adults with Diabetes who Need a Bowel Prep       Follow these instructions when a BOWEL PREP is required for your procedure or surgery!    NOTE:   GLP-1 Agonists taken weekly: do not take in the 7 days before your procedure   SGLT-2 Inhibitors: do not take in the 4 days before your procedure     On the Day Before Surgery/Procedure  If you are having a procedure (e.g. Colonoscopy) or surgery that requires a bowel prep and you may have at least a clear liquid diet, follow the directions below based on the type of medicine you take for your diabetes.     Type of Medicine You Take Examples What to do   Pre-Mixed Insulin - Intermediate Acting Humalog® 75/25, Humulin® 70/30, Novolog® 70/30, Regular Insulin Take ½ your regular dose the evening before your procedure   Rapid/Fast Acting Insulin Humalog® U200, NovoLog®, Apidra®, Fiasp® Take ½ your regular dose the evening before your procedure.   Long-Acting Insulin Lantus®, Levemir®, Tresiba®, Toujeo®, Basaglar® Take your FULL regular dose the day before procedure   Oral Sulfonylurea Glipizide/Glimepiride/Glucotrol® Take ½ your regular dose the evening before your procedure   Other Oral Diabetes Medicines Metformin®, Glucophage®, Glucophage XR®, Riomet®, Glumetza®), Actose®, Avandia®, Glyset®, Prandin® Take your regular dose with dinner in the evening before your procedure   GLP-1 Agonists AdlyxinÒ, ByettaÒ, BydureonÒ, OzempicÒ, SoliquaÒ, TanzeumÒ, TrulicityÒ, VictozaÒ, Saxenda®, Rybelsus® If taken daily, take as normal    If taken weekly, do not take this medicine for 7 days before your procedure including the day of the procedure (resume taking after the procedure)   SGLT-2 Inhibitors Jardiance®, Invokana®, Farxiga®,   Steglatro®, Brenzavvy®, Qtern®, Segluromet®, Glyxambi®, Synjardy®, Synjardy XR®, Invokamet®, Invokamet XR®, Trijary XR®, Xigduo XR®, Steglujan®  Do not take for 4 days before your procedure including the day of the procedure (resume taking after the procedure)                More information continued on back                    Medicine Instructions for Adults with Diabetes who Need a Bowel Prep  Page 2      On the Day of Surgery/Procedure  Follow the directions below based on the type of medicine you take for your diabetes.     Type of Medicine You Take Examples What to do   Long-Acting Insulin Lantus®, Levemir®, Tresiba®, Toujeo®, Basaglar®, Semglee®   If you usually take your Long-Acting Insulin in the morning, take the full dose as scheduled.   GLP-1 Agonists AdlyxinÒ, ByettaÒ, BydureonÒ, OzempicÒ, SoliquaÒ, TanzeumÒ, TrulicityÒ, VictozaÒ, Saxenda®, Rybelsus® Do NOT take this medicine on the day of your procedure (resume taking after the procedure)       On the Day of Surgery/Procedure (continued)  Except for the morning Long-Acting Insulin, DO NOT take ANY diabetic medicine on the day of your procedure unless you were instructed by the doctor who manages your diabetes medicines.    Continue to check your blood sugars.  If you have an insulin pump, ask your endocrinologist for instructions at least 3 days before your procedure. NOTE: If you are not able to ask your endocrinologist in advance, on the day of the procedure set your insulin pump to your basal rate only. Bring your insulin pump supplies to the hospital.     If you have any questions about taking your diabetes medicines prior to your procedure, please contact the doctor who manages your diabetes medicines.    COLONOSCOPY  MIRALAX/Dulcolax Bowel Preparation Instructions    The OR/GI Lab will contact you the evening prior to your procedure with your exact arrival time.    Our practice requires a 1 week notice for any cancellations or rescheduling. We kindly ask that you immediately notify us of any changes including any new medications that are prescribed. Thank you for your cooperation.      WEEK BEFORE YOUR PROCEDURE:  Stop taking Iron tablets.  5 days prior, AVOID vegetables and fruits with skins or seeds, nuts, corn, popcorn and whole grain breads.   Purchase: One (1) 238-gram container of Miralax (polyethylene glycol 3350), four (4) 5 mg Dulcolax (bisacodyl) tablets, and one (1) 64-ounce bottle of Gatorade (sports drink) - no red, orange, or purple. These may be purchased at any pharmacy without a prescription. Generic products are permissible.   Arrange responsible transportation for day of the procedure.     DAY BEFORE THE PROCEDURE:   CLEAR liquids only for entire day prior. Nothing red, orange or purple.    You MAY have:                                                               Soda  Water  Broth Gatorade  Jello  Popsicles Coffee/tea without milk/creamer     YOU MAY NOT HAVE:  Solid foods   Milk and milk products    Juice with pulp    BOWEL PREPARATION:  Includes: One (1) 238-gram container of Miralax (polyethylene glycol 3350), four (4) 5 mg Dulcolax (bisacodyl) tablets, and one (1) 64-ounce bottle of Gatorade (sports drink).  Preparation may be refrigerated.  Entire bowel prep should be completed.     Afternoon before the procedure (2:00 pm - 5:00 pm):    Take two (2) 5 mg Dulcolax laxative tablets.     Evening before the procedure (6:00 pm):  Mix entire container of Miralax with one (1) 64-ounce bottle of Gatorade and shake until all medication is dissolved.   Begin drinking solution. Drink an eight (8) ounce cup every 10-15 minutes until you have consumed half (32 ounces) of the solution.  Refrigerate remaining solution.    Night before the procedure (8:00 pm):  Take two (2) 5 mg Dulcolax laxative tablets.     Beginning 5 hours before your procedure:  Drink the remaining amount of prepared solution (32 ounces).  Drink an eight (8) ounce cup every 10-15 minutes until you have consumed the remaining solution.     Bowel prep should be completed 4 hours prior to procedure time.    NOTHING TO  EAT OR DRINK AFTER MIDNIGHT- EXCEPT FOR YOUR PREP    DAY OF THE PROCEDURE:  You may brush your teeth.  Leave all jewelry at home.  Please arrive for your procedure as indicated by the OR / GI Lab / Endoscopy Unit. The hospital will contact you the day before with your exact arrival time.   Make sure you have arranged ahead of time for a responsible adult (18 or older) to accompany and drive you home after the procedure.  Please discuss any transportation concerns with our staff prior to your procedure.    The effects of the anesthesia can persist for 24 hours.  After receiving the sedation, you must exercise caution before engaging in any activity that could harm yourself and others (such as driving a car).  Do not make any important decisions or do not drink any alcoholic beverages during this time period.  After your procedure, you may have anything you'd like to eat or drink.  You will probably want to start with something light.  Please include plenty of fluids.  Avoid items that cause gas such as sodas and salads.    SPECIAL INSTRUCTIONS:    For patients currently taking blood thinners and/or antiplatelet therapy our office will contact the prescribing provider.  Our office will contact you with any required changes to your medication regimen.     Blood thinner (i.e. - Coumadin, Pradaxa, Lovenox, Xarelto, Eliquis)  ?  Continue (Do Not Stop)  ? Stop______________for_____________days prior to the procedure.    Antiplatelet (i.e. - Plavix, Aggrenox, Effient, Brilinta)  ?  Continue (Do Not Stop)  ? Stop______________for_____________days prior to the procedure.       Diabetes:   If you are Diabetic, please see separate Diabetic Instruction Sheet.          Prescribed medications:  Do not stop your aspirin, or any of your other medications (unless instructed otherwise).    Take the rest of your prescribed medications with small sips of water at least 2 hours prior to your procedure.      For any questions or concerns  related to your bowel preparation or pre-procedure instructions, please contact our office at [phone].  Thank you for choosing [facility]  If you have any questions or concerns, please call us at [phone].      Thank you,     [facility]astroenter[facility], Colon & Rectal Surgery

## 2024-08-14 ENCOUNTER — HOSPITAL ENCOUNTER (OUTPATIENT)
Dept: NON INVASIVE DIAGNOSTICS | Facility: CLINIC | Age: 44
Discharge: HOME | End: 2024-08-14
Payer: COMMERCIAL

## 2024-08-14 VITALS
WEIGHT: 278 LBS | BODY MASS INDEX: 38.92 KG/M2 | SYSTOLIC BLOOD PRESSURE: 126 MMHG | DIASTOLIC BLOOD PRESSURE: 72 MMHG | HEIGHT: 71 IN | HEART RATE: 115 BPM

## 2024-08-14 DIAGNOSIS — R06.02 SOB (SHORTNESS OF BREATH) ON EXERTION: ICD-10-CM

## 2024-08-14 LAB
AORTIC ROOT: 3.6 CM
APICAL FOUR CHAMBER EJECTION FRACTION: 45 %
ASCENDING AORTA: 3.7 CM
BSA FOR ECHO PROCEDURE: 2.43 M2
E WAVE DECELERATION TIME: 180 MS
E/A RATIO: 0.82
FRACTIONAL SHORTENING: 26 (ref 28–44)
INTERVENTRICULAR SEPTUM IN DIASTOLE (PARASTERNAL SHORT AXIS VIEW): 1.3 CM
INTERVENTRICULAR SEPTUM: 1.3 CM (ref 0.6–1.1)
LAAS-AP2: 16.5 CM2
LAAS-AP4: 16.6 CM2
LEFT ATRIUM SIZE: 4 CM
LEFT ATRIUM VOLUME (MOD BIPLANE): 45 ML
LEFT ATRIUM VOLUME INDEX (MOD BIPLANE): 18.5 ML/M2
LEFT INTERNAL DIMENSION IN SYSTOLE: 3.2 CM (ref 2.1–4)
LEFT VENTRICULAR INTERNAL DIMENSION IN DIASTOLE: 4.3 CM (ref 3.5–6)
LEFT VENTRICULAR POSTERIOR WALL IN END DIASTOLE: 1.2 CM
LEFT VENTRICULAR STROKE VOLUME: 42 ML
LVSV (TEICH): 42 ML
MV E'TISSUE VEL-SEP: 6 CM/S
MV PEAK A VEL: 0.6 M/S
MV PEAK E VEL: 49 CM/S
MV STENOSIS PRESSURE HALF TIME: 52 MS
MV VALVE AREA P 1/2 METHOD: 4.23
RIGHT ATRIUM AREA SYSTOLE A4C: 18.6 CM2
RIGHT VENTRICLE ID DIMENSION: 4.1 CM
SL CV LEFT ATRIUM LENGTH A2C: 4.5 CM
SL CV LV EF: 54
SL CV PED ECHO LEFT VENTRICLE DIASTOLIC VOLUME (MOD BIPLANE) 2D: 84 ML
SL CV PED ECHO LEFT VENTRICLE SYSTOLIC VOLUME (MOD BIPLANE) 2D: 42 ML
TRICUSPID ANNULAR PLANE SYSTOLIC EXCURSION: 1.8 CM

## 2024-08-14 PROCEDURE — 93306 TTE W/DOPPLER COMPLETE: CPT | Performed by: STUDENT IN AN ORGANIZED HEALTH CARE EDUCATION/TRAINING PROGRAM

## 2024-08-14 PROCEDURE — 93306 TTE W/DOPPLER COMPLETE: CPT

## 2024-09-03 ENCOUNTER — ANESTHESIA EVENT (OUTPATIENT)
Dept: GASTROENTEROLOGY | Facility: HOSPITAL | Age: 44
End: 2024-09-03

## 2024-09-03 ENCOUNTER — HOSPITAL ENCOUNTER (OUTPATIENT)
Dept: GASTROENTEROLOGY | Facility: HOSPITAL | Age: 44
Setting detail: OUTPATIENT SURGERY
Discharge: HOME | End: 2024-09-03
Attending: INTERNAL MEDICINE
Payer: COMMERCIAL

## 2024-09-03 ENCOUNTER — ANESTHESIA (OUTPATIENT)
Dept: GASTROENTEROLOGY | Facility: HOSPITAL | Age: 44
End: 2024-09-03

## 2024-09-03 VITALS
TEMPERATURE: 97.3 F | DIASTOLIC BLOOD PRESSURE: 88 MMHG | BODY MASS INDEX: 37.62 KG/M2 | OXYGEN SATURATION: 98 % | RESPIRATION RATE: 20 BRPM | WEIGHT: 277.78 LBS | SYSTOLIC BLOOD PRESSURE: 157 MMHG | HEIGHT: 72 IN | HEART RATE: 101 BPM

## 2024-09-03 DIAGNOSIS — Z12.11 SCREENING FOR COLON CANCER: ICD-10-CM

## 2024-09-03 LAB — GLUCOSE SERPL-MCNC: 101 MG/DL (ref 65–140)

## 2024-09-03 PROCEDURE — 45380 COLONOSCOPY AND BIOPSY: CPT | Performed by: INTERNAL MEDICINE

## 2024-09-03 PROCEDURE — 82948 REAGENT STRIP/BLOOD GLUCOSE: CPT

## 2024-09-03 PROCEDURE — 88305 TISSUE EXAM BY PATHOLOGIST: CPT | Performed by: PATHOLOGY

## 2024-09-03 RX ORDER — LIDOCAINE HYDROCHLORIDE 10 MG/ML
INJECTION, SOLUTION EPIDURAL; INFILTRATION; INTRACAUDAL; PERINEURAL AS NEEDED
Status: DISCONTINUED | OUTPATIENT
Start: 2024-09-03 | End: 2024-09-03

## 2024-09-03 RX ORDER — PROPOFOL 10 MG/ML
INJECTION, EMULSION INTRAVENOUS AS NEEDED
Status: DISCONTINUED | OUTPATIENT
Start: 2024-09-03 | End: 2024-09-03

## 2024-09-03 RX ORDER — SODIUM CHLORIDE, SODIUM LACTATE, POTASSIUM CHLORIDE, CALCIUM CHLORIDE 600; 310; 30; 20 MG/100ML; MG/100ML; MG/100ML; MG/100ML
75 INJECTION, SOLUTION INTRAVENOUS CONTINUOUS
Status: DISCONTINUED | OUTPATIENT
Start: 2024-09-03 | End: 2024-09-07 | Stop reason: HOSPADM

## 2024-09-03 RX ORDER — SODIUM CHLORIDE, SODIUM LACTATE, POTASSIUM CHLORIDE, CALCIUM CHLORIDE 600; 310; 30; 20 MG/100ML; MG/100ML; MG/100ML; MG/100ML
INJECTION, SOLUTION INTRAVENOUS CONTINUOUS PRN
Status: DISCONTINUED | OUTPATIENT
Start: 2024-09-03 | End: 2024-09-03

## 2024-09-03 RX ADMIN — LIDOCAINE HYDROCHLORIDE 50 MG: 10 INJECTION, SOLUTION EPIDURAL; INFILTRATION; INTRACAUDAL; PERINEURAL at 07:28

## 2024-09-03 RX ADMIN — PROPOFOL 50 MG: 10 INJECTION, EMULSION INTRAVENOUS at 07:30

## 2024-09-03 RX ADMIN — SODIUM CHLORIDE, SODIUM LACTATE, POTASSIUM CHLORIDE, AND CALCIUM CHLORIDE: .6; .31; .03; .02 INJECTION, SOLUTION INTRAVENOUS at 07:24

## 2024-09-03 RX ADMIN — PROPOFOL 50 MG: 10 INJECTION, EMULSION INTRAVENOUS at 07:39

## 2024-09-03 RX ADMIN — PROPOFOL 50 MG: 10 INJECTION, EMULSION INTRAVENOUS at 07:34

## 2024-09-03 RX ADMIN — PROPOFOL 100 MG: 10 INJECTION, EMULSION INTRAVENOUS at 07:28

## 2024-09-03 NOTE — ANESTHESIA PREPROCEDURE EVALUATION
Procedure:  COLONOSCOPY    Relevant Problems   CARDIO   (+) Mixed hyperlipidemia      ENDO   (+) Diabetes mellitus type 1 (HCC)        Physical Exam    Airway    Mallampati score: I  TM Distance: >3 FB  Neck ROM: full     Dental       Cardiovascular  Cardiovascular exam normal    Pulmonary  Pulmonary exam normal     Other Findings        Anesthesia Plan  ASA Score- 3     Anesthesia Type- IV sedation with anesthesia with ASA Monitors.         Additional Monitors:     Airway Plan:            Plan Factors-Exercise tolerance (METS): >4 METS.    Chart reviewed. EKG reviewed. Imaging results reviewed. Existing labs reviewed. Patient summary reviewed.                  Induction- intravenous.    Postoperative Plan- Plan for postoperative opioid use. Planned trial extubation        Informed Consent- Anesthetic plan and risks discussed with patient.  I personally reviewed this patient with the CRNA. Discussed and agreed on the Anesthesia Plan with the CRNA..

## 2024-09-03 NOTE — H&P
History and Physical -  [facility]  Otto Altman 44 y.o. male MRN: [id number]      HPI: Otto Altman is a 44 y.o. year old male who presents for screening colonoscopy      REVIEW OF SYSTEMS: Per the HPI, and otherwise unremarkable.    Historical Information   Past Medical History:   Diagnosis Date    Diabetes mellitus type 1 (HCC)      Past Surgical History:   Procedure Laterality Date    NO PAST SURGERIES       Social History   Social History     Substance and Sexual Activity   Alcohol Use Yes    Comment: on occasion     Social History     Substance and Sexual Activity   Drug Use Never     Social History     Tobacco Use   Smoking Status Never   Smokeless Tobacco Never     Family History   Problem Relation Age of Onset    Diabetes Mother     Diabetes Father        Meds/Allergies     Not in a hospital admission.    No Known Allergies    Objective     Blood pressure 154/81, pulse (!) 110, temperature 98.1 °F (36.7 °C), temperature source Temporal, resp. rate 18, height 6' (1.829 m), weight 126 kg (277 lb 12.5 oz), SpO2 98%.      PHYSICAL EXAM    Gen: NAD  CV: RRR  CHEST: Clear  ABD: soft, NT/ND  EXT: no edema      ASSESSMENT/PLAN:  This is a 44 y.o. year old male here for colonoscopy, and he is stable and optimized for his procedure.

## 2024-09-03 NOTE — ANESTHESIA POSTPROCEDURE EVALUATION
Post-Op Assessment Note    CV Status:  Stable  Pain Score: 0    Pain management: adequate       Mental Status:  Alert   Hydration Status:  Euvolemic   PONV Controlled:  None   Airway Patency:  Patent  Two or more mitigation strategies used for obstructive sleep apnea   Post Op Vitals Reviewed: Yes    No anethesia notable event occurred.    Staff: CLEMENTE               /64 (09/03/24 0745)    Temp (!) 97.3 °F (36.3 °C) (09/03/24 0745)    Pulse 105 (09/03/24 0745)   Resp 15 (09/03/24 0745)    SpO2 98 % (09/03/24 0745)

## 2024-09-08 DIAGNOSIS — E55.9 VITAMIN D DEFICIENCY: ICD-10-CM

## 2024-09-09 RX ORDER — CHOLECALCIFEROL (VITAMIN D3) 1250 MCG
CAPSULE ORAL
Qty: 24 CAPSULE | Refills: 0 | OUTPATIENT
Start: 2024-09-09

## 2024-09-10 PROCEDURE — 88305 TISSUE EXAM BY PATHOLOGIST: CPT | Performed by: PATHOLOGY

## 2024-09-11 ENCOUNTER — EVALUATION (OUTPATIENT)
Dept: PHYSICAL THERAPY | Facility: CLINIC | Age: 44
End: 2024-09-11
Payer: COMMERCIAL

## 2024-09-11 DIAGNOSIS — R60.1 GENERALIZED EDEMA: ICD-10-CM

## 2024-09-11 DIAGNOSIS — I89.0 LYMPHEDEMA: Primary | ICD-10-CM

## 2024-09-11 PROCEDURE — 97161 PT EVAL LOW COMPLEX 20 MIN: CPT | Performed by: PHYSICAL THERAPIST

## 2024-09-11 PROCEDURE — 97110 THERAPEUTIC EXERCISES: CPT | Performed by: PHYSICAL THERAPIST

## 2024-09-11 NOTE — PROGRESS NOTES
PT Evaluation     Today's date: 2024  Patient name: Otto Altman  : 1980  MRN: [id number]  Referring provider: Alexis Edge MD  Dx:   Encounter Diagnosis     ICD-10-CM    1. Lymphedema  I89.0       2. Generalized edema  R60.1 Ambulatory Referral to PT/OT Lymphedema Therapy                     Assessment  Impairments: activity intolerance, impaired physical strength, lacks appropriate home exercise program and pain with function  Other impairment: LE lymphedema    Assessment details: Otto Altman is a 44 y.o. male presenting as an outpatient to [facility] PT w/ b/l LE lymphedema limiting function. Pt will benefit from skilled PT services to address the above deficits in order to max function to allow pt to achieve goals in PT.  Thank you for the referral of this pt.     Understanding of Dx/Px/POC: good     Prognosis: good    Goals  ST. Edema decreased by 2-3 cm in edematous areas in 4 weeks.   2. Strength increased by 1/2 to 1 muscle grade in all deficient muscle groups in 4 weeks.    LT. Decrease pain to 1-2/10 at worst by d/c.  2. Strength increased to 5 for all deficient muscle groups by d/c.  3. IADL performance increased to max function by d/c.  4. Recreational performance increased to max function by d/c.      Plan  Other planned modality interventions: other modalities PRN    Planned therapy interventions: ADL retraining, home exercise program, manual therapy, neuromuscular re-education, patient education, strengthening, therapeutic exercise, therapeutic activities, transfer training, massage and IADL retraining  Other planned therapy interventions: other interventions PRN    Frequency: 1x/week.  Duration in weeks: 4  Plan of Care beginning date: 2024  Plan of Care expiration date: 10/9/2024  Treatment plan discussed with: patient      Subjective Evaluation    History of Present Illness  Mechanism of injury: Pt presents to  b/l LE lymphedema which began several months  of insidious onset. Pt reports a hx of similar issues previously, but exercise seemed to help.     He reports that he is less active than he should be at this time which he feels may contribute. 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     Pt reports that he had echocardiogram done 8/14- results appear negative for any acute pathology.    Pt reports some heaviness/weakness in b/l LE w/ stair climbing and chair transfers.   Patient Goals  Patient goals for therapy: decreased edema    Pain  No pain reported  Location: b/l LE    Social Support    Employment status: working (Pt works full time as a  for computers/servers, etc-spends a long time driving)      Objective     General Comments:      Ankle/Foot Comments   Negative Xavier's Sign    LE circumferential girth measurements as below (L/R):   Midpatellar:  45.5 cm/45.5 cm  Inferior to patella: 42 cm/42 cm  30 cm proximal to ankle: 45.5 cm/46 cm  20 cm proximal to ankle: 37.5 cm/36.5 cm  10 cm proximal to ankle:  29 cm/28 cm  Ankle Joint: 29.5 cm/28 cm  Midfoot:  25 cm/25.5 cm  MTP: 23 cm/24 cm    LE Strength (L/R):   Hip Flexion: 4/4-  Knee Flexion; 5/5  Knee Extension: 5/5  Ankle DF: 5/5  Ankle PF: 5/5      Daily Treatment Diary    Precautions: DMI; BP slightly high but no dx (negative echocardiogram)  Co- Morbidities:   Patient Active Problem List   Diagnosis   • Diabetes mellitus type 1 (HCC)   • Mixed hyperlipidemia   • Obesity (BMI 30-39.9)   • Vitamin D deficiency   • Generalized edema   • Snoring   • Encounter for weight management         EPOC: 10/9/24             RA DATE: 10/9/24             Manual                       MLD                                                                                                                        Exercise Diary 9/11                   THEREX            Pt Ed RB- HEP instruct and education on elevation, compression brief           Recumbent Bike                     Gastrocs Stretch                      HR/TR                     HS curls            Marches            Active HS stretch w/ ankle pumps            Ankle Pumps                                    NEURO RE-ED            STS                                                                                                  Gait Training                                                                 Modalities

## 2024-09-20 ENCOUNTER — TELEPHONE (OUTPATIENT)
Dept: GASTROENTEROLOGY | Facility: CLINIC | Age: 44
End: 2024-09-20

## 2024-09-20 NOTE — TELEPHONE ENCOUNTER
----- Message from Michael Lou DO sent at 9/19/2024  1:14 PM EDT -----  Patient has not read [employer] message. Please call and share results.

## 2024-09-20 NOTE — TELEPHONE ENCOUNTER
No answer. Left detailed message, results and recommendations      The polyp that was removed from the colon was a benign hyperplastic polyp.  Your next colonoscopy will be due in 10 years.   Written by Michael Lou DO on 9/13/2024  6:56 AM EDT

## 2024-09-25 ENCOUNTER — OFFICE VISIT (OUTPATIENT)
Dept: PHYSICAL THERAPY | Facility: CLINIC | Age: 44
End: 2024-09-25
Payer: COMMERCIAL

## 2024-09-25 DIAGNOSIS — R60.1 GENERALIZED EDEMA: Primary | ICD-10-CM

## 2024-09-25 DIAGNOSIS — I89.0 LYMPHEDEMA: ICD-10-CM

## 2024-09-25 PROCEDURE — 97140 MANUAL THERAPY 1/> REGIONS: CPT

## 2024-09-25 PROCEDURE — 97110 THERAPEUTIC EXERCISES: CPT

## 2024-09-25 NOTE — PROGRESS NOTES
Daily Note     Today's date: 2024  Patient name: Otto Altman  : 1980  MRN: [id number]  Referring provider: Alexis Edge MD  Dx:   Encounter Diagnosis     ICD-10-CM    1. Generalized edema  R60.1       2. Lymphedema  I89.0                      Subjective: pt reports swelling in BL LE come and goes. Stated he sits all day for work and finds it can get worse with that.       Objective: See treatment diary below      Assessment: Tolerated treatment well. Patient would benefit from continued PT. Discussed etiology, prognosis, treatment options of lymphedema. Discussed compression garment options. Discussed the importance of movement and exercise to help w/ the management of lymphedema. Performed MLD to BLE to improve lymphatic flow and decrease swelling.       Plan: Continue per plan of care.      Daily Treatment Diary    Precautions: DMI; BP slightly high but no dx (negative echocardiogram)  Co- Morbidities:   Patient Active Problem List   Diagnosis    Diabetes mellitus type 1 (HCC)    Mixed hyperlipidemia    Obesity (BMI 30-39.9)    Vitamin D deficiency    Generalized edema    Snoring    Encounter for weight management         EPOC: 10/9/24 9/25            RA DATE: 10/9/24             Manual                       MLD  JH                                                                                                                      Exercise Diary                  THEREX            Pt Ed RB- HEP instruct and education on elevation, compression brief JH          Recumbent Bike                     Gastrocs Stretch                     HR/TR                     HS curls            Marches            Active HS stretch w/ ankle pumps            Ankle Pumps                                    NEURO RE-ED            STS                                                                                                  Gait Training                                                                  Modalities

## 2024-09-27 ENCOUNTER — APPOINTMENT (OUTPATIENT)
Age: 44
End: 2024-09-27
Payer: COMMERCIAL

## 2024-09-27 DIAGNOSIS — E55.9 VITAMIN D DEFICIENCY: ICD-10-CM

## 2024-09-27 DIAGNOSIS — B35.1 DERMATOPHYTOSIS OF NAIL: ICD-10-CM

## 2024-09-27 LAB
25(OH)D3 SERPL-MCNC: 59.4 NG/ML (ref 30–100)
ALBUMIN SERPL BCG-MCNC: 4.3 G/DL (ref 3.5–5)
ALP SERPL-CCNC: 79 U/L (ref 34–104)
ALT SERPL W P-5'-P-CCNC: 32 U/L (ref 7–52)
AST SERPL W P-5'-P-CCNC: 20 U/L (ref 13–39)
BILIRUB DIRECT SERPL-MCNC: 0.16 MG/DL (ref 0–0.2)
BILIRUB SERPL-MCNC: 0.62 MG/DL (ref 0.2–1)
PROT SERPL-MCNC: 7.5 G/DL (ref 6.4–8.4)

## 2024-09-27 PROCEDURE — 36415 COLL VENOUS BLD VENIPUNCTURE: CPT

## 2024-09-27 PROCEDURE — 82306 VITAMIN D 25 HYDROXY: CPT

## 2024-09-27 PROCEDURE — 80076 HEPATIC FUNCTION PANEL: CPT

## 2024-09-30 ENCOUNTER — OFFICE VISIT (OUTPATIENT)
Age: 44
End: 2024-09-30
Payer: COMMERCIAL

## 2024-09-30 VITALS
WEIGHT: 279.6 LBS | BODY MASS INDEX: 37.87 KG/M2 | HEIGHT: 72 IN | SYSTOLIC BLOOD PRESSURE: 136 MMHG | TEMPERATURE: 98.9 F | OXYGEN SATURATION: 96 % | DIASTOLIC BLOOD PRESSURE: 84 MMHG | HEART RATE: 111 BPM | RESPIRATION RATE: 16 BRPM

## 2024-09-30 DIAGNOSIS — I10 ESSENTIAL HYPERTENSION: ICD-10-CM

## 2024-09-30 DIAGNOSIS — R00.0 TACHYCARDIA: ICD-10-CM

## 2024-09-30 DIAGNOSIS — R06.02 SOB (SHORTNESS OF BREATH) ON EXERTION: ICD-10-CM

## 2024-09-30 DIAGNOSIS — Z76.89 ENCOUNTER FOR WEIGHT MANAGEMENT: ICD-10-CM

## 2024-09-30 DIAGNOSIS — H61.21 EXCESSIVE CERUMEN IN EAR CANAL, RIGHT: ICD-10-CM

## 2024-09-30 DIAGNOSIS — E78.2 MIXED HYPERLIPIDEMIA: ICD-10-CM

## 2024-09-30 DIAGNOSIS — E10.65 TYPE 1 DIABETES MELLITUS WITH HYPERGLYCEMIA (HCC): Primary | ICD-10-CM

## 2024-09-30 DIAGNOSIS — E66.9 OBESITY (BMI 30-39.9): ICD-10-CM

## 2024-09-30 DIAGNOSIS — R60.1 GENERALIZED EDEMA: ICD-10-CM

## 2024-09-30 PROCEDURE — 99214 OFFICE O/P EST MOD 30 MIN: CPT | Performed by: FAMILY MEDICINE

## 2024-09-30 RX ORDER — TOPIRAMATE 25 MG/1
25 TABLET, FILM COATED ORAL 2 TIMES DAILY
Qty: 200 TABLET | Refills: 3 | Status: SHIPPED | OUTPATIENT
Start: 2024-09-30

## 2024-09-30 RX ORDER — METOPROLOL SUCCINATE 25 MG/1
25 TABLET, EXTENDED RELEASE ORAL DAILY
Qty: 90 TABLET | Refills: 1 | Status: SHIPPED | OUTPATIENT
Start: 2024-09-30

## 2024-09-30 NOTE — ASSESSMENT & PLAN NOTE
Wt Readings from Last 3 Encounters:   09/30/24 127 kg (279 lb 9.6 oz)   09/03/24 126 kg (277 lb 12.5 oz)   08/29/24 126 kg (278 lb)   Initial weight (05/07/24): 282 lbs, Body mass index is 39.44 kg/m².  05/07/24 Body mass index is 39.44 kg/m².  TWL: -12lbs  Discontinued phentermine altogether due to palpitations.  Recommended starting Topamax 25 mg with dinner then increase to twice daily versus 50 mg with dinner.  Continue working on nutritional improvement as previously encouraged and exercising regularly.  Really focusing on building good habits  Return in 3 months  Orders:    topiramate (Topamax) 25 mg tablet; Take 1 tablet (25 mg total) by mouth 2 (two) times a day

## 2024-09-30 NOTE — PROGRESS NOTES
[facility]  Ambulatory Office Visit     PATIENT INFORMATION   Name: Otto Altman   YOB: 1980   MRN: [id number]  Encounter Provider: Alexis Edge MD    Encounter Date: 9/30/2024    ASSESSMENT & PLAN     Assessment & Plan  Type 1 diabetes mellitus with hyperglycemia (HCC)    Lab Results   Component Value Date    HGBA1C 9.1 (A) 07/08/2024   Uncontrolled type 1 diabetes, previously followed in endocrinology, change of insurance has not seen the endocrinologist recently.    Recommend follow-up with endocrinology, has scheduled appointment with endocrinology 10/24/2024.  Currently on Lantus 50 units twice daily and Humalog with meals for carb correction. Uses CGM   Discussed extensively importance of nutritional improvement and weight loss, patient is very discouraged with weight.  Previously referred to ophthalmology and podiatry, has scheduled appointment for podiatry this month.  Return in 3 months.       Encounter for weight management  Wt Readings from Last 3 Encounters:   09/30/24 127 kg (279 lb 9.6 oz)   09/03/24 126 kg (277 lb 12.5 oz)   08/29/24 126 kg (278 lb)   Initial weight (05/07/24): 282 lbs, Body mass index is 39.44 kg/m².  05/07/24 Body mass index is 39.44 kg/m².  TWL: -12lbs  Discontinued phentermine altogether due to palpitations.  Recommended starting Topamax 25 mg with dinner then increase to twice daily versus 50 mg with dinner.  Continue working on nutritional improvement as previously encouraged and exercising regularly.  Really focusing on building good habits  Return in 3 months  Orders:    topiramate (Topamax) 25 mg tablet; Take 1 tablet (25 mg total) by mouth 2 (two) times a day    Obesity (BMI 30-39.9)  Refer to weight management problem list.       Mixed hyperlipidemia  Currently not on statin.   HDL low 42, triglyceride high 206, LDL high 160  Reviewed & discussed labs including lipid panel   Triglycerides/HDL ratio: 4.90. Goal <2, 06/17/2024   Discussed  importance of nutritional intake, exercising regularly.  Advised improving nutritional intake and building good habits as discussed  Goal of <70 LDL, <100 triglycerides, and >60 HDL  Monitor lipid panel, complete blood work prior to next appointment 3 months.  Orders:    Lipid panel; Future    Apolipoprotein B; Future    Generalized edema  Currently following physical therapy, continue care with specialist for lymphedema       SOB (shortness of breath) on exertion  Previously referred to cardiology and pulmonology, patient has not made an appointment with them yet but reports having their phone number and will call.  8/14/2024 echocardiogram completed.  Ejection fraction 54% normal left ventricle cavity, systolic function, wall motion.  Unable to assess diastolic function due to tachycardia.  Has scheduled cardiology new patient appointment on 11/12/2024.  Refer to tachycardia       Tachycardia  Persistent tachycardia started patient on metoprolol succinate 25 mg daily to help with tachycardia and elevated blood pressures.       Essential hypertension  Blood Pressure: 136/84    Currently on: Not on any antihypertensives  Today reports: Persistent tachycardia for a long time now  Med Adjusted: Started metoprolol succinate 25 mg daily  Lifestyle modifications recommended, including reduced sodium intake, regular exercise, maintaining a healthy weight, limiting alcohol consumption, and/or avoiding cig smoking.   Return in 3 months, follow-up with cardiology 11/12/2024, new patient visit.  Orders:    metoprolol succinate (TOPROL-XL) 25 mg 24 hr tablet; Take 1 tablet (25 mg total) by mouth daily    Excessive cerumen in ear canal, right    Orders:    carbamide peroxide (DEBROX) 6.5 % otic solution; Administer 5 drops to the right ear 2 (two) times a day         HEALTH MAINTENANCE     Immunization History   Administered Date(s) Administered    COVID-19 PFIZER VACCINE 0.3 ML IM 12/30/2021    COVID-19 Pfizer vac  (Todd-sucrose, gray cap) 12 yr+ IM 01/20/2022    Pneumococcal Polysaccharide PPV23 03/06/2015     Colonoscopy: 09/03/2024 09/03/2024  Cologuard: Not on file   CT lung:    Prostate:   Lab Results   Component Value Date    PSA 0.701 06/17/2024           FOLLOW UP   Return in about 3 months (around 12/30/2024) for chronic disease management.    CURRENT MEDICATIONS     Current Outpatient Medications:     atorvastatin (LIPITOR) 40 mg tablet, TAKE 1 TABLET BY MOUTH EVERY DAY, Disp: 90 tablet, Rfl: 1    B-D ULTRAFINE III SHORT PEN 31G X 8 MM MISC, INJECT UNDER THE SKIN 4 (FOUR) TIMES A DAY, Disp: 100 each, Rfl: 7    carbamide peroxide (DEBROX) 6.5 % otic solution, Administer 5 drops to the right ear 2 (two) times a day, Disp: 15 mL, Rfl: 0    Continuous Glucose  (FreeStyle Demond 3 Taft) POLA, Use to monitor blood sugar continuously, Disp: 1 each, Rfl: 0    Continuous Glucose Sensor (FreeStyle Demond 3 Sensor) MISC, Use to check your blood sugar continuously and change every 2 weeks, Disp: 2 each, Rfl: 11    insulin lispro (HumaLOG) 100 units/mL injection pen, USE 1U/5GM CARB PLUS 1U/25MG/DL ABOVE 120MG/DL MAX 60U/DAY, Disp: 15 mL, Rfl: 5    Lantus SoloStar 100 units/mL SOPN, Inject 50 units under the skin every 12 hours, Disp: 30 mL, Rfl: 3    metoprolol succinate (TOPROL-XL) 25 mg 24 hr tablet, Take 1 tablet (25 mg total) by mouth daily, Disp: 90 tablet, Rfl: 1    Multiple Vitamins-Minerals (MULTIVITAMIN ADULT PO), Take by mouth, Disp: , Rfl:     Omega-3 Fatty Acids (fish oil) 1,000 mg, Take 1,000 mg by mouth daily, Disp: , Rfl:     OneTouch Verio test strip, USE 1 EACH 4 (FOUR) TIMES A DAY, Disp: 100 strip, Rfl: 9    topiramate (Topamax) 25 mg tablet, Take 1 tablet (25 mg total) by mouth 2 (two) times a day, Disp: 200 tablet, Rfl: 3      CHIEF COMPLIANT     Chief Complaint   Patient presents with    Follow-up        HISTORY OF PRESENT ILLNESS    History of Present Illness     History obtained from :  patient  HPI  Review of Systems    PAST MEDICAL & SURGICAL HISTORY     Past Medical History:   Diagnosis Date    Diabetes mellitus type 1 (HCC)      Past Surgical History:   Procedure Laterality Date    COLONOSCOPY      NO PAST SURGERIES         OBJECTIVES      /84 (BP Location: Right arm, Patient Position: Sitting, Cuff Size: Large)   Pulse (!) 111   Temp 98.9 °F (37.2 °C) (Tympanic)   Resp 16   Ht 6' (1.829 m)   Wt 127 kg (279 lb 9.6 oz)   SpO2 96%   BMI 37.92 kg/m²    Physical Exam  Vitals reviewed.   Constitutional:       General: He is not in acute distress.     Appearance: Normal appearance. He is obese. He is not ill-appearing, toxic-appearing or diaphoretic.   HENT:      Head: Normocephalic and atraumatic.      Right Ear: External ear normal.      Left Ear: External ear normal.      Nose: No congestion or rhinorrhea.   Eyes:      General: No scleral icterus.        Right eye: No discharge.         Left eye: No discharge.      Conjunctiva/sclera: Conjunctivae normal.   Cardiovascular:      Rate and Rhythm: Tachycardia present.   Pulmonary:      Effort: Pulmonary effort is normal. No respiratory distress.   Neurological:      General: No focal deficit present.      Mental Status: He is alert and oriented to person, place, and time.   Psychiatric:         Mood and Affect: Mood normal.         Behavior: Behavior normal.         Thought Content: Thought content normal.           Alexis Edge MD  Family Medicine Physician   [facility] [place]      Administrative Statements

## 2024-09-30 NOTE — ASSESSMENT & PLAN NOTE
Lab Results   Component Value Date    HGBA1C 9.1 (A) 07/08/2024   Uncontrolled type 1 diabetes, previously followed in endocrinology, change of insurance has not seen the endocrinologist recently.    Recommend follow-up with endocrinology, has scheduled appointment with endocrinology 10/24/2024.  Currently on Lantus 50 units twice daily and Humalog with meals for carb correction. Uses CGM   Discussed extensively importance of nutritional improvement and weight loss, patient is very discouraged with weight.  Previously referred to ophthalmology and podiatry, has scheduled appointment for podiatry this month.  Return in 3 months.

## 2024-09-30 NOTE — PATIENT INSTRUCTIONS
Recommendations for bone and heart health  Take vitamin D3 5,000 units-vitamin K2 100-200 mcg combination pill daily with food to maintain high-normal levels of vitamin D (You can get them from vitamin shops or on [employer]).    Why is Vitamin D important?  Adequate vitamin D levels reduces the risk of fractures and osteoporosis.  Vitamin D is involved in modulating the immune system, enhancing the body's defense against infections and supporting overall immune function.  Adequate vitamin D levels are associated with better muscle strength and function.  Deficiency may contribute to muscle weakness and an increase risk of falls in older adults.  Multiple research studies have associated low vitamin D with increased risk of autoimmune disease, cancers including breast cancer, and multiple sclerosis.  Some research also link vitamin D deficiency to increase risk of cardiovascular disease. Vitamin D also plays a role in synthesis of various hormones, including insulin.  It may play a role in insulin sensitivity and glucose metabolism. Adequate levels of vitamin D may also plays a role in supporting mental wellbeing.  In conclusion, lets bring your vitamin D levels up to >65 to prevent many diseases and conditions!     Why is vitamin K2 important?  Vitamin K2 helps ensure that the calcium we obtain through diet/supplements is directed to the bones for proper mineralization, contributing to bone strength and density.  Vitamin K2 also helps to activate proteins that prevent calcium from depositing in the arterial walls and reducing the risk of arterial calcification. Arterial calcification leads to heart and vascular disease. Especially since we are optimizing your vitamin D levels, one of the functions for vitamin D is to absorb calcium from the gastrointestinal track. This will increase calcium levels in the body. Supplementing vitamin K2 will ensure in removing the calcium from the blood vessels. This will reduce  calcification of the vessels, reducing risk of heart and vascular disease.  Let's get your calcium where it belongs, into the bones and out of the vessels with vitamin K2

## 2024-09-30 NOTE — ASSESSMENT & PLAN NOTE
Currently not on statin.   HDL low 42, triglyceride high 206, LDL high 160  Reviewed & discussed labs including lipid panel   Triglycerides/HDL ratio: 4.90. Goal <2, 06/17/2024   Discussed importance of nutritional intake, exercising regularly.  Advised improving nutritional intake and building good habits as discussed  Goal of <70 LDL, <100 triglycerides, and >60 HDL  Monitor lipid panel, complete blood work prior to next appointment 3 months.  Orders:    Lipid panel; Future    Apolipoprotein B; Future

## 2024-10-01 ENCOUNTER — OFFICE VISIT (OUTPATIENT)
Dept: PHYSICAL THERAPY | Facility: CLINIC | Age: 44
End: 2024-10-01
Payer: COMMERCIAL

## 2024-10-01 DIAGNOSIS — R60.1 GENERALIZED EDEMA: Primary | ICD-10-CM

## 2024-10-01 DIAGNOSIS — E10.9 TYPE 1 DIABETES MELLITUS WITHOUT COMPLICATION (HCC): ICD-10-CM

## 2024-10-01 DIAGNOSIS — I89.0 LYMPHEDEMA: ICD-10-CM

## 2024-10-01 PROCEDURE — 97140 MANUAL THERAPY 1/> REGIONS: CPT

## 2024-10-01 PROCEDURE — 97110 THERAPEUTIC EXERCISES: CPT

## 2024-10-01 RX ORDER — PEN NEEDLE, DIABETIC 31 GX5/16"
NEEDLE, DISPOSABLE MISCELLANEOUS 4 TIMES DAILY
Qty: 100 EACH | Refills: 7 | Status: SHIPPED | OUTPATIENT
Start: 2024-10-01

## 2024-10-01 NOTE — PROGRESS NOTES
Daily Note     Today's date: 10/1/2024  Patient name: Otto Altman  : 1980  MRN: [id number]  Referring provider: Alexis Edge MD  Dx:   Encounter Diagnosis     ICD-10-CM    1. Generalized edema  R60.1       2. Lymphedema  I89.0                      Subjective: pt reports his legs have been feeling more swollen lately. Stated he felt good post previous session and the relief lasted until midday.       Objective: See treatment diary below      Assessment: Tolerated treatment well. Patient would benefit from continued PT. Increased swelling noted in the R leg. Decreased swelling post MLD. Educated pt about what type of compression garments to order.       Plan: Continue per plan of care.      Daily Treatment Diary    Precautions: DMI; BP slightly high but no dx (negative echocardiogram)  Co- Morbidities:   Patient Active Problem List   Diagnosis    Diabetes mellitus type 1 (HCC)    Mixed hyperlipidemia    Obesity (BMI 30-39.9)    Vitamin D deficiency    Generalized edema    Snoring    Encounter for weight management         EPOC: 10/9/24 9/25 10/1           RA DATE: 10/9/24             Manual                       MLD  [facility]                                                                                                                    Exercise Diary 9/11  9/25  10/1               THEREX            Pt Ed RB- HEP instruct and education on elevation, compression brief [facility]         Recumbent Bike                     Gastrocs Stretch                     HR/TR                     HS curls            Marches            Active HS stretch w/ ankle pumps            Ankle Pumps                                    NEURO RE-ED            STS                                                                                                  Gait Training                                                                 Modalities

## 2024-10-02 ENCOUNTER — TELEPHONE (OUTPATIENT)
Age: 44
End: 2024-10-02

## 2024-10-02 NOTE — TELEPHONE ENCOUNTER
Unfortunately, the Endocrinology provider meeting has been moved to the 24th of October so Otto's appt had to be rescheduled.    Patient requires and early morning appt so his new appt is scheduled for 17 Oct at 8:15 with Gabby.- Patient aware

## 2024-10-08 ENCOUNTER — APPOINTMENT (OUTPATIENT)
Dept: PHYSICAL THERAPY | Facility: CLINIC | Age: 44
End: 2024-10-08
Payer: COMMERCIAL

## 2024-10-14 ENCOUNTER — OFFICE VISIT (OUTPATIENT)
Dept: PHYSICAL THERAPY | Facility: CLINIC | Age: 44
End: 2024-10-14
Payer: COMMERCIAL

## 2024-10-14 DIAGNOSIS — I89.0 LYMPHEDEMA: ICD-10-CM

## 2024-10-14 DIAGNOSIS — R60.1 GENERALIZED EDEMA: Primary | ICD-10-CM

## 2024-10-14 PROCEDURE — 97140 MANUAL THERAPY 1/> REGIONS: CPT

## 2024-10-14 PROCEDURE — 97110 THERAPEUTIC EXERCISES: CPT

## 2024-10-14 NOTE — PROGRESS NOTES
PT Re-Evaluation     Collection of Subjective/Objective data performed by Barbara Lopez PTA. Assessment, POC, and Goal attainment performed by Katalina Lopez DPT.       Today's date: 10/14/2024  Patient name: Otto Altman  : 1980  MRN: [id number]  Referring provider: Alexis Edge MD  Dx:   Encounter Diagnosis     ICD-10-CM    1. Generalized edema  R60.1       2. Lymphedema  I89.0             Start Time: 815  Stop Time: 853  Total time in clinic (min): 38 minutes    Assessment  Impairments: activity intolerance, impaired physical strength, lacks appropriate home exercise program and pain with function  Other impairment: LE lymphedema    Assessment details: Patient is a 43 y/o male who presents to therapy with bilateral lower extremity lymphedema and has completed 4 visits to date. FOTO will be updated next session. Patient demonstrates subjective/objective improvement since starting PT such as improved girth measurements. Pt is currently waiting for arrival of compression garments. Patient will benefit from continued skilled PT intervention to address the aforementioned impairments, achieve goals, maximize function, and improve quality of life. Pt is in agreement with this plan.       Understanding of Dx/Px/POC: good     Prognosis: good    Goals  ST. Edema decreased by 2-3 cm in edematous areas in 4 weeks. --MET  2. Strength increased by 1/2 to 1 muscle grade in all deficient muscle groups in 4 weeks.--Progressing    LT. Decrease pain to 1-2/10 at worst by d/c.-Progressing  2. Strength increased to 5 for all deficient muscle groups by d/c.-Progressing  3. IADL performance increased to max function by d/c.-Progressing  4. Recreational performance increased to max function by d/c.-Progressing        Plan  Other planned modality interventions: other modalities PRN    Planned therapy interventions: ADL retraining, home exercise program, manual therapy, neuromuscular re-education,  patient education, strengthening, therapeutic exercise, therapeutic activities, transfer training, massage and IADL retraining  Other planned therapy interventions: other interventions PRN    Frequency: 1x/week.  Duration in weeks: 4  Plan of Care beginning date: 10/14/2024  Plan of Care expiration date: 11/11/2024  Treatment plan discussed with: patient        Subjective Evaluation    History of Present Illness  Mechanism of injury: RE (10/14/24):  Pt has noticed slight improvement the past month since starting lymphedema therapy. Pt often feels more stiff in the morning until he gets moving around more. Pt notes that he has ordered compression garments yesterday but plan on starting to wear them when he received them in the mail. Pt stated he has started to workout a little more this past week.     EVAL (9/11/24):  Pt presents to  b/l LE lymphedema which began several months of insidious onset. Pt reports a hx of similar issues previously, but exercise seemed to help.     He reports that he is less active than he should be at this time which he feels may contribute. 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     Pt reports that he had echocardiogram done 8/14- results appear negative for any acute pathology.    Pt reports some heaviness/weakness in b/l LE w/ stair climbing and chair transfers.   Patient Goals  Patient goals for therapy: decreased edema    Pain  No pain reported  Location: b/l LE    Social Support    Employment status: working (Pt works full time as a  for computers/servers, etc-spends a long time driving)        Objective     General Comments:      Ankle/Foot Comments   Negative Xavier's Sign    LE circumferential girth measurements as below (L/R):   Midpatellar:  45 cm/44.5 cm  Inferior to patella: 40 cm/40 cm  30 cm proximal to ankle: 43 cm/44.5 cm  20 cm proximal to ankle: 36.5 cm/34 cm  10 cm proximal to ankle:  28.5 cm/27.5 cm  Ankle Joint: 26 cm/26 cm  Midfoot:  24.5  cm/25 cm  MTP: 22 cm/22 cm    LE Strength (L/R):   Hip Flexion: 4/4-  Knee Flexion; 5/5  Knee Extension: 5/5  Ankle DF: 5/5  Ankle PF: 5/5      Daily Treatment Diary    Precautions: DMI; BP slightly high but no dx (negative echocardiogram)  Co- Morbidities:   Patient Active Problem List   Diagnosis    Diabetes mellitus type 1 (HCC)    Mixed hyperlipidemia    Obesity (BMI 30-39.9)    Vitamin D deficiency    Generalized edema    Snoring    Encounter for weight management         EPOC: 10/9/24 9/25 10/1 10/14          RA DATE: 10/9/24             Manual                       MLD  [facility]                                                                                                                  Exercise Diary 9/11  9/25  10/1  10/14             THEREX            Pt Ed RB- HEP instruct and education on elevation, compression brief [facility] Updated measurements         Recumbent Bike                     Gastrocs Stretch                     HR/TR                     HS curls            Marches            Active HS stretch w/ ankle pumps            Ankle Pumps                                    NEURO RE-ED            STS                                                                                                  Gait Training                                                                 Modalities

## 2024-10-16 NOTE — PROGRESS NOTES
Ambulatory Visit  Name: Otto Altman      : 1980      MRN: [id number]  Encounter Provider: LEILANI Watson  Encounter Date: 10/17/2024   Encounter department: [facility]    Assessment & Plan  Type 1 diabetes mellitus with hyperglycemia (HCC)  Patient is uncontrolled with persistent hyperglycemia. He has significant insulin resistance as evidenced by TDD of insulin at times reaching 180 units.   Patient is interested in starting an insulin pump. He would prefer the omnipod pump but also understands that he will need to change his pod more frequently due to high insulin needs. He understands that his weight is contributing to his insulin resistance but hesitant to start any medication (GLP-1) for this. Counseled on the importance of reducing calorie intake and increasing physical activity. We will add metformin to assist with reduction of exogenous insulin needs. No changes to insulin regimen today. Should patient be unable to tolerate metformin or find it ineffective, recommend increasing Lantus to 60 units twice daily. I have reached out to the [employer] representatives to assist him further. Continue IC 1:5 and ISF 20 with goal  mg/dL. Patient knows to notify me with persistent hyperglycemia or episodes of hypoglycemia.  F/U in 3 months.   Lab Results   Component Value Date    HGBA1C 9 (A) 10/17/2024       Orders:    POCT hemoglobin A1c    Hemoglobin A1C; Future    Albumin / creatinine urine ratio; Future    Basic metabolic panel; Future    insulin lispro (HumaLOG) 100 units/mL injection pen; Use 1u/5gm carb plus 1u/25mg/dL above 120mg/dL; max 80u/day    Lantus SoloStar 100 units/mL SOPN; Inject 50 units under the skin every 12 hours    Insulin resistance  Patient currently requiring high doses of insulin without achieving glycemic control.  Start metformin extended release 500 mg twice daily.  Reviewed mechanism of action as well as  potential side effects including diarrhea and GI upset.  Take 500 mg daily with breakfast for 5 to 7 days.  If tolerating, can start 500 mg twice daily with meals.  Patient is to notify me should he experience any side effects or if he does not notice a reduction in his exogenous insulin needs.  Orders:    metFORMIN (GLUCOPHAGE-XR) 500 mg 24 hr tablet; Take 1 tablet (500 mg total) by mouth 2 (two) times a day with meals    Obesity (BMI 30-39.9)  Counseled on relationship between obesity and insulin resistance.  Patient would be a candidate for GLP-1 Wegovy or Zepbound to assist.  No history of pancreatitis, medullary thyroid cancer, or M EN-2.  Will continue to address.           History of Present Illness     Otto Altman is a 44 y.o. male with type 1 diabetes with long term use of insulin and signs of insulin resistance presenting to the office today for follow up.    Initially diagnosed at age 7. Complications of diabetes include PN, albuminuria, retinopathy (?). Denies macrovascular complications.    Had previously used a groopify but was not happy with this.     Patient was last seen on 7/8/2024. At that time, he did not have any blood sugars for review. A CGM- freestyle milly 3 was ordered for him.     Current regimen:    Lantus 50 units twice daily  Novolog IC 1:5; ISF 25  Goal       POC HgA1C today is 9%.     Otto Altman   Device used Freestyle milly 3  Home use       Indication   Type 1 Diabetes    More than 72 hours of data was reviewed. Report to be scanned to chart.     Date Range: October 4, 2024-October 17, 2024    Analysis of data:   Average Glucose: 210 mg/dL  Coefficient of Variation: 32.5%  GMI: 8.3%  Time in Target Range: 31%  Time Above Range: 40% 181 to 250 mg/dL; 28% greater than 250 mg/dL  Time Below Range: 1% 54 to 69 mg/dL; 0% less than 54 mg/dL    Interpretation of data: uncontrolled with persistent hyperglycemia.           Review of Systems   Constitutional:  Positive for  fatigue. Negative for activity change, appetite change and unexpected weight change.   Eyes:  Negative for visual disturbance.   Respiratory:  Negative for cough, chest tightness and shortness of breath.    Cardiovascular:  Negative for chest pain, palpitations and leg swelling.   Gastrointestinal:  Negative for constipation, diarrhea, nausea and vomiting.   Endocrine: Negative for polydipsia, polyphagia and polyuria.   Genitourinary:  Negative for frequency.   Musculoskeletal:  Negative for arthralgias, back pain, gait problem and myalgias.   Skin:  Negative for wound.   Allergic/Immunologic: Negative for environmental allergies and food allergies.   Neurological:  Positive for numbness. Negative for dizziness, weakness, light-headedness and headaches.   Psychiatric/Behavioral:  Negative for decreased concentration, dysphoric mood and sleep disturbance. The patient is not nervous/anxious.            Objective     /80 (BP Location: Right arm, Patient Position: Sitting, Cuff Size: Standard)   Pulse (!) 116   Temp 97.7 °F (36.5 °C)   Ht 6' (1.829 m)   Wt 124 kg (274 lb)   SpO2 94%   BMI 37.16 kg/m²     Physical Exam  Vitals reviewed.   Constitutional:       General: He is not in acute distress.     Appearance: He is well-developed. He is obese. He is not ill-appearing.   HENT:      Head: Normocephalic and atraumatic.   Eyes:      Conjunctiva/sclera: Conjunctivae normal.      Pupils: Pupils are equal, round, and reactive to light.   Cardiovascular:      Pulses: Normal pulses.   Pulmonary:      Effort: Pulmonary effort is normal. No respiratory distress.   Abdominal:      Palpations: Abdomen is soft.      Tenderness: There is no abdominal tenderness.   Musculoskeletal:      Cervical back: Normal range of motion.      Right lower leg: No edema.      Left lower leg: No edema.   Skin:     General: Skin is warm and dry.      Capillary Refill: Capillary refill takes less than 2 seconds.   Neurological:       Mental Status: He is alert and oriented to person, place, and time.   Psychiatric:         Mood and Affect: Mood normal.

## 2024-10-17 ENCOUNTER — OFFICE VISIT (OUTPATIENT)
Age: 44
End: 2024-10-17
Payer: COMMERCIAL

## 2024-10-17 VITALS
OXYGEN SATURATION: 94 % | SYSTOLIC BLOOD PRESSURE: 128 MMHG | HEART RATE: 116 BPM | DIASTOLIC BLOOD PRESSURE: 80 MMHG | BODY MASS INDEX: 37.11 KG/M2 | WEIGHT: 274 LBS | TEMPERATURE: 97.7 F | HEIGHT: 72 IN

## 2024-10-17 DIAGNOSIS — E88.819 INSULIN RESISTANCE: ICD-10-CM

## 2024-10-17 DIAGNOSIS — E10.65 TYPE 1 DIABETES MELLITUS WITH HYPERGLYCEMIA (HCC): Primary | ICD-10-CM

## 2024-10-17 DIAGNOSIS — E66.9 OBESITY (BMI 30-39.9): ICD-10-CM

## 2024-10-17 LAB — SL AMB POCT HEMOGLOBIN AIC: 9 (ref ?–6.5)

## 2024-10-17 PROCEDURE — 99214 OFFICE O/P EST MOD 30 MIN: CPT | Performed by: NURSE PRACTITIONER

## 2024-10-17 PROCEDURE — 83036 HEMOGLOBIN GLYCOSYLATED A1C: CPT | Performed by: NURSE PRACTITIONER

## 2024-10-17 PROCEDURE — 95251 CONT GLUC MNTR ANALYSIS I&R: CPT | Performed by: NURSE PRACTITIONER

## 2024-10-17 RX ORDER — METFORMIN HYDROCHLORIDE 500 MG/1
500 TABLET, EXTENDED RELEASE ORAL 2 TIMES DAILY WITH MEALS
Qty: 180 TABLET | Refills: 1 | Status: SHIPPED | OUTPATIENT
Start: 2024-10-17 | End: 2025-04-15

## 2024-10-17 RX ORDER — INSULIN GLARGINE 100 [IU]/ML
INJECTION, SOLUTION SUBCUTANEOUS
Qty: 30 ML | Refills: 3 | Status: SHIPPED | OUTPATIENT
Start: 2024-10-17

## 2024-10-17 RX ORDER — INSULIN LISPRO 100 [IU]/ML
INJECTION, SOLUTION INTRAVENOUS; SUBCUTANEOUS
Qty: 24 ML | Refills: 3 | Status: SHIPPED | OUTPATIENT
Start: 2024-10-17

## 2024-10-17 NOTE — ASSESSMENT & PLAN NOTE
Patient remains poorly controlled with persistent hyperglycemia. Patient demonstrates significant signs of insulin resistance.   Lab Results   Component Value Date    HGBA1C 9 (A) 10/17/2024       Orders:    POCT hemoglobin A1c    Hemoglobin A1C; Future    Albumin / creatinine urine ratio; Future    Basic metabolic panel; Future

## 2024-10-17 NOTE — ASSESSMENT & PLAN NOTE
Patient currently requiring high doses of insulin without achieving glycemic control.  Start metformin extended release 500 mg twice daily.  Reviewed mechanism of action as well as potential side effects including diarrhea and GI upset.  Take 500 mg daily with breakfast for 5 to 7 days.  If tolerating, can start 500 mg twice daily with meals.  Patient is to notify me should he experience any side effects or if he does not notice a reduction in his exogenous insulin needs.  Orders:    metFORMIN (GLUCOPHAGE-XR) 500 mg 24 hr tablet; Take 1 tablet (500 mg total) by mouth 2 (two) times a day with meals

## 2024-10-17 NOTE — ASSESSMENT & PLAN NOTE
Patient is uncontrolled with persistent hyperglycemia. He has significant insulin resistance as evidenced by TDD of insulin at times reaching 180 units.   Patient is interested in starting an insulin pump. He would prefer the omnipod pump but also understands that he will need to change his pod more frequently due to high insulin needs. He understands that his weight is contributing to his insulin resistance but hesitant to start any medication (GLP-1) for this. Counseled on the importance of reducing calorie intake and increasing physical activity. We will add metformin to assist with reduction of exogenous insulin needs. No changes to insulin regimen today. Should patient be unable to tolerate metformin or find it ineffective, recommend increasing Lantus to 60 units twice daily. I have reached out to the [employer] representatives to assist him further. Continue IC 1:5 and ISF 20 with goal  mg/dL. Patient knows to notify me with persistent hyperglycemia or episodes of hypoglycemia.  F/U in 3 months.   Lab Results   Component Value Date    HGBA1C 9 (A) 10/17/2024       Orders:    POCT hemoglobin A1c    Hemoglobin A1C; Future    Albumin / creatinine urine ratio; Future    Basic metabolic panel; Future    insulin lispro (HumaLOG) 100 units/mL injection pen; Use 1u/5gm carb plus 1u/25mg/dL above 120mg/dL; max 80u/day    Lantus SoloStar 100 units/mL SOPN; Inject 50 units under the skin every 12 hours

## 2024-10-17 NOTE — ASSESSMENT & PLAN NOTE
Counseled on relationship between obesity and insulin resistance.  Patient would be a candidate for GLP-1 Wegovy or Zepbound to assist.  No history of pancreatitis, medullary thyroid cancer, or M EN-2.  Will continue to address.

## 2024-10-20 DIAGNOSIS — E78.2 MIXED HYPERLIPIDEMIA: ICD-10-CM

## 2024-10-21 RX ORDER — ATORVASTATIN CALCIUM 40 MG/1
TABLET, FILM COATED ORAL
Qty: 90 TABLET | Refills: 1 | Status: SHIPPED | OUTPATIENT
Start: 2024-10-21

## 2024-10-25 ENCOUNTER — OFFICE VISIT (OUTPATIENT)
Dept: PHYSICAL THERAPY | Facility: CLINIC | Age: 44
End: 2024-10-25
Payer: COMMERCIAL

## 2024-10-25 DIAGNOSIS — R60.1 GENERALIZED EDEMA: Primary | ICD-10-CM

## 2024-10-25 DIAGNOSIS — I89.0 LYMPHEDEMA: ICD-10-CM

## 2024-10-25 PROCEDURE — 97110 THERAPEUTIC EXERCISES: CPT

## 2024-10-25 NOTE — PROGRESS NOTES
Daily Note     Today's date: 10/25/2024  Patient name: Otto Altman  : 1980  MRN: [id number]  Referring provider: Alexis Edge MD  Dx:   Encounter Diagnosis     ICD-10-CM    1. Generalized edema  R60.1       2. Lymphedema  I89.0                      Subjective: pt reports he has started to wear the compression garments a week ago. Stated the compression garments help some but significant symptoms remain including discomfort and swelling, especially towards the end of the day.       Objective: See treatment diary below      Assessment: Tolerated treatment well. Patient would benefit from continued PT. Tried the compression pump this visit. Educated pt while on pump about the benefits of compression pump for long-term management of lymphedema symptoms. Educated pt about compression, elevation, and exercise. 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 Discussed how to use the pump and how often to use the pump. Pt would benefit from compression pump at home to help manage sx long-term.       Plan: Continue per plan of care.      Daily Treatment Diary    Precautions: DMI; BP slightly high but no dx (negative echocardiogram)  Co- Morbidities:   Patient Active Problem List   Diagnosis    Diabetes mellitus type 1 (HCC)    Mixed hyperlipidemia    Obesity (BMI 30-39.9)    Vitamin D deficiency    Generalized edema    Snoring    Encounter for weight management         EPOC: 10/9/24 9/25 10/1 10/14 10/25         RA DATE: 10/9/24             Manual                       MLD  [facility]                                                                                                                 Exercise Diary 9/11  9/25  10/1  10/14  10/25           THEREX            Pt Ed RB- HEP instruct and education on elevation, compression brief [facility] Updated measurements  JH while on pump       Recumbent Bike                     Gastrocs Stretch                      HR/TR                     HS curls            Marches            Active HS stretch w/ ankle pumps            Ankle Pumps                                    NEURO RE-ED            STS                                                                                                  Gait Training                                                                 Modalities

## 2024-10-30 ENCOUNTER — APPOINTMENT (OUTPATIENT)
Dept: PHYSICAL THERAPY | Facility: CLINIC | Age: 44
End: 2024-10-30
Payer: COMMERCIAL

## 2024-11-06 ENCOUNTER — OFFICE VISIT (OUTPATIENT)
Dept: PHYSICAL THERAPY | Facility: CLINIC | Age: 44
End: 2024-11-06
Payer: COMMERCIAL

## 2024-11-06 DIAGNOSIS — R60.1 GENERALIZED EDEMA: Primary | ICD-10-CM

## 2024-11-06 DIAGNOSIS — I89.0 LYMPHEDEMA: ICD-10-CM

## 2024-11-06 PROCEDURE — 97140 MANUAL THERAPY 1/> REGIONS: CPT

## 2024-11-06 PROCEDURE — 97110 THERAPEUTIC EXERCISES: CPT

## 2024-11-06 NOTE — PROGRESS NOTES
Daily Note     Today's date: 2024  Patient name: Otto Altman  : 1980  MRN: [id number]  Referring provider: Alexis Edge MD  Dx:   Encounter Diagnosis     ICD-10-CM    1. Generalized edema  R60.1       2. Lymphedema  I89.0                      Subjective: pt reports he was started on Met henri and been wearing the compression garments daily and has been seeing some improvements. Decreased swelling in the legs overall. However, does still feel swelling increased occasionally depending on how long he is sitting.       Objective: See treatment diary below      Assessment: Tolerated treatment well. Patient would benefit from continued PT. Pt has decreased swelling noted in BL legs overall. Discussed we are getting compression pump for home to help manage swelling long-term.       Plan: Continue per plan of care.      Daily Treatment Diary    Precautions: DMI; BP slightly high but no dx (negative echocardiogram)  Co- Morbidities:   Patient Active Problem List   Diagnosis    Diabetes mellitus type 1 (HCC)    Mixed hyperlipidemia    Obesity (BMI 30-39.9)    Vitamin D deficiency    Generalized edema    Snoring    Encounter for weight management         EPOC: 10/9/24 9/25 10/1 10/14 10/25 11/6        RA DATE: 10/9/24             Manual                       MLD  [facility]                                                                                                              Exercise Diary 9/11  9/25  10/1  10/14  10/25  11/6         THEREX            Pt Ed RB- HEP instruct and education on elevation, compression brief [facility] Updated measurements  JH while on pump JH      Recumbent Bike                     Gastrocs Stretch                     HR/TR                     HS curls            Marches            Active HS stretch w/ ankle pumps            Ankle Pumps                                    NEURO RE-ED            STS                                                                                                   Gait Training                                                                 Modalities

## 2024-11-12 ENCOUNTER — OFFICE VISIT (OUTPATIENT)
Dept: CARDIOLOGY CLINIC | Facility: CLINIC | Age: 44
End: 2024-11-12
Payer: COMMERCIAL

## 2024-11-12 VITALS
BODY MASS INDEX: 36.84 KG/M2 | OXYGEN SATURATION: 96 % | HEIGHT: 72 IN | WEIGHT: 272 LBS | DIASTOLIC BLOOD PRESSURE: 80 MMHG | HEART RATE: 106 BPM | RESPIRATION RATE: 16 BRPM | SYSTOLIC BLOOD PRESSURE: 126 MMHG

## 2024-11-12 DIAGNOSIS — R00.0 TACHYCARDIA: Primary | ICD-10-CM

## 2024-11-12 DIAGNOSIS — R94.31 ABNORMAL EKG: ICD-10-CM

## 2024-11-12 DIAGNOSIS — E78.2 MIXED HYPERLIPIDEMIA: ICD-10-CM

## 2024-11-12 DIAGNOSIS — R06.02 SHORTNESS OF BREATH: ICD-10-CM

## 2024-11-12 PROCEDURE — 99203 OFFICE O/P NEW LOW 30 MIN: CPT | Performed by: INTERNAL MEDICINE

## 2024-11-12 PROCEDURE — 93000 ELECTROCARDIOGRAM COMPLETE: CPT | Performed by: INTERNAL MEDICINE

## 2024-11-12 NOTE — PROGRESS NOTES
Cardiology Consultation     Otto Altman  [id number]  1980  [address] [facility] [place]  [address]  [place] [address]    This patient presents for cardiology consultation and evaluation.  Patient does have a history of type 1 diabetes mellitus.  Patient also has history of hyperlipidemia.  Patient has been noted to have resting tachycardia for the past 2 years.  Patient had COVID at that time.  Patient denies any history of coronary artery disease or MI in the past.  Patient could have sleep apnea which is being evaluated as outpatient.  Patient denies any history of chest pain.  Patient does have some shortness of breath and decreased exertional tolerance in the recent past.  No history of functional cardiac evaluation.  No history of palpitations dizziness presyncope syncope.  He was started on metoprolol succinate by primary care physician but he has not been regularly taking that yet.    1. Tachycardia  POCT ECG      2. Abnormal EKG  Echo stress test, exercise      3. Mixed hyperlipidemia        4. Shortness of breath  Echo stress test, exercise        Patient Active Problem List   Diagnosis    Diabetes mellitus type 1 (HCC)    Mixed hyperlipidemia    Obesity (BMI 30-39.9)    Vitamin D deficiency    Generalized edema    Snoring    Encounter for weight management    Insulin resistance     Past Medical History:   Diagnosis Date    Diabetes mellitus type 1 (HCC)      Social History     Socioeconomic History    Marital status: /Civil Union     Spouse name: Not on file    Number of children: Not on file    Years of education: Not on file    Highest education level: Not on file   Occupational History    Not on file   Tobacco Use    Smoking status: Never    Smokeless tobacco: Never   Vaping Use    Vaping status: Never Used   Substance and Sexual Activity    Alcohol use: Yes     Comment: on occasion     Drug use: Never    Sexual activity: Yes     Partners: Female   Other Topics Concern    Not on file   Social History Narrative    Not on file     Social Determinants of Health     Financial Resource Strain: Not on file   Food Insecurity: Not on file   Transportation Needs: Not on file   Physical Activity: Inactive (3/29/2021)    Exercise Vital Sign     Days of Exercise per Week: 0 days     Minutes of Exercise per Session: 0 min   Stress: Stress Concern Present (9/14/2023)    [employer] - Occupational Stress Questionnaire     Feeling of Stress : To some extent   Social Connections: Not on file   Intimate Partner Violence: Not on file   Housing Stability: Not on file      Family History   Problem Relation Age of Onset    Diabetes Mother     Diabetes Father      Past Surgical History:   Procedure Laterality Date    COLONOSCOPY      NO PAST SURGERIES         Current Outpatient Medications:     atorvastatin (LIPITOR) 40 mg tablet, TAKE 1 TABLET BY MOUTH EVERY DAY, Disp: 90 tablet, Rfl: 1    B-D ULTRAFINE III SHORT PEN 31G X 8 MM MISC, INJECT UNDER THE SKIN 4 (FOUR) TIMES A DAY, Disp: 100 each, Rfl: 7    carbamide peroxide (DEBROX) 6.5 % otic solution, Administer 5 drops to the right ear 2 (two) times a day, Disp: 15 mL, Rfl: 0    Continuous Glucose  (FreeStyle Demond 3 Weir) POLA, Use to monitor blood sugar continuously, Disp: 1 each, Rfl: 0    Continuous Glucose Sensor (FreeStyle Demond 3 Sensor) MISC, Use to check your blood sugar continuously and change every 2 weeks, Disp: 2 each, Rfl: 11    insulin lispro (HumaLOG) 100 units/mL injection pen, Use 1u/5gm carb plus 1u/25mg/dL above 120mg/dL; max 80u/day, Disp: 24 mL, Rfl: 3    Lantus SoloStar 100 units/mL SOPN, Inject 50 units under the skin every 12 hours, Disp: 30 mL, Rfl: 3    metFORMIN (GLUCOPHAGE-XR) 500 mg 24 hr tablet, Take 1 tablet (500 mg total) by mouth 2 (two) times a day with meals, Disp: 180 tablet, Rfl: 1     metoprolol succinate (TOPROL-XL) 25 mg 24 hr tablet, Take 1 tablet (25 mg total) by mouth daily, Disp: 90 tablet, Rfl: 1    Multiple Vitamins-Minerals (MULTIVITAMIN ADULT PO), Take by mouth, Disp: , Rfl:     Omega-3 Fatty Acids (fish oil) 1,000 mg, Take 1,000 mg by mouth daily, Disp: , Rfl:     OneTouch Verio test strip, USE 1 EACH 4 (FOUR) TIMES A DAY, Disp: 100 strip, Rfl: 9  No Known Allergies  Vitals:    11/12/24 0844   BP: 126/80   BP Location: Left arm   Patient Position: Sitting   Cuff Size: Large   Pulse: (!) 106   Resp: 16   SpO2: 96%   Weight: 123 kg (272 lb)   Height: 6' (1.829 m)       Labs:  Office Visit on 10/17/2024   Component Date Value    Hemoglobin A1C 10/17/2024 9 (A)    Appointment on 09/27/2024   Component Date Value    Vit D, 25-Hydroxy 09/27/2024 59.4     Total Bilirubin 09/27/2024 0.62     Bilirubin, Direct 09/27/2024 0.16     Alkaline Phosphatase 09/27/2024 79     AST 09/27/2024 20     ALT 09/27/2024 32     Total Protein 09/27/2024 7.5     Albumin 09/27/2024 4.3      Imaging: No results found.    Review of Systems:  Review of Systems  REVIEW OF SYSTEMS:  Constitutional:  Denies fever or chills   Eyes:  Denies change in visual acuity   HENT:  Denies nasal congestion or sore throat   Respiratory:  shortness of breath   Cardiovascular:  Denies chest pain or edema   GI:  Denies abdominal pain, nausea, vomiting, bloody stools or diarrhea   :  Denies dysuria, frequency, difficulty in micturition and nocturia  Musculoskeletal:  Denies back pain or joint pain   Neurologic:  Denies headache, focal weakness or sensory changes   Endocrine:  Denies polyuria or polydipsia   Lymphatic:  Denies swollen glands   Psychiatric:  Denies depression or anxiety    Physical Exam:  Physical Exam  PHYSICAL EXAM:  General:  Patient is not in acute distress   Head: Normocephalic, Atraumatic.  HEENT:  Both pupils normal-size atraumatic, normocephalic, nonicteric  Neck:  JVP not raised. Trachea central. No carotid  bruit  Respiratory:  normal breath sounds no crackles. no rhonchi  Cardiovascular:  Regular rate and rhythm no S3 no murmurs  GI:  Abdomen soft nontender. No organomegaly.   Lymphatic:  No cervical or inguinal lymphadenopathy  Neurologic:  Patient is awake alert, oriented . Grossly nonfocal  Extremities no edema    EKG shows sinus rhythm PVC nonspecific T wave abnormalities      Discussion/Summary:    This patient has history of type 1 diabetes mellitus as well as hyperlipidemia and does have some shortness of breath with exertion.  Patient also has resting tachycardia.  Thyroid function test in the past have been normal.    Patient could very well have sleep apnea with snoring and apnea and is undergoing outpatient evaluation to further evaluate the same.    Patient encouraged to follow through with this.  Echocardiogram done this year showed normal ejection fraction with no significant valvular abnormalities.    Patient will be scheduled for a stress echocardiogram to evaluate for exercise capacity as well as ischemia.  Sensitivity and specificity as well as limitations of different modalities of cardiac imaging discussed at length.  Symptoms to watch out from cardiac standpoint which would indicate the need for further cardiac evaluation also discussed.    Patient instructed to be compliant with his metoprolol succinate 25 mg p.o. daily.  Report any side effects.    Consider CT coronary calcium score if the stress test shows no evidence of ischemia.    Plan of care has been discussed with patient and he is agreeable with the plan of care.  I will see him back in 6 months.

## 2024-11-17 DIAGNOSIS — E10.65 TYPE 1 DIABETES MELLITUS WITH HYPERGLYCEMIA (HCC): ICD-10-CM

## 2024-11-18 RX ORDER — INSULIN GLARGINE 100 [IU]/ML
INJECTION, SOLUTION SUBCUTANEOUS
Qty: 90 ML | Refills: 1 | Status: SHIPPED | OUTPATIENT
Start: 2024-11-18

## 2024-11-20 ENCOUNTER — APPOINTMENT (OUTPATIENT)
Dept: PHYSICAL THERAPY | Facility: CLINIC | Age: 44
End: 2024-11-20
Payer: COMMERCIAL

## 2024-11-26 ENCOUNTER — APPOINTMENT (OUTPATIENT)
Dept: PHYSICAL THERAPY | Facility: CLINIC | Age: 44
End: 2024-11-26
Payer: COMMERCIAL

## 2024-12-02 ENCOUNTER — TELEPHONE (OUTPATIENT)
Dept: NON INVASIVE DIAGNOSTICS | Facility: HOSPITAL | Age: 44
End: 2024-12-02

## 2024-12-02 DIAGNOSIS — R06.02 SOB (SHORTNESS OF BREATH) ON EXERTION: Primary | ICD-10-CM

## 2024-12-02 NOTE — TELEPHONE ENCOUNTER
Peer to peer completed for echo stress test, echo stress test was denied by patient's insurance.  Exercise stress test was instead recommended.  Discussed with patient's cardiologist Dr. Toussaint, who is in agreement with exercise stress test.  Orders placed.  Message sent to office clinical staff to inform patient of the above.

## 2024-12-03 ENCOUNTER — TELEPHONE (OUTPATIENT)
Dept: CARDIOLOGY CLINIC | Facility: CLINIC | Age: 44
End: 2024-12-03

## 2024-12-03 NOTE — TELEPHONE ENCOUNTER
Tequila Enriquez PA-C  [facility]  Cc: Nisha Burnham PA-C  Please advise patient his insurance declines echo stress test.  I have instead placed orders for him to complete an exercise stress test.  Please provide him with information regarding scheduling.  He should report any symptoms out of the ordinary.

## 2025-01-28 DIAGNOSIS — E10.65 TYPE 1 DIABETES MELLITUS WITH HYPERGLYCEMIA (HCC): ICD-10-CM

## 2025-01-29 RX ORDER — INSULIN LISPRO 100 [IU]/ML
INJECTION, SOLUTION INTRAVENOUS; SUBCUTANEOUS
Qty: 30 ML | Refills: 5 | Status: SHIPPED | OUTPATIENT
Start: 2025-01-29

## 2025-02-03 ENCOUNTER — OFFICE VISIT (OUTPATIENT)
Age: 45
End: 2025-02-03
Payer: COMMERCIAL

## 2025-02-03 VITALS
BODY MASS INDEX: 36.79 KG/M2 | HEIGHT: 72 IN | DIASTOLIC BLOOD PRESSURE: 70 MMHG | TEMPERATURE: 94.8 F | OXYGEN SATURATION: 97 % | SYSTOLIC BLOOD PRESSURE: 110 MMHG | HEART RATE: 113 BPM | WEIGHT: 271.6 LBS

## 2025-02-03 DIAGNOSIS — E10.65 TYPE 1 DIABETES MELLITUS WITH HYPERGLYCEMIA (HCC): Primary | ICD-10-CM

## 2025-02-03 DIAGNOSIS — R60.1 GENERALIZED EDEMA: ICD-10-CM

## 2025-02-03 DIAGNOSIS — Z12.5 PROSTATE CANCER SCREENING: ICD-10-CM

## 2025-02-03 DIAGNOSIS — Z13.6 ENCOUNTER FOR LIPID SCREENING FOR CARDIOVASCULAR DISEASE: ICD-10-CM

## 2025-02-03 DIAGNOSIS — I10 ESSENTIAL HYPERTENSION: ICD-10-CM

## 2025-02-03 DIAGNOSIS — E78.2 MIXED HYPERLIPIDEMIA: ICD-10-CM

## 2025-02-03 DIAGNOSIS — Z13.220 ENCOUNTER FOR LIPID SCREENING FOR CARDIOVASCULAR DISEASE: ICD-10-CM

## 2025-02-03 DIAGNOSIS — E55.9 VITAMIN D INSUFFICIENCY: ICD-10-CM

## 2025-02-03 DIAGNOSIS — M75.01 ADHESIVE CAPSULITIS OF RIGHT SHOULDER: ICD-10-CM

## 2025-02-03 DIAGNOSIS — E66.812 OBESITY, CLASS II, BMI 35-39.9: ICD-10-CM

## 2025-02-03 PROCEDURE — 99214 OFFICE O/P EST MOD 30 MIN: CPT | Performed by: FAMILY MEDICINE

## 2025-02-03 NOTE — ASSESSMENT & PLAN NOTE
Lab Results   Component Value Date    HGBA1C 9 (A) 10/17/2024   Following endocrinology  Currently prescribed the following:  Lantus 50 units twice daily  Metformin  mg twice daily after meals  Reports taking as prescribed.  Currently on CGM and monitors regularly.   Previously seen by endocrinology and started on metformin for insulin resistance  Currently on statin & not on ACEi/ARB  Assessment: Generalized edema appears to be improving, needs updated labs to evaluate A1c .   Med changes: None. Continue current regimen and working on lifestyle improvement.   Advised and reviewed:  Low-carb, low-sodium diet and limit snacking, really working on building good habits with nutritional intake and exercising regularly.   Hypoglycemia protocol, will monitor and discuss frequency at each apt with plan to adjust management to avoid occurences   Return in 4 months for close monitoring.     Orders:  •  IRIS Diabetic eye exam  •  Hemoglobin A1C  •  Albumin / creatinine urine ratio; Future

## 2025-02-03 NOTE — ASSESSMENT & PLAN NOTE
06/17/2024 Previously reviewed labs.  Currently prescribed the following:  atorvastatin 40 mg daily  Reports taking as prescribed.  Assessment: HDL 42, triglyceride 206, , LDL remaining high.  Will get updated labs to evaluate update  Atorvastatin was started after last blood work showing elevated LDL.  Med changes: None. Continue current regimen and working on lifestyle improvement.    Advised improving nutritional intake and exercising regularly, really focusing on building good habits as discussed.  Goal of <60 Apolipoprotein B & LDL, <100 triglycerides, and >60 HDL  Monitor lipid panel    Orders:  •  TSH, 3rd generation with Free T4 reflex  •  Lipid Panel with Direct LDL reflex  •  Apolipoprotein B  •  Lipoprotein A (LPA)

## 2025-02-03 NOTE — ASSESSMENT & PLAN NOTE
Improving.   Orders:  •  Comprehensive metabolic panel  •  CBC and differential  •  TSH, 3rd generation with Free T4 reflex

## 2025-02-03 NOTE — ASSESSMENT & PLAN NOTE
Wt Readings from Last 3 Encounters:   02/03/25 123 kg (271 lb 9.6 oz)   11/12/24 123 kg (272 lb)   10/17/24 124 kg (274 lb)     Initial weight (05/07/24): 282 lbs, Body mass index is 39.44 kg/m²   TWL: -11 lbs  Currently working on lifestyle improvement only.  On insulin for type 1 diabetes and recently started on metformin  Metformin  mg twice daily with meals  Reports taking as prescribed.  GI side effects has improved since initial but continues to have some cramping.  Assessment: Improving weight/BMI.  Needs to work on nutritional intake, incorporating more movement throughout the day, and building consistency with a regular exercise routine  Med management:Refer to diabetic problem list.   Recommended focusing on building good habits over time by setting and being consistent with realistic goals.   Improve nutritional intake (recommended Mediterranean diet, low-carb diet)  Calorie control (creating a modest calorie deficit of 500-1000 -calorie/day)  Monitoring portion size and frequency of intake, limiting snacking as discussed  Aiming for Whole Foods and healthy fats  Limiting added sugars and processed foods as discussed  Exercising and move body throughout the day and regularly as discussed  Prioritize sleep and mental health  Return in 4 months for close monitoring.  Complete blood work in the meantime.

## 2025-02-03 NOTE — ASSESSMENT & PLAN NOTE
BP Readings from Last 3 Encounters:   02/03/25 110/70   11/12/24 126/80   10/17/24 128/80      Following cardiology  Currently prescribed the following:  Metoprolol succinate 25 mg daily  Reports taking as prescribed.  Previously started patient on metoprolol due to blood pressures consistently uncontrolled.  Assessment: Controlled  Med changes: None. Continue current regimen and working on lifestyle improvement.   Lifestyle modifications recommended, including reduced sodium intake, regular exercise, maintaining a healthy weight, limiting alcohol consumption, and/or avoiding cig smoking.  Return in 4 months for close monitoring.

## 2025-02-03 NOTE — PROGRESS NOTES
[facility]  Ambulatory Office Visit     PATIENT INFORMATION   Name: Otto Altman   YOB: 1980   MRN: [id number]  Encounter Provider: Alexis Edge MD    Encounter Date: 2/3/2025    ASSESSMENT & PLAN     Assessment & Plan  Type 1 diabetes mellitus with hyperglycemia (HCC)    Lab Results   Component Value Date    HGBA1C 9 (A) 10/17/2024   Following endocrinology  Currently prescribed the following:  Lantus 50 units twice daily  Metformin  mg twice daily after meals  Reports taking as prescribed.  Currently on CGM and monitors regularly.   Previously seen by endocrinology and started on metformin for insulin resistance  Currently on statin & not on ACEi/ARB  Assessment: Generalized edema appears to be improving, needs updated labs to evaluate A1c .   Med changes: None. Continue current regimen and working on lifestyle improvement.   Advised and reviewed:  Low-carb, low-sodium diet and limit snacking, really working on building good habits with nutritional intake and exercising regularly.   Hypoglycemia protocol, will monitor and discuss frequency at each apt with plan to adjust management to avoid occurences   Return in 4 months for close monitoring.     Orders:  •  IRIS Diabetic eye exam  •  Hemoglobin A1C  •  Albumin / creatinine urine ratio; Future    Generalized edema  Improving.   Orders:  •  Comprehensive metabolic panel  •  CBC and differential  •  TSH, 3rd generation with Free T4 reflex    Mixed hyperlipidemia  06/17/2024 Previously reviewed labs.  Currently prescribed the following:  atorvastatin 40 mg daily  Reports taking as prescribed.  Assessment: HDL 42, triglyceride 206, , LDL remaining high.  Will get updated labs to evaluate update  Atorvastatin was started after last blood work showing elevated LDL.  Med changes: None. Continue current regimen and working on lifestyle improvement.    Advised improving nutritional intake and exercising regularly, really  focusing on building good habits as discussed.  Goal of <60 Apolipoprotein B & LDL, <100 triglycerides, and >60 HDL  Monitor lipid panel    Orders:  •  TSH, 3rd generation with Free T4 reflex  •  Lipid Panel with Direct LDL reflex  •  Apolipoprotein B  •  Lipoprotein A (LPA)    Obesity, Class II, BMI 35-39.9  Wt Readings from Last 3 Encounters:   02/03/25 123 kg (271 lb 9.6 oz)   11/12/24 123 kg (272 lb)   10/17/24 124 kg (274 lb)     Initial weight (05/07/24): 282 lbs, Body mass index is 39.44 kg/m²   TWL: -11 lbs  Currently working on lifestyle improvement only.  On insulin for type 1 diabetes and recently started on metformin  Metformin  mg twice daily with meals  Reports taking as prescribed.  GI side effects has improved since initial but continues to have some cramping.  Assessment: Improving weight/BMI.  Needs to work on nutritional intake, incorporating more movement throughout the day, and building consistency with a regular exercise routine  Med management:Refer to diabetic problem list.   Recommended focusing on building good habits over time by setting and being consistent with realistic goals.   Improve nutritional intake (recommended Mediterranean diet, low-carb diet)  Calorie control (creating a modest calorie deficit of 500-1000 -calorie/day)  Monitoring portion size and frequency of intake, limiting snacking as discussed  Aiming for Whole Foods and healthy fats  Limiting added sugars and processed foods as discussed  Exercising and move body throughout the day and regularly as discussed  Prioritize sleep and mental health  Return in 4 months for close monitoring.  Complete blood work in the meantime.          Essential hypertension  BP Readings from Last 3 Encounters:   02/03/25 110/70   11/12/24 126/80   10/17/24 128/80      Following cardiology  Currently prescribed the following:  Metoprolol succinate 25 mg daily  Reports taking as prescribed.  Previously started patient on metoprolol due  to blood pressures consistently uncontrolled.  Assessment: Controlled  Med changes: None. Continue current regimen and working on lifestyle improvement.   Lifestyle modifications recommended, including reduced sodium intake, regular exercise, maintaining a healthy weight, limiting alcohol consumption, and/or avoiding cig smoking.  Return in 4 months for close monitoring.        Vitamin D insufficiency    Orders:  •  Vitamin D 25 hydroxy    Encounter for lipid screening for cardiovascular disease    Orders:  •  Lipid Panel with Direct LDL reflex  •  Apolipoprotein B  •  Lipoprotein A (LPA)    Prostate cancer screening    Orders:  •  PSA, Total Screen; Future    Adhesive capsulitis of right shoulder    Orders:  •  Ambulatory referral to Orthopedic Surgery; Future  •  Ambulatory Referral to Physical Therapy; Future         HEALTH MAINTENANCE     Immunization History   Administered Date(s) Administered   • COVID-19 PFIZER VACCINE 0.3 ML IM 12/30/2021   • COVID-19 Pfizer vac (Todd-sucrose, gray cap) 12 yr+ IM 01/20/2022   • Pneumococcal Polysaccharide PPV23 03/06/2015     Colonoscopy: 09/03/2024 09/03/2024  Cologuard: Not on file   CT lung:    Prostate:   Lab Results   Component Value Date    PSA 0.701 06/17/2024           FOLLOW UP   No follow-ups on file.    CURRENT MEDICATIONS     Current Outpatient Medications:   •  atorvastatin (LIPITOR) 40 mg tablet, TAKE 1 TABLET BY MOUTH EVERY DAY, Disp: 90 tablet, Rfl: 1  •  B-D ULTRAFINE III SHORT PEN 31G X 8 MM MISC, INJECT UNDER THE SKIN 4 (FOUR) TIMES A DAY, Disp: 100 each, Rfl: 7  •  Continuous Glucose  (FreeStyle Demond 3 [place]) POLA, Use to monitor blood sugar continuously, Disp: 1 each, Rfl: 0  •  Continuous Glucose Sensor (FreeStyle Demond 3 Sensor) MISC, Use to check your blood sugar continuously and change every 2 weeks, Disp: 2 each, Rfl: 11  •  insulin lispro (HumaLOG) 100 units/mL injection pen, USE 1U/5GM CARB PLUS 1U/25MG/DL ABOVE 120MG/DL MAX 80U/DAY,  Disp: 30 mL, Rfl: 5  •  Lantus SoloStar 100 units/mL SOPN, INJECT 50 UNITS UNDER THE SKIN EVERY 12 HOURS, Disp: 90 mL, Rfl: 1  •  metFORMIN (GLUCOPHAGE-XR) 500 mg 24 hr tablet, Take 1 tablet (500 mg total) by mouth 2 (two) times a day with meals, Disp: 180 tablet, Rfl: 1  •  metoprolol succinate (TOPROL-XL) 25 mg 24 hr tablet, Take 1 tablet (25 mg total) by mouth daily, Disp: 90 tablet, Rfl: 1  •  Multiple Vitamins-Minerals (MULTIVITAMIN ADULT PO), Take by mouth, Disp: , Rfl:   •  OneTouch Verio test strip, USE 1 EACH 4 (FOUR) TIMES A DAY, Disp: 100 strip, Rfl: 9      CHIEF COMPLIANT     Chief Complaint   Patient presents with   • Follow-up     3 month follow up        HISTORY OF PRESENT ILLNESS    History of Present Illness     History obtained from : patient  HPI  Review of Systems    PAST MEDICAL & SURGICAL HISTORY     Past Medical History:   Diagnosis Date   • Diabetes mellitus type 1 (HCC)      Past Surgical History:   Procedure Laterality Date   • COLONOSCOPY     • NO PAST SURGERIES         OBJECTIVES      /70 (Patient Position: Sitting, Cuff Size: Large)   Pulse (!) 113   Temp (!) 94.8 °F (34.9 °C)   Ht 6' (1.829 m)   Wt 123 kg (271 lb 9.6 oz)   SpO2 97%   BMI 36.84 kg/m²    Physical Exam  Vitals reviewed.   Constitutional:       General: He is not in acute distress.     Appearance: Normal appearance. He is obese. He is not ill-appearing, toxic-appearing or diaphoretic.   HENT:      Head: Normocephalic and atraumatic.      Right Ear: External ear normal.      Left Ear: External ear normal.      Nose: No congestion or rhinorrhea.   Eyes:      General: No scleral icterus.        Right eye: No discharge.         Left eye: No discharge.      Conjunctiva/sclera: Conjunctivae normal.   Cardiovascular:      Rate and Rhythm: Normal rate.   Pulmonary:      Effort: Pulmonary effort is normal. No respiratory distress.   Neurological:      General: No focal deficit present.      Mental Status: He is alert and  oriented to person, place, and time.   Psychiatric:         Mood and Affect: Mood normal.         Behavior: Behavior normal.         Thought Content: Thought content normal.           Alexis Edge MD  Family Medicine Physician   [facility] [place]      Administrative Statements     Medications have been reviewed by provider in current encounter

## 2025-02-07 ENCOUNTER — APPOINTMENT (OUTPATIENT)
Dept: RADIOLOGY | Facility: CLINIC | Age: 45
End: 2025-02-07
Payer: COMMERCIAL

## 2025-02-07 ENCOUNTER — OFFICE VISIT (OUTPATIENT)
Dept: OBGYN CLINIC | Facility: CLINIC | Age: 45
End: 2025-02-07
Payer: COMMERCIAL

## 2025-02-07 VITALS — HEIGHT: 72 IN | WEIGHT: 275 LBS | BODY MASS INDEX: 37.25 KG/M2

## 2025-02-07 DIAGNOSIS — M62.838 TRAPEZIUS MUSCLE SPASM: ICD-10-CM

## 2025-02-07 DIAGNOSIS — M75.41 SUBACROMIAL IMPINGEMENT OF RIGHT SHOULDER: ICD-10-CM

## 2025-02-07 DIAGNOSIS — S46.011A ROTATOR CUFF STRAIN, RIGHT, INITIAL ENCOUNTER: ICD-10-CM

## 2025-02-07 DIAGNOSIS — M75.01 ADHESIVE CAPSULITIS OF RIGHT SHOULDER: ICD-10-CM

## 2025-02-07 DIAGNOSIS — M75.01 ADHESIVE CAPSULITIS OF RIGHT SHOULDER: Primary | ICD-10-CM

## 2025-02-07 PROCEDURE — 99214 OFFICE O/P EST MOD 30 MIN: CPT | Performed by: FAMILY MEDICINE

## 2025-02-07 PROCEDURE — 73030 X-RAY EXAM OF SHOULDER: CPT

## 2025-02-07 NOTE — LETTER
February 7, 2025     Alexis Edge MD  [address] [facility] [address]    Patient: Otto Altman   YOB: 1980   Date of Visit: 2/7/2025       Dear Dr. Edge:    Thank you for referring Otto Altman to me for evaluation. Below are my notes for this consultation.    If you have questions, please do not hesitate to call me. I look forward to following your patient along with you.         Sincerely,        Clifford Davis DO        CC: No Recipients    Clifford Davis DO  2/7/2025 12:48 PM  Sign when Signing Visit  Assessment/Plan:  Assessment & Plan  Diagnoses and all orders for this visit:    Adhesive capsulitis of right shoulder  -     Ambulatory referral to Orthopedic Surgery  -     XR shoulder 2+ vw right; Future  -     Ambulatory Referral to Physical Therapy; Future    Subacromial impingement of right shoulder  -     Ambulatory Referral to Physical Therapy; Future    Rotator cuff strain, right, initial encounter  -     Ambulatory Referral to Physical Therapy; Future    Trapezius muscle spasm  -     Ambulatory Referral to Physical Therapy; Future      45-year-old right-hand-dominant male with right shoulder pain many years duration.  Discussed with patient imaging studies, clinical exam, impression, and plan.  X-rays right shoulder noted for AC joint degenerative changes with suspected subacromial spur versus calcification.  Imaging studies, clinical exam, and history suggest that he may have adhesive capsulitis result of prolonged impingement.  I discussed treatment regimen of formal therapy.  Surgery not warranted at this time.  Also discussed with patient due to uncontrolled diabetes surgery and steroid injection contracted at this time.  He is to continue follow-up primary care physician to obtain optimal glucose control.  He is to start physical therapy as soon as possible and do home exercises as directed.  He will follow-up after  completing at least 1 month of formal therapy.        Subjective:   Patient ID: Otto Altman is a 45 y.o. male.  Chief Complaint   Patient presents with   • Right Shoulder - Pain        45-year-old right-hand-dominant male presents for evaluation of right shoulder pain many years duration.  He reports having been involved in a snowboarding accident in 2010 and since then having issues the shoulder.  He has pain described as generalized to the shoulder but worse to the anterior superior aspect, nonradiating, worse with moving the arm particular elevating and reaching behind his back, associated with limited range of motion, and improved with resting.  He does not usually take medication for symptoms.  He reports worsening of symptoms over the past few months.  He was seen by primary care physician and referred to orthopedic care.    Shoulder Pain  This is a chronic problem. The current episode started more than 1 year ago. The problem occurs daily. The problem has been gradually worsening. Associated symptoms include arthralgias and numbness. Pertinent negatives include no joint swelling or weakness. The symptoms are aggravated by twisting (Arm movement). He has tried rest and position changes for the symptoms. The treatment provided mild relief.           Review of Systems   Musculoskeletal:  Positive for arthralgias. Negative for joint swelling.   Neurological:  Positive for numbness. Negative for weakness.       Objective:  Vitals:    02/07/25 0903   Weight: 125 kg (275 lb)   Height: 6' (1.829 m)      Right Hand Exam     Muscle Strength   The patient has normal right wrist strength.    Other   Sensation: normal  Pulse: present      Left Hand Exam     Muscle Strength   The patient has normal left wrist strength.    Other   Sensation: normal  Pulse: present      Right Elbow Exam     Tenderness   The patient is experiencing no tenderness.     Range of Motion   The patient has normal right elbow ROM.    Muscle  Strength   The patient has normal right elbow strength (5/5 flexion and extension).    Other   Sensation: normal      Left Elbow Exam     Other   Sensation: normal      Right Shoulder Exam     Tenderness   Right shoulder tenderness location: Anterior, lateral.    Range of Motion   Active abduction:  80   Forward flexion:  90   Right shoulder internal rotation 0 degrees: Right buttock.     Muscle Strength   Abduction: 5/5   Internal rotation: 5/5   External rotation: 5/5   Supraspinatus: 5/5     Tests   Johnson test: positive    Other   Sensation: normal      Left Shoulder Exam     Other   Sensation: normal           Strength/Myotome Testing     Left Wrist/Hand   Normal wrist strength    Right Wrist/Hand   Normal wrist strength      Physical Exam  Vitals and nursing note reviewed.   Constitutional:       Appearance: Normal appearance. He is well-developed. He is not ill-appearing or diaphoretic.   HENT:      Head: Normocephalic and atraumatic.      Right Ear: External ear normal.      Left Ear: External ear normal.   Eyes:      Conjunctiva/sclera: Conjunctivae normal.   Neck:      Trachea: No tracheal deviation.   Pulmonary:      Effort: Pulmonary effort is normal. No respiratory distress.   Abdominal:      General: There is no distension.   Musculoskeletal:         General: Tenderness present.   Skin:     General: Skin is warm and dry.      Coloration: Skin is not jaundiced or pale.   Neurological:      Mental Status: He is alert and oriented to person, place, and time.   Psychiatric:         Mood and Affect: Mood normal.         Behavior: Behavior normal.         Thought Content: Thought content normal.         Judgment: Judgment normal.         I have personally reviewed pertinent films in PACS and my interpretation is  .  X-rays right shoulder noted for AC joint degenerative changes with suspected subacromial spur versus calcification.

## 2025-02-07 NOTE — PROGRESS NOTES
Assessment/Plan:  Assessment & Plan   Diagnoses and all orders for this visit:    Adhesive capsulitis of right shoulder  -     Ambulatory referral to Orthopedic Surgery  -     XR shoulder 2+ vw right; Future  -     Ambulatory Referral to Physical Therapy; Future    Subacromial impingement of right shoulder  -     Ambulatory Referral to Physical Therapy; Future    Rotator cuff strain, right, initial encounter  -     Ambulatory Referral to Physical Therapy; Future    Trapezius muscle spasm  -     Ambulatory Referral to Physical Therapy; Future      45-year-old right-hand-dominant male with right shoulder pain many years duration.  Discussed with patient imaging studies, clinical exam, impression, and plan.  X-rays right shoulder noted for AC joint degenerative changes with suspected subacromial spur versus calcification.  Imaging studies, clinical exam, and history suggest that he may have adhesive capsulitis result of prolonged impingement.  I discussed treatment regimen of formal therapy.  Surgery not warranted at this time.  Also discussed with patient due to uncontrolled diabetes surgery and steroid injection contracted at this time.  He is to continue follow-up primary care physician to obtain optimal glucose control.  He is to start physical therapy as soon as possible and do home exercises as directed.  He will follow-up after completing at least 1 month of formal therapy.        Subjective:   Patient ID: Otto Altman is a 45 y.o. male.  Chief Complaint   Patient presents with    Right Shoulder - Pain        45-year-old right-hand-dominant male presents for evaluation of right shoulder pain many years duration.  He reports having been involved in a snowboarding accident in 2010 and since then having issues the shoulder.  He has pain described as generalized to the shoulder but worse to the anterior superior aspect, nonradiating, worse with moving the arm particular elevating and reaching behind his back,  associated with limited range of motion, and improved with resting.  He does not usually take medication for symptoms.  He reports worsening of symptoms over the past few months.  He was seen by primary care physician and referred to orthopedic care.    Shoulder Pain  This is a chronic problem. The current episode started more than 1 year ago. The problem occurs daily. The problem has been gradually worsening. Associated symptoms include arthralgias and numbness. Pertinent negatives include no joint swelling or weakness. The symptoms are aggravated by twisting (Arm movement). He has tried rest and position changes for the symptoms. The treatment provided mild relief.           Review of Systems   Musculoskeletal:  Positive for arthralgias. Negative for joint swelling.   Neurological:  Positive for numbness. Negative for weakness.       Objective:  Vitals:    02/07/25 0903   Weight: 125 kg (275 lb)   Height: 6' (1.829 m)      Right Hand Exam     Muscle Strength   The patient has normal right wrist strength.    Other   Sensation: normal  Pulse: present      Left Hand Exam     Muscle Strength   The patient has normal left wrist strength.    Other   Sensation: normal  Pulse: present      Right Elbow Exam     Tenderness   The patient is experiencing no tenderness.     Range of Motion   The patient has normal right elbow ROM.    Muscle Strength   The patient has normal right elbow strength (5/5 flexion and extension).    Other   Sensation: normal      Left Elbow Exam     Other   Sensation: normal      Right Shoulder Exam     Tenderness   Right shoulder tenderness location: Anterior, lateral.    Range of Motion   Active abduction:  80   Forward flexion:  90   Right shoulder internal rotation 0 degrees: Right buttock.     Muscle Strength   Abduction: 5/5   Internal rotation: 5/5   External rotation: 5/5   Supraspinatus: 5/5     Tests   Johnson test: positive    Other   Sensation: normal      Left Shoulder Exam     Other    Sensation: normal           Strength/Myotome Testing     Left Wrist/Hand   Normal wrist strength    Right Wrist/Hand   Normal wrist strength      Physical Exam  Vitals and nursing note reviewed.   Constitutional:       Appearance: Normal appearance. He is well-developed. He is not ill-appearing or diaphoretic.   HENT:      Head: Normocephalic and atraumatic.      Right Ear: External ear normal.      Left Ear: External ear normal.   Eyes:      Conjunctiva/sclera: Conjunctivae normal.   Neck:      Trachea: No tracheal deviation.   Pulmonary:      Effort: Pulmonary effort is normal. No respiratory distress.   Abdominal:      General: There is no distension.   Musculoskeletal:         General: Tenderness present.   Skin:     General: Skin is warm and dry.      Coloration: Skin is not jaundiced or pale.   Neurological:      Mental Status: He is alert and oriented to person, place, and time.   Psychiatric:         Mood and Affect: Mood normal.         Behavior: Behavior normal.         Thought Content: Thought content normal.         Judgment: Judgment normal.         I have personally reviewed pertinent films in PACS and my interpretation is  .  X-rays right shoulder noted for AC joint degenerative changes with suspected subacromial spur versus calcification.    More than 40 minutes spent reviewing patient chart, reviewing and interpreting imaging studies, obtaining history from patient, examining patient, discussing and implementing treatment plan coordinating care and placing orders.

## 2025-02-07 NOTE — PATIENT INSTRUCTIONS
Over the counter diclofenac gel/voltaren  - 3 times daily for 10 days    [employer]    Physical Therapy and home exercises

## 2025-03-12 ENCOUNTER — TELEPHONE (OUTPATIENT)
Age: 45
End: 2025-03-12

## 2025-04-01 DIAGNOSIS — I10 ESSENTIAL HYPERTENSION: ICD-10-CM

## 2025-04-02 RX ORDER — METOPROLOL SUCCINATE 25 MG/1
25 TABLET, EXTENDED RELEASE ORAL DAILY
Qty: 90 TABLET | Refills: 1 | Status: SHIPPED | OUTPATIENT
Start: 2025-04-02

## 2025-04-23 DIAGNOSIS — E78.2 MIXED HYPERLIPIDEMIA: ICD-10-CM

## 2025-04-23 DIAGNOSIS — E88.819 INSULIN RESISTANCE: ICD-10-CM

## 2025-04-23 RX ORDER — METFORMIN HYDROCHLORIDE 500 MG/1
500 TABLET, EXTENDED RELEASE ORAL 2 TIMES DAILY WITH MEALS
Qty: 180 TABLET | Refills: 1 | Status: SHIPPED | OUTPATIENT
Start: 2025-04-23

## 2025-04-23 RX ORDER — ATORVASTATIN CALCIUM 40 MG/1
40 TABLET, FILM COATED ORAL DAILY
Qty: 90 TABLET | Refills: 1 | Status: SHIPPED | OUTPATIENT
Start: 2025-04-23

## 2025-05-16 DIAGNOSIS — E10.9 TYPE 1 DIABETES MELLITUS WITHOUT COMPLICATION (HCC): ICD-10-CM

## 2025-05-16 RX ORDER — PEN NEEDLE, DIABETIC 31 GX5/16"
NEEDLE, DISPOSABLE MISCELLANEOUS 4 TIMES DAILY
Qty: 100 EACH | Refills: 5 | Status: SHIPPED | OUTPATIENT
Start: 2025-05-16

## 2025-05-30 DIAGNOSIS — E10.65 TYPE 1 DIABETES MELLITUS WITH HYPERGLYCEMIA (HCC): ICD-10-CM

## 2025-05-30 RX ORDER — INSULIN GLARGINE 100 [IU]/ML
INJECTION, SOLUTION SUBCUTANEOUS
Qty: 90 ML | Refills: 1 | Status: SHIPPED | OUTPATIENT
Start: 2025-05-30

## 2025-07-02 ENCOUNTER — APPOINTMENT (OUTPATIENT)
Age: 45
End: 2025-07-02
Payer: COMMERCIAL

## 2025-07-02 DIAGNOSIS — E78.2 MIXED HYPERLIPIDEMIA: ICD-10-CM

## 2025-07-02 DIAGNOSIS — Z12.5 PROSTATE CANCER SCREENING: ICD-10-CM

## 2025-07-02 DIAGNOSIS — E10.65 TYPE 1 DIABETES MELLITUS WITH HYPERGLYCEMIA (HCC): ICD-10-CM

## 2025-07-02 LAB
25(OH)D3 SERPL-MCNC: 71.8 NG/ML (ref 30–100)
ALBUMIN SERPL BCG-MCNC: 4.2 G/DL (ref 3.5–5)
ALP SERPL-CCNC: 74 U/L (ref 34–104)
ALT SERPL W P-5'-P-CCNC: 24 U/L (ref 7–52)
ANION GAP SERPL CALCULATED.3IONS-SCNC: 10 MMOL/L (ref 4–13)
AST SERPL W P-5'-P-CCNC: 18 U/L (ref 13–39)
BASOPHILS # BLD AUTO: 0.02 THOUSANDS/ÂΜL (ref 0–0.1)
BASOPHILS NFR BLD AUTO: 0 % (ref 0–1)
BILIRUB SERPL-MCNC: 0.76 MG/DL (ref 0.2–1)
BUN SERPL-MCNC: 11 MG/DL (ref 5–25)
CALCIUM SERPL-MCNC: 9.1 MG/DL (ref 8.4–10.2)
CHLORIDE SERPL-SCNC: 104 MMOL/L (ref 96–108)
CHOLEST SERPL-MCNC: 149 MG/DL (ref ?–200)
CO2 SERPL-SCNC: 29 MMOL/L (ref 21–32)
CREAT SERPL-MCNC: 0.94 MG/DL (ref 0.6–1.3)
CREAT UR-MCNC: 288.3 MG/DL
EOSINOPHIL # BLD AUTO: 0.08 THOUSAND/ÂΜL (ref 0–0.61)
EOSINOPHIL NFR BLD AUTO: 2 % (ref 0–6)
ERYTHROCYTE [DISTWIDTH] IN BLOOD BY AUTOMATED COUNT: 13.3 % (ref 11.6–15.1)
EST. AVERAGE GLUCOSE BLD GHB EST-MCNC: 217 MG/DL
GFR SERPL CREATININE-BSD FRML MDRD: 96 ML/MIN/1.73SQ M
GLUCOSE P FAST SERPL-MCNC: 157 MG/DL (ref 65–99)
HBA1C MFR BLD: 9.2 %
HCT VFR BLD AUTO: 42.6 % (ref 36.5–49.3)
HDLC SERPL-MCNC: 40 MG/DL
HGB BLD-MCNC: 14.6 G/DL (ref 12–17)
IMM GRANULOCYTES # BLD AUTO: 0.02 THOUSAND/UL (ref 0–0.2)
IMM GRANULOCYTES NFR BLD AUTO: 0 % (ref 0–2)
LDLC SERPL CALC-MCNC: 81 MG/DL (ref 0–100)
LYMPHOCYTES # BLD AUTO: 1.94 THOUSANDS/ÂΜL (ref 0.6–4.47)
LYMPHOCYTES NFR BLD AUTO: 38 % (ref 14–44)
MCH RBC QN AUTO: 28.7 PG (ref 26.8–34.3)
MCHC RBC AUTO-ENTMCNC: 34.3 G/DL (ref 31.4–37.4)
MCV RBC AUTO: 84 FL (ref 82–98)
MICROALBUMIN UR-MCNC: 26.3 MG/L
MICROALBUMIN/CREAT 24H UR: 9 MG/G CREATININE (ref 0–30)
MONOCYTES # BLD AUTO: 0.34 THOUSAND/ÂΜL (ref 0.17–1.22)
MONOCYTES NFR BLD AUTO: 7 % (ref 4–12)
NEUTROPHILS # BLD AUTO: 2.76 THOUSANDS/ÂΜL (ref 1.85–7.62)
NEUTS SEG NFR BLD AUTO: 53 % (ref 43–75)
NRBC BLD AUTO-RTO: 0 /100 WBCS
PLATELET # BLD AUTO: 179 THOUSANDS/UL (ref 149–390)
PMV BLD AUTO: 11.7 FL (ref 8.9–12.7)
POTASSIUM SERPL-SCNC: 3.8 MMOL/L (ref 3.5–5.3)
PROT SERPL-MCNC: 7.3 G/DL (ref 6.4–8.4)
PSA SERPL-MCNC: 1.07 NG/ML (ref 0–4)
RBC # BLD AUTO: 5.08 MILLION/UL (ref 3.88–5.62)
SODIUM SERPL-SCNC: 143 MMOL/L (ref 135–147)
TRIGL SERPL-MCNC: 140 MG/DL (ref ?–150)
TSH SERPL DL<=0.05 MIU/L-ACNC: 1.22 UIU/ML (ref 0.45–4.5)
WBC # BLD AUTO: 5.16 THOUSAND/UL (ref 4.31–10.16)

## 2025-07-02 PROCEDURE — 82570 ASSAY OF URINE CREATININE: CPT

## 2025-07-02 PROCEDURE — 36415 COLL VENOUS BLD VENIPUNCTURE: CPT

## 2025-07-02 PROCEDURE — 82043 UR ALBUMIN QUANTITATIVE: CPT

## 2025-07-02 PROCEDURE — G0103 PSA SCREENING: HCPCS

## 2025-07-03 ENCOUNTER — OFFICE VISIT (OUTPATIENT)
Age: 45
End: 2025-07-03
Payer: COMMERCIAL

## 2025-07-03 VITALS
BODY MASS INDEX: 39.45 KG/M2 | WEIGHT: 281.8 LBS | HEART RATE: 94 BPM | OXYGEN SATURATION: 95 % | SYSTOLIC BLOOD PRESSURE: 132 MMHG | TEMPERATURE: 97.6 F | DIASTOLIC BLOOD PRESSURE: 84 MMHG | RESPIRATION RATE: 16 BRPM | HEIGHT: 71 IN

## 2025-07-03 DIAGNOSIS — Z00.00 HEALTHCARE MAINTENANCE: ICD-10-CM

## 2025-07-03 DIAGNOSIS — L85.3 DRY SKIN: ICD-10-CM

## 2025-07-03 DIAGNOSIS — E78.2 MIXED HYPERLIPIDEMIA: ICD-10-CM

## 2025-07-03 DIAGNOSIS — I10 ESSENTIAL HYPERTENSION: ICD-10-CM

## 2025-07-03 DIAGNOSIS — E10.65 TYPE 1 DIABETES MELLITUS WITH HYPERGLYCEMIA (HCC): Primary | ICD-10-CM

## 2025-07-03 PROCEDURE — 99214 OFFICE O/P EST MOD 30 MIN: CPT

## 2025-07-03 PROCEDURE — 99396 PREV VISIT EST AGE 40-64: CPT

## 2025-07-03 NOTE — PROGRESS NOTES
Adult Annual Physical  Name: Otto Altman      : 1980      MRN: [id number]  Encounter Provider: Maurilio Larkin PA-C  Encounter Date: 7/3/2025   Encounter department: [facility] [place]    :  Assessment & Plan  Type 1 diabetes mellitus with hyperglycemia (HCC)    Lab Results   Component Value Date    HGBA1C 9.2 (H) 2025   Uncontrolled, Last seen by endocrinology 10/2024   Recommend following up for management as he has been uncontrolled for awhile, provided scheduling number   Continue current medications with lifestyle modifications     Recent labs from yesterday reviewed,  UACR 9, GFR 96   LDL significantly improved from 160 to 81. Continue lipitor 40      Update labs in 3 months, follow up in 4 months   Foot and eye exam, deferred to next appointment   Orders:    Hemoglobin A1C; Future    Comprehensive metabolic panel; Future    CBC and Platelet; Future    Lipid Panel with Direct LDL reflex; Future    Mixed hyperlipidemia  Uncontrolled, LDL goal less than 70 with diabetes but LDL significantly improved from 160 to 81. Continue lipitor 40 with lifestyle modifications   Orders:    Lipid Panel with Direct LDL reflex; Future    Essential hypertension  Borderline today at 132/84, continue current medications with lifestyle modifications   Orders:    Comprehensive metabolic panel; Future    CBC and Platelet; Future    Dry skin  Feels like its been there his whole life on and off. No itching at any point. Discussed with patient suspicion for xeroderma vs eczema, recommend moisturizer with hyaluronic acid, discussed reputable brands. Follow up for any new or worsening/persistent symptoms       Healthcare maintenance             Preventive Screenings:  - Diabetes Screening: screening not indicated and has diabetes  - Cholesterol Screening: screening not indicated and has hyperlipidemia   - Hepatitis C screening: screening up-to-date   - HIV screening: screening up-to-date   -  Colon cancer screening: screening up-to-date   - Lung cancer screening: screening not indicated   - Prostate cancer screening: screening up-to-date     Immunizations:  - Immunizations due: Prevnar 20 and Tdap      Depression Screening and Follow-up Plan: Patient was screened for depression during today's encounter. They screened negative with a PHQ-2 score of 0.          History of Present Illness     Adult Annual Physical:  Patient presents for annual physical.     Depression Screening:  - PHQ-2 Score: 0    Review of Systems   Constitutional:  Negative for activity change, diaphoresis, fatigue and fever.   HENT: Negative.     Eyes: Negative.    Respiratory: Negative.  Negative for cough, chest tightness and shortness of breath.    Cardiovascular:  Negative for chest pain, palpitations and leg swelling.   Gastrointestinal: Negative.    Endocrine: Negative.  Negative for polydipsia, polyphagia and polyuria.   Genitourinary: Negative.  Negative for dysuria, flank pain, frequency, hematuria and urgency.   Musculoskeletal: Negative.  Negative for back pain, joint swelling, neck pain and neck stiffness.   Skin:  Positive for rash. Negative for color change, pallor and wound.   Neurological: Negative.  Negative for dizziness, weakness, light-headedness and headaches.   Hematological:  Negative for adenopathy.   Psychiatric/Behavioral: Negative.  Negative for confusion and sleep disturbance. The patient is not nervous/anxious.      Medical History Reviewed by provider this encounter:     .  Past Medical History   Past Medical History[1]  Past Surgical History[2]  Family History[3]   reports that he has never smoked. He has never used smokeless tobacco. He reports current alcohol use. He reports that he does not use drugs.  Current Outpatient Medications   Medication Instructions    atorvastatin (LIPITOR) 40 mg, Oral, Daily    Continuous Glucose  (FreeStyle Demond 3 [place]) POLA Use to monitor blood sugar  "continuously    Continuous Glucose Sensor (FreeStyle Demond 3 Sensor) MISC Use to check your blood sugar continuously and change every 2 weeks    insulin lispro (HumaLOG) 100 units/mL injection pen USE 1U/5GM CARB PLUS 1U/25MG/DL ABOVE 120MG/DL MAX 80U/DAY    Insulin Pen Needle (B-D ULTRAFINE III SHORT PEN) 31G X 8 MM MISC Subcutaneous, 4 times daily    Lantus SoloStar 100 units/mL SOPN INJECT 50 UNITS UNDER THE SKIN EVERY 12 HOURS    metFORMIN (GLUCOPHAGE-XR) 500 mg, Oral, 2 times daily with meals    metoprolol succinate (TOPROL-XL) 25 mg, Oral, Daily    Multiple Vitamins-Minerals (MULTIVITAMIN ADULT PO) Take by mouth    OneTouch Verio test strip USE 1 EACH 4 (FOUR) TIMES A DAY   Allergies[4]   Medications Ordered Prior to Encounter[5]   Social History[6]    Objective   /84 (BP Location: Left arm, Patient Position: Sitting, Cuff Size: Standard)   Pulse 101   Temp 97.6 °F (36.4 °C) (Tympanic)   Resp 16   Ht 5' 10.5\" (1.791 m) Comment: i measured him  Wt 128 kg (281 lb 12.8 oz)   SpO2 95%   BMI 39.86 kg/m²     Physical Exam  Vitals and nursing note reviewed.   Constitutional:       General: He is not in acute distress.     Appearance: He is normal weight. He is not ill-appearing, toxic-appearing or diaphoretic.   HENT:      Head: Normocephalic and atraumatic.      Right Ear: External ear normal.      Left Ear: External ear normal.      Nose: Nose normal.     Eyes:      General: No scleral icterus.        Right eye: No discharge.         Left eye: No discharge.      Extraocular Movements: Extraocular movements intact.      Conjunctiva/sclera: Conjunctivae normal.     Neck:      Vascular: No carotid bruit.     Cardiovascular:      Rate and Rhythm: Normal rate and regular rhythm.      Heart sounds: No murmur heard.  Pulmonary:      Effort: Pulmonary effort is normal. No respiratory distress.      Breath sounds: Normal breath sounds. No wheezing or rales.     Musculoskeletal:      Cervical back: Normal range " of motion and neck supple.      Right lower leg: No edema.      Left lower leg: No edema.     Skin:     Findings: Lesion (flat slightly hyperpigmented patches with overlying scales on the right shoulder, chest, back. No itching, no nail pitting) present. No rash.     Neurological:      Mental Status: He is alert. Mental status is at baseline.     Psychiatric:         Mood and Affect: Mood normal.         Behavior: Behavior normal.         Thought Content: Thought content normal.         Judgment: Judgment normal.                [1]   Past Medical History:  Diagnosis Date    Diabetes mellitus (HCC)     Type I    Diabetes mellitus type 1 (HCC)     Obesity     According to the chart on the wall   [2]   Past Surgical History:  Procedure Laterality Date    COLONOSCOPY      NO PAST SURGERIES     [3]   Family History  Problem Relation Name Age of Onset    Diabetes Mother Elizabeth Forbes     Diabetes type II Mother Elizabeth Forbes     Diabetes Father Mike Altman     Diabetes type II Father Mike Altman    [4] No Known Allergies  [5]   Current Outpatient Medications on File Prior to Visit   Medication Sig Dispense Refill    atorvastatin (LIPITOR) 40 mg tablet TAKE 1 TABLET BY MOUTH EVERY DAY 90 tablet 1    Continuous Glucose  (FreeStyle Demond 3 [place]) POLA Use to monitor blood sugar continuously 1 each 0    Continuous Glucose Sensor (FreeStyle Demond 3 Sensor) MISC Use to check your blood sugar continuously and change every 2 weeks 2 each 11    insulin lispro (HumaLOG) 100 units/mL injection pen USE 1U/5GM CARB PLUS 1U/25MG/DL ABOVE 120MG/DL MAX 80U/DAY 30 mL 5    Insulin Pen Needle (B-D ULTRAFINE III SHORT PEN) 31G X 8 MM MISC INJECT UNDER THE SKIN 4 (FOUR) TIMES A  each 5    Lantus SoloStar 100 units/mL SOPN INJECT 50 UNITS UNDER THE SKIN EVERY 12 HOURS 90 mL 1    metFORMIN (GLUCOPHAGE-XR) 500 mg 24 hr tablet TAKE 1 TABLET BY MOUTH TWICE A DAY WITH MEALS 180 tablet 1    metoprolol succinate (TOPROL-XL) 25 mg 24  hr tablet TAKE 1 TABLET (25 MG TOTAL) BY MOUTH DAILY. 90 tablet 1    Multiple Vitamins-Minerals (MULTIVITAMIN ADULT PO) Take by mouth      OneTouch Verio test strip USE 1 EACH 4 (FOUR) TIMES A  strip 9     No current facility-administered medications on file prior to visit.   [6]   Social History  Tobacco Use    Smoking status: Never    Smokeless tobacco: Never   Vaping Use    Vaping status: Never Used   Substance and Sexual Activity    Alcohol use: Yes     Comment: on occasion    Drug use: Never    Sexual activity: Yes     Partners: Female

## 2025-07-03 NOTE — ASSESSMENT & PLAN NOTE
Uncontrolled, LDL goal less than 70 with diabetes but LDL significantly improved from 160 to 81. Continue lipitor 40 with lifestyle modifications   Orders:    Lipid Panel with Direct LDL reflex; Future

## 2025-07-03 NOTE — ASSESSMENT & PLAN NOTE
Borderline today at 132/84, continue current medications with lifestyle modifications   Orders:    Comprehensive metabolic panel; Future    CBC and Platelet; Future

## 2025-07-03 NOTE — ASSESSMENT & PLAN NOTE
Lab Results   Component Value Date    HGBA1C 9.2 (H) 07/02/2025   Uncontrolled, Last seen by endocrinology 10/2024   Recommend following up for management as he has been uncontrolled for awhile, provided scheduling number   Continue current medications with lifestyle modifications     Recent labs from yesterday reviewed,  UACR 9, GFR 96   LDL significantly improved from 160 to 81. Continue lipitor 40      Update labs in 3 months, follow up in 4 months   Foot and eye exam, deferred to next appointment   Orders:    Hemoglobin A1C; Future    Comprehensive metabolic panel; Future    CBC and Platelet; Future    Lipid Panel with Direct LDL reflex; Future

## 2025-07-04 LAB — LPA SERPL-SCNC: 293.7 NMOL/L

## 2025-07-06 LAB — APO B SERPL-MCNC: 91 MG/DL

## 2025-07-07 ENCOUNTER — TELEPHONE (OUTPATIENT)
Dept: ADMINISTRATIVE | Facility: OTHER | Age: 45
End: 2025-07-07

## 2025-07-07 NOTE — TELEPHONE ENCOUNTER
----- Message from Riana PEREIRA sent at 7/3/2025  9:06 AM EDT -----  Regarding: diabetic foot exam  07/03/25 9:06 AM    Hello, our patient Otto Altman has had Diabetic Foot Exam completed/performed. Please assist in updating the patient chart by making an External outreach to [facility] facility located in [place], [place]. The date of service is 02/21/2025.    Thank you,  Riana Mcgee MA  [facility] [place]

## 2025-07-07 NOTE — LETTER
Diabetic Foot Exam Form    Date Requested: 25  Patient: Otto Altman  Patient : 1980   Referring Provider: Alexis Edge MD    Diabetic Foot Exam Performed with shoes and socks removed        Yes         No     Date of Diabetic Foot Exam ______________________________  Risk Score ____________________________________________    Left Foot       Visual Inspection         Monofilament Testing Sensory Exam        Pedal Pulses         Additional Comments         Right Foot      Visual Inspection         Monofilament Testing Sensory Exam       Pedal Pulses         Additional Comments         Comments __________________________________________________________    Practice Providing Exam ______________________________________________    Exam Performed By (print name) _______________________________________      Provider Signature ___________________________________________________      These reports are needed for  compliance.    Please fax this completed form and a copy of the Diabetic Foot Exam report to the [facility] as soon as possible via Fax [phone], milagros Banks: Phone [phone]. Our office is located at [address] [place] [address].    We thank you for your assistance in treating our mutual patient.

## 2025-07-07 NOTE — LETTER
Diabetic Foot Exam Form     Date Requested: 25  Patient: Otto Altman  Patient : 1980   Referring Provider: Alexis Edge MD    Diabetic Foot Exam Performed with shoes and socks removed        Yes         No     Date of Diabetic Foot Exam ______________________________  Risk Score ____________________________________________    Left Foot       Visual Inspection         Monofilament Testing Sensory Exam        Pedal Pulses         Additional Comments         Right Foot      Visual Inspection         Monofilament Testing Sensory Exam       Pedal Pulses         Additional Comments         Comments __________________________________________________________    Practice Providing Exam ______________________________________________    Exam Performed By (print name) _______________________________________      Provider Signature ___________________________________________________      These reports are needed for  compliance.    Please fax this completed form and a copy of the Diabetic Foot Exam report to the [facility] as soon as possible via Fax [phone], milagros Banks: Phone [phone]. Our office is located at [address] [place] [address].    We thank you for your assistance in treating our mutual patient.

## 2025-07-08 NOTE — TELEPHONE ENCOUNTER
Upon review of the In Basket request and the patient's chart, initial outreach has been made via fax to facility. Please see Contacts section for details.     Thank you  Jocelyn Cano MA

## 2025-08-03 DIAGNOSIS — E10.65 TYPE 1 DIABETES MELLITUS WITH HYPERGLYCEMIA (HCC): ICD-10-CM

## 2025-08-04 RX ORDER — HYDROCHLOROTHIAZIDE 12.5 MG/1
CAPSULE ORAL
Qty: 2 EACH | Refills: 5 | Status: SHIPPED | OUTPATIENT
Start: 2025-08-04

## 2025-08-21 ENCOUNTER — OFFICE VISIT (OUTPATIENT)
Age: 45
End: 2025-08-21
Payer: COMMERCIAL

## 2025-08-21 ENCOUNTER — TELEPHONE (OUTPATIENT)
Age: 45
End: 2025-08-21

## 2025-08-21 VITALS
DIASTOLIC BLOOD PRESSURE: 76 MMHG | BODY MASS INDEX: 39.62 KG/M2 | OXYGEN SATURATION: 94 % | TEMPERATURE: 97.7 F | HEART RATE: 110 BPM | HEIGHT: 71 IN | WEIGHT: 283 LBS | SYSTOLIC BLOOD PRESSURE: 132 MMHG

## 2025-08-21 DIAGNOSIS — E10.65 TYPE 1 DIABETES MELLITUS WITH HYPERGLYCEMIA (HCC): Primary | ICD-10-CM

## 2025-08-21 DIAGNOSIS — E66.813 CLASS 3 SEVERE OBESITY DUE TO EXCESS CALORIES WITH SERIOUS COMORBIDITY AND BODY MASS INDEX (BMI) OF 40.0 TO 44.9 IN ADULT: ICD-10-CM

## 2025-08-21 DIAGNOSIS — I10 ESSENTIAL HYPERTENSION: ICD-10-CM

## 2025-08-21 DIAGNOSIS — E78.2 MIXED HYPERLIPIDEMIA: ICD-10-CM

## 2025-08-21 DIAGNOSIS — E88.819 INSULIN RESISTANCE: ICD-10-CM

## 2025-08-21 PROCEDURE — 99214 OFFICE O/P EST MOD 30 MIN: CPT

## 2025-08-21 PROCEDURE — 95251 CONT GLUC MNTR ANALYSIS I&R: CPT

## 2025-08-21 RX ORDER — INSULIN LISPRO 100 [IU]/ML
INJECTION, SOLUTION INTRAVENOUS; SUBCUTANEOUS
Qty: 45 ML | Refills: 1 | Status: SHIPPED | OUTPATIENT
Start: 2025-08-21

## 2025-08-21 RX ORDER — METFORMIN HYDROCHLORIDE 500 MG/1
1000 TABLET, EXTENDED RELEASE ORAL 2 TIMES DAILY WITH MEALS
Qty: 360 TABLET | Refills: 1 | Status: SHIPPED | OUTPATIENT
Start: 2025-08-21

## 2025-08-21 RX ORDER — INSULIN GLARGINE 300 U/ML
50 INJECTION, SOLUTION SUBCUTANEOUS EVERY 12 HOURS SCHEDULED
Qty: 36 ML | Refills: 1 | Status: SHIPPED | OUTPATIENT
Start: 2025-08-21